# Patient Record
Sex: FEMALE | Race: WHITE | NOT HISPANIC OR LATINO | Employment: OTHER | URBAN - METROPOLITAN AREA
[De-identification: names, ages, dates, MRNs, and addresses within clinical notes are randomized per-mention and may not be internally consistent; named-entity substitution may affect disease eponyms.]

---

## 2017-10-16 ENCOUNTER — CONVERSION ENCOUNTER (OUTPATIENT)
Dept: MAMMOGRAPHY | Facility: HOSPITAL | Age: 57
End: 2017-10-16

## 2018-02-19 ENCOUNTER — OFFICE VISIT CONVERTED (OUTPATIENT)
Dept: NEUROLOGY | Facility: CLINIC | Age: 58
End: 2018-02-19
Attending: PSYCHIATRY & NEUROLOGY

## 2018-02-19 ENCOUNTER — CONVERSION ENCOUNTER (OUTPATIENT)
Dept: NEUROLOGY | Facility: CLINIC | Age: 58
End: 2018-02-19

## 2018-02-27 ENCOUNTER — OFFICE VISIT CONVERTED (OUTPATIENT)
Dept: CARDIOLOGY | Facility: CLINIC | Age: 58
End: 2018-02-27
Attending: SPECIALIST

## 2018-03-06 ENCOUNTER — OFFICE VISIT CONVERTED (OUTPATIENT)
Dept: NEUROLOGY | Facility: CLINIC | Age: 58
End: 2018-03-06
Attending: PSYCHIATRY & NEUROLOGY

## 2018-04-24 ENCOUNTER — OFFICE VISIT CONVERTED (OUTPATIENT)
Dept: NEUROLOGY | Facility: CLINIC | Age: 58
End: 2018-04-24
Attending: PSYCHIATRY & NEUROLOGY

## 2018-05-02 ENCOUNTER — OFFICE VISIT CONVERTED (OUTPATIENT)
Dept: FAMILY MEDICINE CLINIC | Facility: CLINIC | Age: 58
End: 2018-05-02
Attending: NURSE PRACTITIONER

## 2018-05-02 ENCOUNTER — CONVERSION ENCOUNTER (OUTPATIENT)
Dept: FAMILY MEDICINE CLINIC | Facility: CLINIC | Age: 58
End: 2018-05-02

## 2018-08-28 ENCOUNTER — OFFICE VISIT CONVERTED (OUTPATIENT)
Dept: CARDIOLOGY | Facility: CLINIC | Age: 58
End: 2018-08-28
Attending: SPECIALIST

## 2018-09-10 ENCOUNTER — OFFICE VISIT CONVERTED (OUTPATIENT)
Dept: FAMILY MEDICINE CLINIC | Facility: CLINIC | Age: 58
End: 2018-09-10
Attending: NURSE PRACTITIONER

## 2018-10-04 ENCOUNTER — PROCEDURE VISIT CONVERTED (OUTPATIENT)
Dept: PLASTIC SURGERY | Facility: CLINIC | Age: 58
End: 2018-10-04
Attending: PLASTIC SURGERY

## 2018-12-06 ENCOUNTER — PROCEDURE VISIT CONVERTED (OUTPATIENT)
Dept: PLASTIC SURGERY | Facility: CLINIC | Age: 58
End: 2018-12-06
Attending: PLASTIC SURGERY

## 2019-06-04 ENCOUNTER — OFFICE VISIT CONVERTED (OUTPATIENT)
Dept: FAMILY MEDICINE CLINIC | Facility: CLINIC | Age: 59
End: 2019-06-04
Attending: NURSE PRACTITIONER

## 2019-06-04 ENCOUNTER — CONVERSION ENCOUNTER (OUTPATIENT)
Dept: FAMILY MEDICINE CLINIC | Facility: CLINIC | Age: 59
End: 2019-06-04

## 2019-12-09 ENCOUNTER — CONVERSION ENCOUNTER (OUTPATIENT)
Dept: FAMILY MEDICINE CLINIC | Facility: CLINIC | Age: 59
End: 2019-12-09

## 2019-12-09 ENCOUNTER — OFFICE VISIT CONVERTED (OUTPATIENT)
Dept: FAMILY MEDICINE CLINIC | Facility: CLINIC | Age: 59
End: 2019-12-09
Attending: NURSE PRACTITIONER

## 2019-12-09 ENCOUNTER — HOSPITAL ENCOUNTER (OUTPATIENT)
Dept: LAB | Facility: HOSPITAL | Age: 59
Discharge: HOME | End: 2019-12-09
Attending: NURSE PRACTITIONER

## 2019-12-09 ENCOUNTER — HOSPITAL ENCOUNTER (OUTPATIENT)
Dept: GENERAL RADIOLOGY | Facility: HOSPITAL | Age: 59
Discharge: HOME | End: 2019-12-09
Attending: NURSE PRACTITIONER

## 2019-12-09 LAB
ANION GAP SERPL CALC-SCNC: 18 MMOL/L (ref 8–19)
BUN SERPL-MCNC: 16 MG/DL (ref 5–25)
BUN/CREAT SERPL: 29 {RATIO} (ref 6–20)
CALCIUM SERPL-MCNC: 9.9 MG/DL (ref 8.7–10.4)
CHLORIDE SERPL-SCNC: 105 MMOL/L (ref 99–111)
CONV CO2: 23 MMOL/L (ref 22–32)
CREAT UR-MCNC: 0.55 MG/DL (ref 0.5–0.9)
GFR SERPLBLD BASED ON 1.73 SQ M-ARVRAT: >60 ML/MIN/{1.73_M2}
GLUCOSE SERPL-MCNC: 93 MG/DL (ref 65–99)
OSMOLALITY SERPL CALC.SUM OF ELEC: 295 MOSM/KG (ref 273–304)
POTASSIUM SERPL-SCNC: 4.4 MMOL/L (ref 3.5–5.3)
SODIUM SERPL-SCNC: 142 MMOL/L (ref 135–147)

## 2019-12-18 ENCOUNTER — CONVERSION ENCOUNTER (OUTPATIENT)
Dept: CARDIOLOGY | Facility: CLINIC | Age: 59
End: 2019-12-18
Attending: INTERNAL MEDICINE

## 2020-01-04 ENCOUNTER — HOSPITAL ENCOUNTER (OUTPATIENT)
Dept: OTHER | Facility: HOSPITAL | Age: 60
Discharge: HOME | End: 2020-01-04
Attending: NURSE PRACTITIONER

## 2020-01-04 LAB
T4 FREE SERPL-MCNC: 1.4 NG/DL (ref 0.9–1.8)
TSH SERPL-ACNC: 0.03 M[IU]/L (ref 0.27–4.2)

## 2020-01-08 ENCOUNTER — CONVERSION ENCOUNTER (OUTPATIENT)
Dept: CARDIOLOGY | Facility: CLINIC | Age: 60
End: 2020-01-08
Attending: SPECIALIST

## 2020-01-17 ENCOUNTER — OFFICE VISIT CONVERTED (OUTPATIENT)
Dept: FAMILY MEDICINE CLINIC | Facility: CLINIC | Age: 60
End: 2020-01-17
Attending: NURSE PRACTITIONER

## 2020-01-17 ENCOUNTER — CONVERSION ENCOUNTER (OUTPATIENT)
Dept: FAMILY MEDICINE CLINIC | Facility: CLINIC | Age: 60
End: 2020-01-17

## 2020-01-17 ENCOUNTER — HOSPITAL ENCOUNTER (OUTPATIENT)
Dept: LAB | Facility: HOSPITAL | Age: 60
Discharge: HOME | End: 2020-01-17
Attending: NURSE PRACTITIONER

## 2020-01-18 LAB
CONV ANTI MICROSOMAL AB: 40 IU/ML (ref 0–34)
T3FREE SERPL-MCNC: 4.7 PG/ML (ref 2–4.4)

## 2020-02-04 ENCOUNTER — HOSPITAL ENCOUNTER (OUTPATIENT)
Dept: ULTRASOUND IMAGING | Facility: HOSPITAL | Age: 60
Discharge: HOME | End: 2020-02-04
Attending: INTERNAL MEDICINE

## 2020-03-02 ENCOUNTER — HOSPITAL ENCOUNTER (OUTPATIENT)
Dept: LAB | Facility: HOSPITAL | Age: 60
Discharge: HOME | End: 2020-03-02
Attending: INTERNAL MEDICINE

## 2020-03-02 LAB
ALBUMIN SERPL-MCNC: 4.2 G/DL (ref 3.5–5)
ALBUMIN/GLOB SERPL: 1.6 {RATIO} (ref 1.4–2.6)
ALP SERPL-CCNC: 101 U/L (ref 43–160)
ALT SERPL-CCNC: 32 U/L (ref 10–40)
ANION GAP SERPL CALC-SCNC: 18 MMOL/L (ref 8–19)
AST SERPL-CCNC: 24 U/L (ref 15–50)
BILIRUB SERPL-MCNC: 0.3 MG/DL (ref 0.2–1.3)
BUN SERPL-MCNC: 13 MG/DL (ref 5–25)
BUN/CREAT SERPL: 19 {RATIO} (ref 6–20)
CALCIUM SERPL-MCNC: 9.5 MG/DL (ref 8.7–10.4)
CHLORIDE SERPL-SCNC: 101 MMOL/L (ref 99–111)
CONV CO2: 25 MMOL/L (ref 22–32)
CONV TOTAL PROTEIN: 6.9 G/DL (ref 6.3–8.2)
CREAT UR-MCNC: 0.67 MG/DL (ref 0.5–0.9)
GFR SERPLBLD BASED ON 1.73 SQ M-ARVRAT: >60 ML/MIN/{1.73_M2}
GLOBULIN UR ELPH-MCNC: 2.7 G/DL (ref 2–3.5)
GLUCOSE SERPL-MCNC: 84 MG/DL (ref 65–99)
OSMOLALITY SERPL CALC.SUM OF ELEC: 289 MOSM/KG (ref 273–304)
POTASSIUM SERPL-SCNC: 4.1 MMOL/L (ref 3.5–5.3)
SODIUM SERPL-SCNC: 140 MMOL/L (ref 135–147)
T4 FREE SERPL-MCNC: 1.6 NG/DL (ref 0.9–1.8)
TSH SERPL-ACNC: 0.01 M[IU]/L (ref 0.27–4.2)

## 2020-03-03 LAB — T3FREE SERPL-MCNC: 4.3 PG/ML (ref 2–4.4)

## 2020-03-17 ENCOUNTER — HOSPITAL ENCOUNTER (OUTPATIENT)
Dept: FAMILY MEDICINE CLINIC | Facility: CLINIC | Age: 60
Discharge: HOME | End: 2020-03-17
Attending: NURSE PRACTITIONER

## 2020-03-20 LAB
AMOXICILLIN+CLAV SUSC ISLT: <=2
AMPICILLIN SUSC ISLT: >=32
AMPICILLIN+SULBAC SUSC ISLT: 8
BACTERIA UR CULT: ABNORMAL
CEFAZOLIN SUSC ISLT: <=4
CEFEPIME SUSC ISLT: <=1
CEFTAZIDIME SUSC ISLT: <=1
CEFTRIAXONE SUSC ISLT: <=1
CEFUROXIME ORAL SUSC ISLT: 2
CEFUROXIME PARENTER SUSC ISLT: 2
CIPROFLOXACIN SUSC ISLT: <=0.25
ERTAPENEM SUSC ISLT: <=0.5
GENTAMICIN SUSC ISLT: <=1
LEVOFLOXACIN SUSC ISLT: <=0.12
NITROFURANTOIN SUSC ISLT: 32
TETRACYCLINE SUSC ISLT: <=1
TMP SMX SUSC ISLT: <=20
TOBRAMYCIN SUSC ISLT: <=1

## 2020-04-24 ENCOUNTER — HOSPITAL ENCOUNTER (OUTPATIENT)
Dept: LAB | Facility: HOSPITAL | Age: 60
Discharge: HOME | End: 2020-04-24
Attending: INTERNAL MEDICINE

## 2020-04-24 LAB
ALBUMIN SERPL-MCNC: 4.3 G/DL (ref 3.5–5)
ALBUMIN/GLOB SERPL: 1.7 {RATIO} (ref 1.4–2.6)
ALP SERPL-CCNC: 117 U/L (ref 43–160)
ALT SERPL-CCNC: 42 U/L (ref 10–40)
ANION GAP SERPL CALC-SCNC: 15 MMOL/L (ref 8–19)
AST SERPL-CCNC: 24 U/L (ref 15–50)
BASOPHILS # BLD AUTO: 0.05 10*3/UL (ref 0–0.2)
BASOPHILS NFR BLD AUTO: 1.2 % (ref 0–3)
BILIRUB SERPL-MCNC: 0.53 MG/DL (ref 0.2–1.3)
BUN SERPL-MCNC: 12 MG/DL (ref 5–25)
BUN/CREAT SERPL: 17 {RATIO} (ref 6–20)
CALCIUM SERPL-MCNC: 9.5 MG/DL (ref 8.7–10.4)
CHLORIDE SERPL-SCNC: 101 MMOL/L (ref 99–111)
CONV ABS IMM GRAN: 0.01 10*3/UL (ref 0–0.2)
CONV CO2: 24 MMOL/L (ref 22–32)
CONV IMMATURE GRAN: 0.2 % (ref 0–1.8)
CONV TOTAL PROTEIN: 6.9 G/DL (ref 6.3–8.2)
CREAT UR-MCNC: 0.72 MG/DL (ref 0.5–0.9)
DEPRECATED RDW RBC AUTO: 45 FL (ref 36.4–46.3)
EOSINOPHIL # BLD AUTO: 0 % (ref 0–7)
EOSINOPHIL # BLD AUTO: 0 10*3/UL (ref 0–0.7)
ERYTHROCYTE [DISTWIDTH] IN BLOOD BY AUTOMATED COUNT: 13.8 % (ref 11.7–14.4)
GFR SERPLBLD BASED ON 1.73 SQ M-ARVRAT: >60 ML/MIN/{1.73_M2}
GLOBULIN UR ELPH-MCNC: 2.6 G/DL (ref 2–3.5)
GLUCOSE SERPL-MCNC: 94 MG/DL (ref 65–99)
HCT VFR BLD AUTO: 41.7 % (ref 37–47)
HGB BLD-MCNC: 13.5 G/DL (ref 12–16)
LYMPHOCYTES # BLD AUTO: 1.68 10*3/UL (ref 1–5)
LYMPHOCYTES NFR BLD AUTO: 41.7 % (ref 20–45)
MCH RBC QN AUTO: 28.9 PG (ref 27–31)
MCHC RBC AUTO-ENTMCNC: 32.4 G/DL (ref 33–37)
MCV RBC AUTO: 89.3 FL (ref 81–99)
MONOCYTES # BLD AUTO: 0.37 10*3/UL (ref 0.2–1.2)
MONOCYTES NFR BLD AUTO: 9.2 % (ref 3–10)
NEUTROPHILS # BLD AUTO: 1.92 10*3/UL (ref 2–8)
NEUTROPHILS NFR BLD AUTO: 47.7 % (ref 30–85)
NRBC CBCN: 0 % (ref 0–0.7)
OSMOLALITY SERPL CALC.SUM OF ELEC: 282 MOSM/KG (ref 273–304)
PLATELET # BLD AUTO: 236 10*3/UL (ref 130–400)
PMV BLD AUTO: 11.7 FL (ref 9.4–12.3)
POTASSIUM SERPL-SCNC: 4.3 MMOL/L (ref 3.5–5.3)
RBC # BLD AUTO: 4.67 10*6/UL (ref 4.2–5.4)
SODIUM SERPL-SCNC: 136 MMOL/L (ref 135–147)
T4 FREE SERPL-MCNC: 1 NG/DL (ref 0.9–1.8)
TSH SERPL-ACNC: 5.55 M[IU]/L (ref 0.27–4.2)
WBC # BLD AUTO: 4.03 10*3/UL (ref 4.8–10.8)

## 2020-04-25 LAB — T3FREE SERPL-MCNC: 2.8 PG/ML (ref 2–4.4)

## 2020-05-22 ENCOUNTER — HOSPITAL ENCOUNTER (OUTPATIENT)
Dept: LAB | Facility: HOSPITAL | Age: 60
Discharge: HOME | End: 2020-05-22
Attending: INTERNAL MEDICINE

## 2020-05-22 LAB
T4 FREE SERPL-MCNC: 1.2 NG/DL (ref 0.9–1.8)
TSH SERPL-ACNC: 5.18 M[IU]/L (ref 0.27–4.2)

## 2020-05-24 LAB — T3FREE SERPL-MCNC: 2.8 PG/ML (ref 2–4.4)

## 2020-07-20 ENCOUNTER — HOSPITAL ENCOUNTER (OUTPATIENT)
Dept: LAB | Facility: HOSPITAL | Age: 60
Discharge: HOME | End: 2020-07-20
Attending: INTERNAL MEDICINE

## 2020-07-20 LAB
ALBUMIN SERPL-MCNC: 4.6 G/DL (ref 3.5–5)
ALBUMIN/GLOB SERPL: 1.7 {RATIO} (ref 1.4–2.6)
ALP SERPL-CCNC: 93 U/L (ref 43–160)
ALT SERPL-CCNC: 25 U/L (ref 10–40)
ANION GAP SERPL CALC-SCNC: 25 MMOL/L (ref 8–19)
AST SERPL-CCNC: 31 U/L (ref 15–50)
BASOPHILS # BLD AUTO: 0.06 10*3/UL (ref 0–0.2)
BASOPHILS NFR BLD AUTO: 0.7 % (ref 0–3)
BILIRUB SERPL-MCNC: 0.6 MG/DL (ref 0.2–1.3)
BUN SERPL-MCNC: 15 MG/DL (ref 5–25)
BUN/CREAT SERPL: 19 {RATIO} (ref 6–20)
CALCIUM SERPL-MCNC: 9.8 MG/DL (ref 8.7–10.4)
CHLORIDE SERPL-SCNC: 102 MMOL/L (ref 99–111)
CONV ABS IMM GRAN: 0.02 10*3/UL (ref 0–0.2)
CONV CO2: 20 MMOL/L (ref 22–32)
CONV IMMATURE GRAN: 0.2 % (ref 0–1.8)
CONV TOTAL PROTEIN: 7.3 G/DL (ref 6.3–8.2)
CREAT UR-MCNC: 0.8 MG/DL (ref 0.5–0.9)
DEPRECATED RDW RBC AUTO: 45.1 FL (ref 36.4–46.3)
EOSINOPHIL # BLD AUTO: 0.07 10*3/UL (ref 0–0.7)
EOSINOPHIL # BLD AUTO: 0.8 % (ref 0–7)
ERYTHROCYTE [DISTWIDTH] IN BLOOD BY AUTOMATED COUNT: 13.3 % (ref 11.7–14.4)
GFR SERPLBLD BASED ON 1.73 SQ M-ARVRAT: >60 ML/MIN/{1.73_M2}
GLOBULIN UR ELPH-MCNC: 2.7 G/DL (ref 2–3.5)
GLUCOSE SERPL-MCNC: 100 MG/DL (ref 65–99)
HCT VFR BLD AUTO: 41.7 % (ref 37–47)
HGB BLD-MCNC: 13.6 G/DL (ref 12–16)
LYMPHOCYTES # BLD AUTO: 1.88 10*3/UL (ref 1–5)
LYMPHOCYTES NFR BLD AUTO: 22.6 % (ref 20–45)
MCH RBC QN AUTO: 30.2 PG (ref 27–31)
MCHC RBC AUTO-ENTMCNC: 32.6 G/DL (ref 33–37)
MCV RBC AUTO: 92.7 FL (ref 81–99)
MONOCYTES # BLD AUTO: 0.65 10*3/UL (ref 0.2–1.2)
MONOCYTES NFR BLD AUTO: 7.8 % (ref 3–10)
NEUTROPHILS # BLD AUTO: 5.63 10*3/UL (ref 2–8)
NEUTROPHILS NFR BLD AUTO: 67.9 % (ref 30–85)
NRBC CBCN: 0 % (ref 0–0.7)
OSMOLALITY SERPL CALC.SUM OF ELEC: 297 MOSM/KG (ref 273–304)
PLATELET # BLD AUTO: 242 10*3/UL (ref 130–400)
PMV BLD AUTO: 12.3 FL (ref 9.4–12.3)
POTASSIUM SERPL-SCNC: 4 MMOL/L (ref 3.5–5.3)
RBC # BLD AUTO: 4.5 10*6/UL (ref 4.2–5.4)
SODIUM SERPL-SCNC: 143 MMOL/L (ref 135–147)
WBC # BLD AUTO: 8.31 10*3/UL (ref 4.8–10.8)

## 2020-07-21 LAB
T3FREE SERPL-MCNC: 2.8 PG/ML (ref 2–4.4)
T4 FREE SERPL-MCNC: 1.2 NG/DL (ref 0.9–1.8)
TSH SERPL-ACNC: 4.98 M[IU]/L (ref 0.27–4.2)

## 2020-08-27 ENCOUNTER — HOSPITAL ENCOUNTER (OUTPATIENT)
Dept: LAB | Facility: HOSPITAL | Age: 60
Discharge: HOME | End: 2020-08-27
Attending: INTERNAL MEDICINE

## 2020-08-27 LAB
T4 FREE SERPL-MCNC: 1.4 NG/DL (ref 0.9–1.8)
TSH SERPL-ACNC: 1.29 M[IU]/L (ref 0.27–4.2)

## 2020-08-28 LAB — T3FREE SERPL-MCNC: 3.4 PG/ML (ref 2–4.4)

## 2020-11-05 ENCOUNTER — HOSPITAL ENCOUNTER (OUTPATIENT)
Dept: LAB | Facility: HOSPITAL | Age: 60
Discharge: HOME | End: 2020-11-05
Attending: INTERNAL MEDICINE

## 2020-11-05 LAB
T4 FREE SERPL-MCNC: 1.3 NG/DL (ref 0.9–1.8)
TSH SERPL-ACNC: 0.67 M[IU]/L (ref 0.27–4.2)

## 2020-11-06 LAB — T3FREE SERPL-MCNC: 3 PG/ML (ref 2–4.4)

## 2020-11-23 ENCOUNTER — HOSPITAL ENCOUNTER (OUTPATIENT)
Dept: GENERAL RADIOLOGY | Facility: HOSPITAL | Age: 60
Discharge: HOME | End: 2020-11-23
Attending: NURSE PRACTITIONER

## 2020-11-30 ENCOUNTER — CONVERSION ENCOUNTER (OUTPATIENT)
Dept: FAMILY MEDICINE CLINIC | Facility: CLINIC | Age: 60
End: 2020-11-30

## 2020-11-30 ENCOUNTER — OFFICE VISIT CONVERTED (OUTPATIENT)
Dept: FAMILY MEDICINE CLINIC | Facility: CLINIC | Age: 60
End: 2020-11-30
Attending: NURSE PRACTITIONER

## 2021-01-29 ENCOUNTER — HOSPITAL ENCOUNTER (OUTPATIENT)
Dept: LAB | Facility: HOSPITAL | Age: 61
Discharge: HOME | End: 2021-01-29
Attending: INTERNAL MEDICINE

## 2021-01-29 LAB
T4 FREE SERPL-MCNC: 1.3 NG/DL (ref 0.9–1.8)
TSH SERPL-ACNC: 0.64 M[IU]/L (ref 0.27–4.2)

## 2021-01-30 LAB — T3FREE SERPL-MCNC: 3.4 PG/ML (ref 2–4.4)

## 2021-02-09 ENCOUNTER — HOSPITAL ENCOUNTER (OUTPATIENT)
Dept: FAMILY MEDICINE CLINIC | Facility: CLINIC | Age: 61
Discharge: HOME | End: 2021-02-09
Attending: NURSE PRACTITIONER

## 2021-02-09 ENCOUNTER — OFFICE VISIT CONVERTED (OUTPATIENT)
Dept: FAMILY MEDICINE CLINIC | Facility: CLINIC | Age: 61
End: 2021-02-09
Attending: NURSE PRACTITIONER

## 2021-02-09 ENCOUNTER — CONVERSION ENCOUNTER (OUTPATIENT)
Dept: FAMILY MEDICINE CLINIC | Facility: CLINIC | Age: 61
End: 2021-02-09

## 2021-02-09 LAB
BILIRUB UR QL STRIP: NEGATIVE
COLOR UR: YELLOW
CONV CLARITY OF URINE: CLEAR
CONV PROTEIN IN URINE BY AUTOMATED TEST STRIP: NEGATIVE
CONV UROBILINOGEN IN URINE BY AUTOMATED TEST STRIP: NORMAL
GLUCOSE UR QL: NEGATIVE
HGB UR QL STRIP: NORMAL
KETONES UR QL STRIP: NEGATIVE
LEUKOCYTE ESTERASE UR QL STRIP: NORMAL
NITRITE UR QL STRIP: NEGATIVE
PH UR STRIP.AUTO: 7.5 [PH]
SP GR UR: 1.02

## 2021-02-11 LAB
AMPICILLIN SUSC ISLT: >=32
AMPICILLIN+SULBAC SUSC ISLT: >=32
BACTERIA UR CULT: ABNORMAL
CEFAZOLIN SUSC ISLT: 16
CEFEPIME SUSC ISLT: <=0.12
CEFTAZIDIME SUSC ISLT: <=1
CEFTRIAXONE SUSC ISLT: <=0.25
CIPROFLOXACIN SUSC ISLT: <=0.25
ERTAPENEM SUSC ISLT: <=0.12
GENTAMICIN SUSC ISLT: <=1
LEVOFLOXACIN SUSC ISLT: <=0.12
NITROFURANTOIN SUSC ISLT: <=16
PIP+TAZO SUSC ISLT: <=4
TMP SMX SUSC ISLT: <=20
TOBRAMYCIN SUSC ISLT: <=1

## 2021-03-04 ENCOUNTER — HOSPITAL ENCOUNTER (OUTPATIENT)
Dept: LAB | Facility: HOSPITAL | Age: 61
Discharge: HOME | End: 2021-03-04
Attending: INTERNAL MEDICINE

## 2021-03-04 LAB
T4 FREE SERPL-MCNC: 1.3 NG/DL (ref 0.9–1.8)
TSH SERPL-ACNC: 0.76 M[IU]/L (ref 0.27–4.2)

## 2021-03-05 LAB — T3FREE SERPL-MCNC: 3.4 PG/ML (ref 2–4.4)

## 2021-03-08 ENCOUNTER — HOSPITAL ENCOUNTER (OUTPATIENT)
Dept: LAB | Facility: HOSPITAL | Age: 61
Discharge: HOME | End: 2021-03-08
Attending: INTERNAL MEDICINE

## 2021-03-08 LAB
ALBUMIN SERPL-MCNC: 4.7 G/DL (ref 3.5–5)
ALBUMIN/GLOB SERPL: 2 {RATIO} (ref 1.4–2.6)
ALP SERPL-CCNC: 86 U/L (ref 43–160)
ALT SERPL-CCNC: 24 U/L (ref 10–40)
ANION GAP SERPL CALC-SCNC: 16 MMOL/L (ref 8–19)
AST SERPL-CCNC: 23 U/L (ref 15–50)
BILIRUB SERPL-MCNC: 0.32 MG/DL (ref 0.2–1.3)
BUN SERPL-MCNC: 13 MG/DL (ref 5–25)
BUN/CREAT SERPL: 16 {RATIO} (ref 6–20)
CALCIUM SERPL-MCNC: 9.7 MG/DL (ref 8.7–10.4)
CHLORIDE SERPL-SCNC: 103 MMOL/L (ref 99–111)
CONV CO2: 26 MMOL/L (ref 22–32)
CONV TOTAL PROTEIN: 7.1 G/DL (ref 6.3–8.2)
CREAT UR-MCNC: 0.81 MG/DL (ref 0.5–0.9)
FSH SERPL-ACNC: 109.6 M[IU]/ML
GFR SERPLBLD BASED ON 1.73 SQ M-ARVRAT: >60 ML/MIN/{1.73_M2}
GLOBULIN UR ELPH-MCNC: 2.4 G/DL (ref 2–3.5)
GLUCOSE SERPL-MCNC: 113 MG/DL (ref 65–99)
LH SERPL-ACNC: 49.4 M[IU]/ML
OSMOLALITY SERPL CALC.SUM OF ELEC: 293 MOSM/KG (ref 273–304)
POTASSIUM SERPL-SCNC: 3.5 MMOL/L (ref 3.5–5.3)
SODIUM SERPL-SCNC: 141 MMOL/L (ref 135–147)

## 2021-03-09 LAB — PROGEST SERPL-MCNC: <0.1 NG/ML

## 2021-03-14 LAB — CONV ESTROGENS, TOTAL, SERUM: 68 PG/ML

## 2021-03-15 LAB
CONV TESTOSTERONE, FREE: 5.1 PG/ML
TESTOST FREE MFR SERPL: 0.8 %
TESTOST SERPL-MCNC: 64 NG/DL

## 2021-04-13 ENCOUNTER — OFFICE VISIT CONVERTED (OUTPATIENT)
Dept: FAMILY MEDICINE CLINIC | Facility: CLINIC | Age: 61
End: 2021-04-13
Attending: NURSE PRACTITIONER

## 2021-05-05 ENCOUNTER — HOSPITAL ENCOUNTER (OUTPATIENT)
Dept: LAB | Facility: HOSPITAL | Age: 61
Discharge: HOME | End: 2021-05-05
Attending: INTERNAL MEDICINE

## 2021-05-05 LAB
T4 FREE SERPL-MCNC: 1.4 NG/DL (ref 0.9–1.8)
TSH SERPL-ACNC: 0.52 M[IU]/L (ref 0.27–4.2)

## 2021-05-06 LAB — T3FREE SERPL-MCNC: 3.5 PG/ML (ref 2–4.4)

## 2021-05-12 ENCOUNTER — OFFICE VISIT CONVERTED (OUTPATIENT)
Dept: FAMILY MEDICINE CLINIC | Facility: CLINIC | Age: 61
End: 2021-05-12
Attending: NURSE PRACTITIONER

## 2021-05-12 ENCOUNTER — CONVERSION ENCOUNTER (OUTPATIENT)
Dept: FAMILY MEDICINE CLINIC | Facility: CLINIC | Age: 61
End: 2021-05-12

## 2021-05-13 NOTE — PROGRESS NOTES
Progress Note      Patient Name: Janna Rich   Patient ID: [id number]   Sex: Female   YOB: 1960    Primary Care Provider: Shan ODONNELL   Referring Provider: Jered Siddiqui MD    Visit Date: November 30, 2020    Provider: BELLE Worthy   Location: [facility]   Location Address: [address] [place] [address], [address] [place] [address]  [id number]   Location Phone: [phone]          Chief Complaint  · Follow up left foot pain  · Neck pain      History Of Present Illness  Janna Rich is a 60 year old /White female who presents for evaluation and treatment of:      Patient presents to the office today with complaints of left foot pain.  She did have an x-ray completed last week which revealed a calcaneal spur.  Patient does state that she knew about the calcaneal spur but states is not where her pain is at.  Patient states the pain is at the base of her second third and fourth toe.  She does state that it feels as if something is in her shoe while she is working.  I did discuss the possibility of a Stafford's neuroma.  She already has a referral for podiatry.  I did discuss walking boot to help relieve the pressure off of her foot.    Patient also complains of neck pain.  She does have a history of cervalgia.  She states that this feels muscular in nature.  She denies any strenuous activity or trauma.  He denies any numbness or tingling of her hands       Past Medical History  Disease Name Date Onset Notes   AC joint arthropathy 08/16/2016 --    Anxiety --  --    Arthritis --  --    Carpal tunnel syndrome of left wrist 05/14/2016 --    Cervical disc herniation 12/29/2016 --    Cervical radiculopathy 12/04/2017 --    Cervicalgia 12/04/2017 --    Cold sore --  --    Decreased sensation of lower extremity 12/04/2017 bilateral   Depression --  --    Facial aging --  --    Finger numbness 05/14/2016 --    Gastric reflux --  --    Hand numbness 02/19/2018 The patient  notes a two year history of hand numbness that began in the left hand. She was diagnosed with CTS and underwent carpal tunnel release without benefit. Approximately 18 months ago, she developed numbness in the right hand. On exam, she has numbness in the 1st and 2nd digits of both hands along with numbness over the lateral aspect of the bilateral forearms. I would be concerned about a C6 radiculopathy. She questions atrophy of the bilateral APB but her strength seems good. I will pursue a NCS. I did personally review her MRI of the C spine which revealed canal and foraminal stenosis at C5/C6. She is noted to have mild to moderate canal stenosis at this level.    Hand weakness 12/21/2017 --    Heart Murmur --  --    High blood pressure --  --    HTN (hypertension) --  --    Lumbago 12/04/2017 --    Migraine --  --    Muscle atrophy of upper extremity 12/21/2017 --    MVP (mitral valve prolapse) --  --    Night sweats --  --    Paresthesia 12/21/2017 --    Seasonal allergies --  --    Subacromial impingement, left 08/16/2016 --    Tremor 02/19/2018 The patient is noted to have a mild tremor that could be medication related.    Trigger finger of left thumb 12/29/2016 --          Past Surgical History  Procedure Name Date Notes   Abdominalplasty 1994 tummy tuck   Breast augmentation --  --    Carpal tunnel release --  --    Cesarian Section 1983 1986 --    Cholecystectomies, laparoscopic --  --    Colonoscopy 2011, 2013 --    EGD 2013 --    Hysterectomy 1990 --    Trigger Finger 12/29/16 Right Trigger Thumb Release - Dr Kapoor   Tubal ligation 1987 --          Medication List  Name Date Started Instructions   hydrochlorothiazide 25 mg oral tablet 08/21/2020 take 1 tablet (25 mg) by oral route once daily   losartan 50 mg oral tablet 06/12/2020 take 1 tablet (50 mg) by oral route 2 times per day for 90 days   Lunesta 3 mg oral tablet 07/20/2020 take 1 tablet (3 mg) by oral route once daily at bedtime for 30 days   Paxil  20 mg oral tablet  take 1 tablet (20 mg) by oral route once daily   Tapazole 5 mg oral tablet  take 1 tablet (5 mg) by oral route on Monday, Wednesday and Friday   Toprol XL 25 mg oral tablet extended release 24 hr 01/03/2020 take 0.5 tablet by oral route daily for 30 days   trazodone 50 mg oral tablet 01/17/2020 take 1 tablet (50 mg) by oral route once daily at bedtime         Allergy List  Allergen Name Date Reaction Notes   Cipro --  --  --    Codeine Sulfate --  --  --    morphine --  --  --          Family Medical History  Disease Name Relative/Age Notes   - No Family History of Colorectal Cancer  --          Social History  Finding Status Start/Stop Quantity Notes   Alcohol Light --/-- --  04/24/2018 - 1-2 beers month   Denies substance abuse --  --/-- --  --    Tobacco Never --/-- --  --          Immunizations  NameDate Admin Mfg Trade Name Lot Number Route Inj VIS Given VIS Publication   [id number]/01/2019 SKB Fluarix, quadrivalent, preservative free [id number] NE NE 01/17/2020    Comments:          Review of Systems  · Constitutional  o Denies  o : fatigue, night sweats  · Eyes  o Denies  o : double vision, blurred vision  · HENT  o Denies  o : vertigo, recent head injury  · Breasts  o Denies  o : abnormal changes in breast size, additional breast symptoms except as noted in the HPI  · Cardiovascular  o Denies  o : chest pain, irregular heart beats  · Respiratory  o Denies  o : shortness of breath, productive cough  · Gastrointestinal  o Denies  o : nausea, vomiting  · Genitourinary  o Denies  o : dysuria, urinary retention  · Integument  o Denies  o : hair growth change, new skin lesions  · Neurologic  o Denies  o : altered mental status, seizures  · Musculoskeletal  o Admits  o : neck pain, foot pain  o Denies  o : joint swelling, limitation of motion  · Endocrine  o Denies  o : cold intolerance, heat intolerance  · Heme-Lymph  o Denies  o : petechiae, lymph node enlargement or  tenderness  · Allergic-Immunologic  o Denies  o : frequent illnesses      Vitals  Date Time BP Position Site L\R Cuff Size HR RR TEMP (F) WT  HT  BMI kg/m2 BSA m2 O2 Sat FR L/min FiO2 HC       11/30/2020 08:09 /80 Sitting    70 - R  97 133lbs 0oz 5'   25.97 1.6 98 %            Physical Examination  · Constitutional  o Appearance  o : well-nourished, in no acute distress  · Neck  o Inspection/Palpation  o : normal appearance, no masses or tenderness, trachea midline  o Range of Motion  o : cervical range of motion within normal limits  o Thyroid  o : gland size normal, nontender, no nodules or masses present on palpation  · Respiratory  o Respiratory Effort  o : breathing unlabored  o Inspection of Chest  o : normal appearance  o Auscultation of Lungs  o : normal breath sounds throughout inspiration and expiration  · Cardiovascular  o Heart  o :   § Auscultation of Heart  § : regular rate and rhythm, no murmurs, gallops or rubs  o Peripheral Vascular System  o :   § Carotid Arteries  § : normal pulses bilaterally, no bruits present  § Pedal Pulses  § : pulses 2 bilaterally  § Extremities  § : no clubbing or edema  · Skin and Subcutaneous Tissue  o General Inspection  o : no rashes or lesions present, no areas of discoloration  o Body Hair  o : hair normal for age, general body hair distribution normal for age  o Digits and Nails  o : no clubbing, cyanosis, deformities or edema present, normal appearing nails  · Neurologic  o Mental Status Examination  o :   § Orientation  § : grossly oriented to person, place and time  o Gait and Station  o : normal gait, able to stand without difficulty  · Psychiatric  o Judgement and Insight  o : judgment and insight intact  o Mood and Affect  o : mood normal, affect appropriate  o Presence of Abnormal Thoughts  o : no hallucinations, no delusions present, no psychotic thoughts  · Left Ankle/Foot  o Inspection  o : no redness, swelling, or atrophy; normal alignment and  arch  o Palpation  o : no effusion, crepitus or clicking tenderness noted at the base of the second third and fourth metatarsal  o Neurovascular  o : neurovascularly itact  o Range of Motion  o : normal range of motion          Assessment  · Cervical pain (neck)     723.1/M54.2  · Foot pain, left     729.5/M79.672      Plan  · Orders  o ACO-39: Current medications updated and reviewed (, 1159F) - - 11/30/2020  · Medications  o diclofenac sodium 75 mg oral tablet,delayed release (DR/EC)   SIG: take 1 tablet (75 mg) by oral route 2 times per day   DISP: (60) Tablet with 2 refills  Prescribed on 11/30/2020     o cyclobenzaprine 10 mg oral tablet   SIG: take 1 tablet (10 mg) by oral route 2 times per day   DISP: (60) Tablet with 0 refills  Prescribed on 11/30/2020     o Medications have been Reconciled  o Transition of Care or Provider Policy  · Instructions  o Patient was educated/instructed on their diagnosis, treatment and medications prior to discharge from the clinic today.  o Minutes spent with patient including greater than 50% in Education/Counseling/Care Coordination.  o Time spent with the patient was minutes, more than 50% face to face.  o Electronically Identified Patient Education Materials Provided Electronically  · Disposition  o Call or Return if symptoms worsen or persist.            Electronically Signed by: BELLE Worthy -Author on November 30, 2020 08:35:28 AM

## 2021-05-14 VITALS
WEIGHT: 138 LBS | BODY MASS INDEX: 27.09 KG/M2 | TEMPERATURE: 98.3 F | SYSTOLIC BLOOD PRESSURE: 112 MMHG | HEIGHT: 60 IN | OXYGEN SATURATION: 97 % | HEART RATE: 59 BPM | DIASTOLIC BLOOD PRESSURE: 62 MMHG

## 2021-05-14 VITALS
HEART RATE: 70 BPM | BODY MASS INDEX: 26.11 KG/M2 | DIASTOLIC BLOOD PRESSURE: 80 MMHG | HEIGHT: 60 IN | TEMPERATURE: 97 F | WEIGHT: 133 LBS | SYSTOLIC BLOOD PRESSURE: 122 MMHG | OXYGEN SATURATION: 98 %

## 2021-05-14 VITALS
DIASTOLIC BLOOD PRESSURE: 68 MMHG | BODY MASS INDEX: 28.47 KG/M2 | TEMPERATURE: 98.5 F | WEIGHT: 145 LBS | HEART RATE: 60 BPM | HEIGHT: 60 IN | OXYGEN SATURATION: 98 % | SYSTOLIC BLOOD PRESSURE: 138 MMHG

## 2021-05-14 NOTE — PROGRESS NOTES
Progress Note      Patient Name: Janna Rich   Patient ID: [id number]   Sex: Female   YOB: 1960    Primary Care Provider: Shan ODONNELL   Referring Provider: Jered Siddiqui MD    Visit Date: April 13, 2021    Provider: BELLE Worthy   Location: [facility]   Location Address: [address] [place] [address], [address] [place] [address]  [id number]   Location Phone: [phone]          Chief Complaint  · Depression and anger  · Wants to talk about weight       History Of Present Illness  Janna Rich is a 61 year old /White female who presents for evaluation and treatment of:      Patient presents to the office today to discuss her depression.  She states that she discontinued her Paxil as she did not feel it was working as well plus she was gaining weight.  Patient states that she was on Wellbutrin in the past that worked very well for her.  She does state that she discontinued this because the neurologist thought it was contributing to her tremors.  Patient states that she would like to try to go back on it to see if her tremors return due to it being effective for her.  Patient also like to discuss weight management.  She states that she has been eating healthier and trying to be more active to lose weight but is struggling with this.  I did discuss pharmacological agents that we could utilize to help boost the weight loss and to help encourage her to continue healthier lifestyle modifications       Past Medical History  Disease Name Date Onset Notes   AC joint arthropathy 08/16/2016 --    Anxiety --  --    Arthritis --  --    Carpal tunnel syndrome of left wrist 05/14/2016 --    Cervical disc herniation 12/29/2016 --    Cervical radiculopathy 12/04/2017 --    Cervicalgia 12/04/2017 --    Cold sore --  --    Decreased sensation of lower extremity 12/04/2017 bilateral   Depression --  --    Facial aging --  --    Finger numbness 05/14/2016 --    Gastric reflux --   --    Hand numbness 02/19/2018 The patient notes a two year history of hand numbness that began in the left hand. She was diagnosed with CTS and underwent carpal tunnel release without benefit. Approximately 18 months ago, she developed numbness in the right hand. On exam, she has numbness in the 1st and 2nd digits of both hands along with numbness over the lateral aspect of the bilateral forearms. I would be concerned about a C6 radiculopathy. She questions atrophy of the bilateral APB but her strength seems good. I will pursue a NCS. I did personally review her MRI of the C spine which revealed canal and foraminal stenosis at C5/C6. She is noted to have mild to moderate canal stenosis at this level.    Hand weakness 12/21/2017 --    Heart Murmur --  --    High blood pressure --  --    HTN (hypertension) --  --    Lumbago 12/04/2017 --    Migraine --  --    Muscle atrophy of upper extremity 12/21/2017 --    MVP (mitral valve prolapse) --  --    Night sweats --  --    Paresthesia 12/21/2017 --    Seasonal allergies --  --    Subacromial impingement, left 08/16/2016 --    Tremor 02/19/2018 The patient is noted to have a mild tremor that could be medication related.    Trigger finger of left thumb 12/29/2016 --          Past Surgical History  Procedure Name Date Notes   Abdominalplasty 1994 tummy tuck   Breast augmentation --  --    Carpal tunnel release --  --    Cesarian Section 1983 1986 --    Cholecystectomies, laparoscopic --  --    Colonoscopy 2011, 2013 --    EGD 2013 --    Hysterectomy 1990 --    Trigger Finger 12/29/16 Right Trigger Thumb Release - Dr Kapoor   Tubal ligation 1987 --          Medication List  Name Date Started Instructions   LOSARTAN POTASSIUM 50 MG TAB 02/10/2021 2 tablets once a day   Toprol XL 25 mg oral tablet extended release 24 hr 01/03/2020 take 0.5 tablet by oral route daily for 30 days         Allergy List  Allergen Name Date Reaction Notes   Cipro --  --  --    Codeine Sulfate --   --  --    morphine --  --  --        Allergies Reconciled  Family Medical History  Disease Name Relative/Age Notes   - No Family History of Colorectal Cancer  --          Social History  Finding Status Start/Stop Quantity Notes   Alcohol Light --/-- --  04/24/2018 - 1-2 beers month   Denies substance abuse --  --/-- --  --    Tobacco Never --/-- --  --          Immunizations  NameDate Admin Mfg Trade Name Lot Number Route Inj VIS Given VIS Publication   [id number]/10/2020 SKB Fluarix, quadrivalent, preservative free [id number] NE NE 02/09/2021    Comments:          Review of Systems  · Constitutional  o Denies  o : fever, fatigue, weight loss, weight gain  · Cardiovascular  o Denies  o : lower extremity edema, claudication, chest pressure, palpitations  · Respiratory  o Denies  o : shortness of breath, wheezing, cough, hemoptysis, dyspnea on exertion  · Gastrointestinal  o Denies  o : nausea, vomiting, diarrhea, constipation, abdominal pain  · Psychiatric  o Admits  o : depression, excessive anger      Vitals  Date Time BP Position Site L\R Cuff Size HR RR TEMP (F) WT  HT  BMI kg/m2 BSA m2 O2 Sat FR L/min FiO2 HC       04/13/2021 01:41 /68 Sitting    60 - R  98.5 145lbs 0oz 5'   28.32 1.67 98 %            Physical Examination  · Constitutional  o Appearance  o : well-nourished, in no acute distress  · Neck  o Inspection/Palpation  o : normal appearance, no masses or tenderness, trachea midline  o Range of Motion  o : cervical range of motion within normal limits  o Thyroid  o : gland size normal, nontender, no nodules or masses present on palpation  · Respiratory  o Respiratory Effort  o : breathing unlabored  o Inspection of Chest  o : normal appearance  o Auscultation of Lungs  o : normal breath sounds throughout inspiration and expiration  · Cardiovascular  o Heart  o :   § Auscultation of Heart  § : regular rate and rhythm, no murmurs, gallops or rubs  o Peripheral Vascular System  o :   § Extremities  § : no  clubbing or edema  · Skin and Subcutaneous Tissue  o General Inspection  o : no rashes or lesions present, no areas of discoloration  o Body Hair  o : hair normal for age, general body hair distribution normal for age  o Digits and Nails  o : no clubbing, cyanosis, deformities or edema present, normal appearing nails  · Neurologic  o Mental Status Examination  o :   § Orientation  § : grossly oriented to person, place and time  o Gait and Station  o : normal gait, able to stand without difficulty  · Psychiatric  o Judgement and Insight  o : judgment and insight intact  o Mood and Affect  o : mood normal, affect appropriate  o Presence of Abnormal Thoughts  o : no hallucinations, no delusions present, no psychotic thoughts          Assessment  · Anxiety disorder     300.00/F41.9  · Essential hypertension     401.9/I10  · Major depressive disorder     296.20/F32.2  · Overweight (BMI 25.0-29.9)     278.02/E66.3      Plan  · Orders  o ACO-39: Current medications updated and reviewed (1159F, ) - - 04/13/2021  · Medications  o Wellbutrin  mg oral tablet extended release 24 hr   SIG: take 1 tablet (150 mg) by oral route once daily   DISP: (30) Tablet with 2 refills  Prescribed on 04/13/2021     o phentermine 37.5 mg oral tablet   SIG: take 1 tablet (37.5 mg) by oral route once daily before breakfast   DISP: (30) Tablet with 0 refills  Prescribed on 04/13/2021     o Medications have been Reconciled  o Transition of Care or Provider Policy  · Instructions  o Discussed the need for therapy, either with a certified counselor, psychologist, and/or family . If no improvement is noted or worsening of their condition, return to office or ER. But also discussed with patient that if they are non-responsive to the type of medication they may need to see a psychiatrist for further evaluation and management.  o Patient was given an SSRI/SSNRI medication and warned of possible side effects of the medication including  "potential for increased risk of suicidal thoughts and feelings. Patient was instructed that if they begin to exhibit any of these effects they will discontinue the medication immediately and contact our office or the ER ASAP.  o Patient agrees to a \"No Self Harm\" contract. Patient will either call us, 1, ER, [facility], [facility].  o Patient advised to monitor blood pressure (B/P) at home and journal readings. Patient informed that a B/P reading at home of more than 130/80 is considered hypertension. For readings greater [id number]/90 or higher patient is advised to follow up in the office with readings for management. Patient advised to limit sodium intake.  o Patient was educated/instructed on their diagnosis, treatment and medications prior to discharge from the clinic today.  o Minutes spent with patient including greater than 50% in Education/Counseling/Care Coordination.  o Time spent with the patient was minutes, more than 50% face to face.  o Electronically Identified Patient Education Materials Provided Electronically  · Disposition  o Call or Return if symptoms worsen or persist.  o Follow up in 1 month            Electronically Signed by: BELLE Worthy -Author on April 13, 2021 04:20:23 PM  "

## 2021-05-14 NOTE — PROGRESS NOTES
Progress Note      Patient Name: Janna Rich   Patient ID: [id number]   Sex: Female   YOB: 1960    Primary Care Provider: Shan ODONNELL   Referring Provider: Jered Siddiqui MD    Visit Date: February 9, 2021    Provider: BELLE Worthy   Location: [facility]   Location Address: [address] [place] [address], [address] [place] [address]  [id number]   Location Phone: [phone]          Chief Complaint  · Elevated blood pressure  · Hematuria and urine frequency       History Of Present Illness  Janna Rich is a 60 year old /White female who presents for evaluation and treatment of:      Patient presents to the clinic today with acute complaints of hematuria, frequency, urgency, nausea, and flank pain. She states that she did recently have dental work done and she thought she had symptoms then, but was on two rounds of ampicillin. She reports that the hematuria started yesterday. She has taken some Tylenol and ibuprofen with some relief. She denies any vomiting or fevers.          Past Medical History  Disease Name Date Onset Notes   AC joint arthropathy 08/16/2016 --    Anxiety --  --    Arthritis --  --    Carpal tunnel syndrome of left wrist 05/14/2016 --    Cervical disc herniation 12/29/2016 --    Cervical radiculopathy 12/04/2017 --    Cervicalgia 12/04/2017 --    Cold sore --  --    Decreased sensation of lower extremity 12/04/2017 bilateral   Depression --  --    Facial aging --  --    Finger numbness 05/14/2016 --    Gastric reflux --  --    Hand numbness 02/19/2018 The patient notes a two year history of hand numbness that began in the left hand. She was diagnosed with CTS and underwent carpal tunnel release without benefit. Approximately 18 months ago, she developed numbness in the right hand. On exam, she has numbness in the 1st and 2nd digits of both hands along with numbness over the lateral aspect of the bilateral forearms. I would be concerned about  a C6 radiculopathy. She questions atrophy of the bilateral APB but her strength seems good. I will pursue a NCS. I did personally review her MRI of the C spine which revealed canal and foraminal stenosis at C5/C6. She is noted to have mild to moderate canal stenosis at this level.    Hand weakness 12/21/2017 --    Heart Murmur --  --    High blood pressure --  --    HTN (hypertension) --  --    Lumbago 12/04/2017 --    Migraine --  --    Muscle atrophy of upper extremity 12/21/2017 --    MVP (mitral valve prolapse) --  --    Night sweats --  --    Paresthesia 12/21/2017 --    Seasonal allergies --  --    Subacromial impingement, left 08/16/2016 --    Tremor 02/19/2018 The patient is noted to have a mild tremor that could be medication related.    Trigger finger of left thumb 12/29/2016 --          Past Surgical History  Procedure Name Date Notes   Abdominalplasty 1994 tummy tuck   Breast augmentation --  --    Carpal tunnel release --  --    Cesarian Section 1983 1986 --    Cholecystectomies, laparoscopic --  --    Colonoscopy 2011, 2013 --    EGD 2013 --    Hysterectomy 1990 --    Trigger Finger 12/29/16 Right Trigger Thumb Release - Dr Kapoor   Tubal ligation 1987 --          Medication List  Name Date Started Instructions   Dayvigo 10 mg oral tablet 02/09/2021 take 1 tablet (10 mg) by oral route once per night immediately before going to bed (Only if at least 7 hrs remain before time of waking) for 30 days   diclofenac sodium 75 mg oral tablet,delayed release (/EC) 11/30/2020 take 1 tablet (75 mg) by oral route 2 times per day   hydrochlorothiazide 25 mg oral tablet 08/21/2020 take 1 tablet (25 mg) by oral route once daily   LOSARTAN POTASSIUM 50 MG TAB 02/09/2021 2 tablets once a day   Paxil 20 mg oral tablet  take 1 tablet (20 mg) by oral route once daily   Toprol XL 25 mg oral tablet extended release 24 hr 01/03/2020 take 0.5 tablet by oral route daily for 30 days         Allergy List  Allergen Name Date  Reaction Notes   Cipro --  --  --    Codeine Sulfate --  --  --    morphine --  --  --          Family Medical History  Disease Name Relative/Age Notes   - No Family History of Colorectal Cancer  --          Social History  Finding Status Start/Stop Quantity Notes   Alcohol Light --/-- --  04/24/2018 - 1-2 beers month   Denies substance abuse --  --/-- --  --    Tobacco Never --/-- --  --          Immunizations  NameDate Admin Mfg Trade Name Lot Number Route Inj VIS Given VIS Publication   Tmgcelenz14/10/2020 SKB Fluarix, quadrivalent, preservative free [id number] NE NE 02/09/2021    Comments:          Review of Systems  · Constitutional  o Denies  o : fever, fatigue, weight loss, weight gain  · Cardiovascular  o Denies  o : lower extremity edema, claudication, chest pressure, palpitations  · Respiratory  o Denies  o : shortness of breath, wheezing, cough, hemoptysis, dyspnea on exertion  · Gastrointestinal  o Denies  o : vomiting, diarrhea, constipation, abdominal pain  · Genitourinary  o Admits  o : frequency, hematuria      Vitals  Date Time BP Position Site L\R Cuff Size HR RR TEMP (F) WT  HT  BMI kg/m2 BSA m2 O2 Sat FR L/min FiO2 HC       02/09/2021 07:44 /62 Sitting    59 - R  98.3 138lbs 0oz 5'   26.95 1.63 97 %            Physical Examination  · Constitutional  o Appearance  o : well-nourished, in no acute distress  · Eyes  o Conjunctivae  o : conjunctivae normal  o Sclerae  o : sclerae white  o Pupils and Irises  o : pupils equal and round  o Eyelids/Ocular Adnexae  o : eyelid appearance normal, no exudates present  · Neck  o Inspection/Palpation  o : normal appearance, no masses or tenderness, trachea midline  o Range of Motion  o : cervical range of motion within normal limits  o Thyroid  o : gland size normal, nontender, no nodules or masses present on palpation  · Respiratory  o Respiratory Effort  o : breathing unlabored  o Inspection of Chest  o : normal appearance  o Auscultation of Lungs  o :  normal breath sounds throughout inspiration and expiration  · Cardiovascular  o Heart  o :   § Auscultation of Heart  § : regular rate and rhythm, no murmurs, gallops or rubs  o Peripheral Vascular System  o :   § Pedal Pulses  § : pulses 2 bilaterally  § Extremities  § : no clubbing or edema  · Musculoskeletal  o Spine  o :   § Inspection/Palpation  § : CVA tenderness noted on right    § Range of Motion  § : spine range of motion normal  o Right Upper Extremity  o :   § Inspection/Palpation  § : no tenderness to palpation, ROM normal  o Left Upper Extremity  o :   § Inspection/Palpation  § : no tenderness to palpation, ROM normal  o Right Lower Extremity  o :   § Inspection/Palpation  § : no joint or limb tenderness to palpation, ROM normal  o Left Lower Extremity  o :   § Inspection/Palpation  § : no joint or limb tenderness to palpation, ROM normal  · Skin and Subcutaneous Tissue  o General Inspection  o : no rashes or lesions present, no areas of discoloration  o Body Hair  o : hair normal for age, general body hair distribution normal for age  o Digits and Nails  o : no clubbing, cyanosis, deformities or edema present, normal appearing nails  · Neurologic  o Mental Status Examination  o :   § Orientation  § : grossly oriented to person, place and time  o Gait and Station  o : normal gait, able to stand without difficulty  · Psychiatric  o Judgement and Insight  o : judgment and insight intact  o Mood and Affect  o : mood normal, affect appropriate  o Presence of Abnormal Thoughts  o : no hallucinations, no delusions present, no psychotic thoughts          Results  · In-Office Procedures  o Lab procedure  § IOP - Urinalysis without Microscopy (Clinitek) [facility] ([id number])   § Color Ur: Yellow   § Clarity Ur: Clear   § Glucose Ur Ql Strip: Negative   § Bilirub Ur Ql Strip: Negative   § Ketones Ur Ql Strip: Negative   § Sp Gr Ur Qn: 1.020   § Hgb Ur Ql Strip: Moderate   § pH Ur-LsCnc: 7.5   § Prot Ur Ql Strip: Negative    § Urobilinogen Ur Strip-mCnc: 2.0 E.U./dL   § Nitrite Ur Ql Strip: Negative   § WBC Est Ur Ql Strip: Large       Assessment  · Screening for depression     V79.0/Z13.89  · Screening for colon cancer     V76.51/Z12.11  · Visit for screening mammogram     V76.12/Z12.31  · Insomnia, unspecified     780.52/G47.00  · UTI (urinary tract infection)     599.0/N39.0      Plan  · Orders  o Annual depression screening, 15 minutes (, 00912) - V79.0/Z13.89 - 02/09/2021  o ACO-18: Positive screen for clinical depression using a standardized tool and a follow-up plan documented () - V79.0/Z13.89 - 02/09/2021  o Cologuard (07630) - V76.51/Z12.11 - 02/09/2021  o Screening Mammography; Bilateral 3D (17393, 30565, ) - V76.12/Z12.31 - 02/09/2021  o ACO-18: Positive screen for clinical depression using a standardized tool and a follow-up plan documented () - - 02/09/2021  o ACO-14: Influenza immunization administered or previously received [facility] () - - 02/09/2021  o ACO-39: Current medications updated and reviewed (, 1159F) - - 02/09/2021  o Urine Culture (Clean Catch) [facility] ([id number]) - 599.0/N39.0 - 02/09/2021  · Medications  o Bactrim -160 mg oral tablet   SIG: take 1 tablet by oral route every 12 hours for 10 days   DISP: (20) Tablet with 0 refills  Prescribed on 02/09/2021     o Dayvigo 10 mg oral tablet   SIG: take 1 tablet (10 mg) by oral route QHS (Only if at least 7 hrs remain before time of waking)   DISP: (30) Tablet with 1 refills  Adjusted on 02/09/2021     o Lunesta 3 mg oral tablet   SIG: take 1 tablet (3 mg) by oral route once daily at bedtime for 30 days   DISP: (30) tablets with 2 refills  Discontinued on 02/09/2021     o Medications have been Reconciled  o Transition of Care or Provider Policy  · Instructions  o Depression Screen completed and scanned into the EMR under the designated folder within the patient's documents.  o Today's PHQ-9 result is _7_  o Avoid any electronic use for at  least 30 minutes prior to bed time. Cell phone screens, tablets and TVs imitate daylight, so your brain can become confused on the time of day. No caffeine use in the late afternoon and evenings.  o Patient was educated/instructed on their diagnosis, treatment and medications prior to discharge from the clinic today.  o Minutes spent with patient including greater than 50% in Education/Counseling/Care Coordination.  o Time spent with the patient was minutes, more than 50% face to face.  o Electronically Identified Patient Education Materials Provided Electronically  · Disposition  o Call or Return if symptoms worsen or persist.  o Follow up as scheduled            Electronically Signed by: BELLE Worthy -Author on February 9, 2021 10:14:06 AM

## 2021-05-15 VITALS
WEIGHT: 136 LBS | HEART RATE: 66 BPM | TEMPERATURE: 97.8 F | DIASTOLIC BLOOD PRESSURE: 88 MMHG | OXYGEN SATURATION: 97 % | SYSTOLIC BLOOD PRESSURE: 152 MMHG | HEIGHT: 60 IN | RESPIRATION RATE: 16 BRPM | BODY MASS INDEX: 26.7 KG/M2

## 2021-05-15 VITALS
DIASTOLIC BLOOD PRESSURE: 86 MMHG | OXYGEN SATURATION: 100 % | SYSTOLIC BLOOD PRESSURE: 145 MMHG | RESPIRATION RATE: 12 BRPM | TEMPERATURE: 97.7 F | BODY MASS INDEX: 25.91 KG/M2 | HEIGHT: 60 IN | HEART RATE: 76 BPM | WEIGHT: 132 LBS

## 2021-05-15 VITALS
HEART RATE: 73 BPM | DIASTOLIC BLOOD PRESSURE: 94 MMHG | TEMPERATURE: 98.1 F | WEIGHT: 131 LBS | SYSTOLIC BLOOD PRESSURE: 158 MMHG | BODY MASS INDEX: 25.72 KG/M2 | OXYGEN SATURATION: 96 % | HEIGHT: 60 IN | RESPIRATION RATE: 16 BRPM

## 2021-05-16 VITALS
HEART RATE: 60 BPM | WEIGHT: 129 LBS | HEIGHT: 61 IN | SYSTOLIC BLOOD PRESSURE: 152 MMHG | DIASTOLIC BLOOD PRESSURE: 98 MMHG | BODY MASS INDEX: 24.35 KG/M2

## 2021-05-16 VITALS
OXYGEN SATURATION: 98 % | RESPIRATION RATE: 16 BRPM | DIASTOLIC BLOOD PRESSURE: 69 MMHG | SYSTOLIC BLOOD PRESSURE: 136 MMHG | TEMPERATURE: 98 F | BODY MASS INDEX: 25.58 KG/M2 | HEART RATE: 75 BPM | HEIGHT: 60 IN | WEIGHT: 130.31 LBS

## 2021-05-16 VITALS
WEIGHT: 129.25 LBS | DIASTOLIC BLOOD PRESSURE: 64 MMHG | HEART RATE: 74 BPM | HEIGHT: 60 IN | BODY MASS INDEX: 25.38 KG/M2 | SYSTOLIC BLOOD PRESSURE: 125 MMHG

## 2021-05-16 VITALS
WEIGHT: 131 LBS | SYSTOLIC BLOOD PRESSURE: 143 MMHG | HEIGHT: 60 IN | DIASTOLIC BLOOD PRESSURE: 85 MMHG | HEART RATE: 74 BPM | BODY MASS INDEX: 25.72 KG/M2

## 2021-05-16 VITALS
HEART RATE: 72 BPM | WEIGHT: 133 LBS | DIASTOLIC BLOOD PRESSURE: 76 MMHG | SYSTOLIC BLOOD PRESSURE: 122 MMHG | BODY MASS INDEX: 25.11 KG/M2 | HEIGHT: 61 IN

## 2021-05-22 ENCOUNTER — TRANSCRIBE ORDERS (OUTPATIENT)
Dept: ADMINISTRATIVE | Facility: HOSPITAL | Age: 61
End: 2021-05-22

## 2021-05-22 DIAGNOSIS — Z12.31 SCREENING MAMMOGRAM, ENCOUNTER FOR: Primary | ICD-10-CM

## 2021-06-05 NOTE — PROGRESS NOTES
Progress Note      Patient Name: Janna Rich   Patient ID: [id number]   Sex: Female   YOB: 1960    Primary Care Provider: Shan ODONNELL   Referring Provider: Jered Siddiqui MD    Visit Date: May 12, 2021    Provider: BELLE Worthy   Location: [facility]   Location Address: [address] [place] [address], [address]  [id number]   Location Phone: [phone]          Chief Complaint  · Follow up on weight loss medication       History Of Present Illness  Janna Rich is a 61 year old /White female who presents for evaluation and treatment of:      Patient presents to the office today for 1 month follow-up regarding her weight management.  Patient's current weight is 136 pounds which shows a 9 pound weight loss since her last visit.  Patient denies any chest pain shortness breath or palpitations on the medication.  She denies any constipation or dry mouth.  Blood pressure is 124/61.  She does state that she continues to have difficulty with her sleeping.  Patient has tried trazodone, Ambien, Lunesta, Dayvigo without any resolution in her sleep pattern.  Patient states that most of these medications caused adverse side effect such as night terrors.  I did discuss trying a trial of Belsomra to see if this improves her sleep.  She states that she is getting approximately 4 to 5 hours throughout the night as she gets up multiple times due to the hot flashes and night sweats.  Patient also states that she is developing a upper respiratory infection and she is scheduled to leave for [facility] in 3 days.       Past Medical History  Disease Name Date Onset Notes   AC joint arthropathy 08/16/2016 --    Anxiety --  --    Arthritis --  --    Carpal tunnel syndrome of left wrist 05/14/2016 --    Cervical disc herniation 12/29/2016 --    Cervical radiculopathy 12/04/2017 --    Cervicalgia 12/04/2017 --    Cold sore --  --    Decreased sensation of lower  extremity 12/04/2017 bilateral   Depression --  --    Facial aging --  --    Finger numbness 05/14/2016 --    Gastric reflux --  --    Hand numbness 02/19/2018 The patient notes a two year history of hand numbness that began in the left hand. She was diagnosed with CTS and underwent carpal tunnel release without benefit. Approximately 18 months ago, she developed numbness in the right hand. On exam, she has numbness in the 1st and 2nd digits of both hands along with numbness over the lateral aspect of the bilateral forearms. I would be concerned about a C6 radiculopathy. She questions atrophy of the bilateral APB but her strength seems good. I will pursue a NCS. I did personally review her MRI of the C spine which revealed canal and foraminal stenosis at C5/C6. She is noted to have mild to moderate canal stenosis at this level.    Hand weakness 12/21/2017 --    Heart Murmur --  --    High blood pressure --  --    HTN (hypertension) --  --    Lumbago 12/04/2017 --    Migraine --  --    Muscle atrophy of upper extremity 12/21/2017 --    MVP (mitral valve prolapse) --  --    Night sweats --  --    Paresthesia 12/21/2017 --    Seasonal allergies --  --    Subacromial impingement, left 08/16/2016 --    Tremor 02/19/2018 The patient is noted to have a mild tremor that could be medication related.    Trigger finger of left thumb 12/29/2016 --          Past Surgical History  Procedure Name Date Notes   Abdominalplasty 1994 tummy tu   Breast augmentation --  --    Carpal tunnel release --  --    Cesarian Section 1983 1986 --    Cholecystectomies, laparoscopic --  --    Colonoscopy 2011, 2013 --    EGD 2013 --    Hysterectomy 1990 --    Trigger Finger 12/29/16 Right Trigger Thumb Release - Dr Kapoor   Tubal ligation 1987 --          Medication List  Name Date Started Instructions   LOSARTAN POTASSIUM 50 MG TAB 02/10/2021 2 tablets once a day   phentermine 37.5 mg oral tablet 04/13/2021 take 1 tablet (37.5 mg) by oral route  once daily before breakfast   spironolactone 25 mg oral tablet  take 1 tablet (25 mg) by oral route every day   Toprol XL 25 mg oral tablet extended release 24 hr 01/03/2020 take 0.5 tablet by oral route daily for 30 days   Wellbutrin  mg oral tablet extended release 24 hr 04/13/2021 take 1 tablet (150 mg) by oral route once daily         Allergy List  Allergen Name Date Reaction Notes   Cipro --  --  --    Codeine Sulfate --  --  --    morphine --  --  --        Allergies Reconciled  Family Medical History  Disease Name Relative/Age Notes   - No Family History of Colorectal Cancer  --          Social History  Finding Status Start/Stop Quantity Notes   Alcohol Light --/-- --  04/24/2018 - 1-2 beers month   Denies substance abuse --  --/-- --  --    Tobacco Never --/-- --  --          Immunizations  NameDate Admin Mfg Trade Name Lot Number Route Inj VIS Given VIS Publication   Kckaebihv15/10/2020 SKB Fluarix, quadrivalent, preservative free [id number] NE NE 02/09/2021    Comments:          Review of Systems  · Constitutional  o Denies  o : fever, fatigue, weight loss, weight gain  · Cardiovascular  o Denies  o : lower extremity edema, claudication, chest pressure, palpitations  · Respiratory  o Denies  o : shortness of breath, wheezing, cough, hemoptysis, dyspnea on exertion  · Gastrointestinal  o Denies  o : nausea, vomiting, diarrhea, constipation, abdominal pain      Vitals  Date Time BP Position Site L\R Cuff Size HR RR TEMP (F) WT  HT  BMI kg/m2 BSA m2 O2 Sat FR L/min FiO2        05/12/2021 08:00 /64 Sitting    68 - R  98.5 136lbs 0oz 5'   26.56 1.62 98 %            Physical Examination  · Constitutional  o Appearance  o : well-nourished, in no acute distress  · Neck  o Inspection/Palpation  o : normal appearance, no masses or tenderness, trachea midline  o Range of Motion  o : cervical range of motion within normal limits  o Thyroid  o : gland size normal, nontender, no nodules or masses present on  palpation  · Respiratory  o Respiratory Effort  o : breathing unlabored  o Inspection of Chest  o : normal appearance  o Auscultation of Lungs  o : normal breath sounds throughout inspiration and expiration  · Cardiovascular  o Heart  o :   § Auscultation of Heart  § : regular rate and rhythm, no murmurs, gallops or rubs  o Peripheral Vascular System  o :   § Extremities  § : no clubbing or edema  · Skin and Subcutaneous Tissue  o General Inspection  o : no rashes or lesions present, no areas of discoloration  o Body Hair  o : hair normal for age, general body hair distribution normal for age  o Digits and Nails  o : no clubbing, cyanosis, deformities or edema present, normal appearing nails  · Neurologic  o Mental Status Examination  o :   § Orientation  § : grossly oriented to person, place and time  o Gait and Station  o : normal gait, able to stand without difficulty  · Psychiatric  o Judgement and Insight  o : judgment and insight intact  o Mood and Affect  o : mood normal, affect appropriate  o Presence of Abnormal Thoughts  o : no hallucinations, no delusions present, no psychotic thoughts          Assessment  · Insomnia, unspecified     780.52/G47.00  · Upper respiratory infection     465.9/J06.9  · Overweight     278.02/E66.3      Plan  · Orders  o ACO-14: Influenza immunization administered or previously received [facility] () - - 05/12/2021  o ACO-39: Current medications updated and reviewed (1159F, ) - - 05/12/2021  · Medications  o Zithromax Z-Jordan 250 mg oral tablet   SIG: take 2 tablets (500 mg) by oral route once daily for 1 day then 1 tablet (250 mg) by oral route once daily for 4 days   DISP: (6) Tablet with 0 refills  Prescribed on 05/12/2021     o Belsomra 20 mg oral tablet   SIG: take 1 tablet (20 mg) by oral route once per night within 30 minutes of bedtime.   DISP: (10) Tablet with 0 refills  Prescribed on 05/12/2021     o phentermine 37.5 mg oral tablet   SIG: take 1 tablet (37.5 mg) by  oral route once daily before breakfast   DISP: (30) Tablet with 0 refills  Refilled on 05/12/2021     o Medications have been Reconciled  o Transition of Care or Provider Policy  · Instructions  o Avoid any electronic use for at least 30 minutes prior to bed time. Cell phone screens, tablets and TVs imitate daylight, so your brain can become confused on the time of day. No caffeine use in the late afternoon and evenings.  o Patient was educated/instructed on their diagnosis, treatment and medications prior to discharge from the clinic today.  o Minutes spent with patient including greater than 50% in Education/Counseling/Care Coordination.  o Time spent with the patient was minutes, more than 50% face to face.  o Electronically Identified Patient Education Materials Provided Electronically  · Disposition  o Call or Return if symptoms worsen or persist.  o Follow up in 1 month            Electronically Signed by: BELLE Worthy -Author on May 12, 2021 09:47:13 AM

## 2021-06-15 ENCOUNTER — OFFICE VISIT (OUTPATIENT)
Dept: FAMILY MEDICINE CLINIC | Facility: CLINIC | Age: 61
End: 2021-06-15

## 2021-06-15 VITALS
HEART RATE: 82 BPM | SYSTOLIC BLOOD PRESSURE: 100 MMHG | HEIGHT: 60 IN | BODY MASS INDEX: 26.5 KG/M2 | RESPIRATION RATE: 16 BRPM | DIASTOLIC BLOOD PRESSURE: 72 MMHG | WEIGHT: 135 LBS | TEMPERATURE: 96.5 F | OXYGEN SATURATION: 99 %

## 2021-06-15 DIAGNOSIS — E66.3 OVERWEIGHT: Primary | ICD-10-CM

## 2021-06-15 DIAGNOSIS — G47.00 INSOMNIA, UNSPECIFIED TYPE: ICD-10-CM

## 2021-06-15 PROCEDURE — 99213 OFFICE O/P EST LOW 20 MIN: CPT | Performed by: NURSE PRACTITIONER

## 2021-06-15 RX ORDER — HYDROCHLOROTHIAZIDE 25 MG/1
25 TABLET ORAL DAILY PRN
COMMUNITY
Start: 2021-05-13 | End: 2022-03-15 | Stop reason: ALTCHOICE

## 2021-06-15 RX ORDER — SUVOREXANT 20 MG/1
TABLET, FILM COATED ORAL
COMMUNITY
Start: 2021-05-25 | End: 2021-06-15 | Stop reason: ALTCHOICE

## 2021-06-15 RX ORDER — AMITRIPTYLINE HYDROCHLORIDE 50 MG/1
50 TABLET, FILM COATED ORAL NIGHTLY
Qty: 30 TABLET | Refills: 2 | Status: SHIPPED | OUTPATIENT
Start: 2021-06-15 | End: 2021-09-13 | Stop reason: SDUPTHER

## 2021-06-15 RX ORDER — BUPROPION HYDROCHLORIDE 150 MG/1
TABLET ORAL
COMMUNITY
Start: 2021-04-13 | End: 2021-08-02 | Stop reason: SDUPTHER

## 2021-06-15 RX ORDER — PHENTERMINE HYDROCHLORIDE 37.5 MG/1
TABLET ORAL
COMMUNITY
Start: 2021-05-12 | End: 2021-06-15 | Stop reason: SDUPTHER

## 2021-06-15 RX ORDER — VALSARTAN 40 MG/1
TABLET ORAL
COMMUNITY
End: 2021-08-02 | Stop reason: SDUPTHER

## 2021-06-15 RX ORDER — PHENTERMINE HYDROCHLORIDE 37.5 MG/1
37.5 TABLET ORAL
Qty: 30 TABLET | Refills: 0 | Status: SHIPPED | OUTPATIENT
Start: 2021-06-15 | End: 2021-09-13 | Stop reason: ALTCHOICE

## 2021-06-15 NOTE — PATIENT INSTRUCTIONS
Exercising to Lose Weight  Exercise is structured, repetitive physical activity to improve fitness and health. Getting regular exercise is important for everyone. It is especially important if you are overweight. Being overweight increases your risk of heart disease, stroke, diabetes, high blood pressure, and several types of cancer. Reducing your calorie intake and exercising can help you lose weight.  Exercise is usually categorized as moderate or vigorous intensity. To lose weight, most people need to do a certain amount of moderate-intensity or vigorous-intensity exercise each week.  Moderate-intensity exercise    Moderate-intensity exercise is any activity that gets you moving enough to burn at least three times more energy (calories) than if you were sitting.  Examples of moderate exercise include:  · Walking a mile in 15 minutes.  · Doing light yard work.  · Biking at an easy pace.  Most people should get at least 150 minutes (2 hours and 30 minutes) a week of moderate-intensity exercise to maintain their body weight.  Vigorous-intensity exercise  Vigorous-intensity exercise is any activity that gets you moving enough to burn at least six times more calories than if you were sitting. When you exercise at this intensity, you should be working hard enough that you are not able to carry on a conversation.  Examples of vigorous exercise include:  · Running.  · Playing a team sport, such as football, basketball, and soccer.  · Jumping rope.  Most people should get at least 75 minutes (1 hour and 15 minutes) a week of vigorous-intensity exercise to maintain their body weight.  How can exercise affect me?  When you exercise enough to burn more calories than you eat, you lose weight. Exercise also reduces body fat and builds muscle. The more muscle you have, the more calories you burn. Exercise also:  · Improves mood.  · Reduces stress and tension.  · Improves your overall fitness, flexibility, and  endurance.  · Increases bone strength.  The amount of exercise you need to lose weight depends on:  · Your age.  · The type of exercise.  · Any health conditions you have.  · Your overall physical ability.  Talk to your health care provider about how much exercise you need and what types of activities are safe for you.  What actions can I take to lose weight?  Nutrition    · Make changes to your diet as told by your health care provider or diet and nutrition specialist (dietitian). This may include:  ? Eating fewer calories.  ? Eating more protein.  ? Eating less unhealthy fats.  ? Eating a diet that includes fresh fruits and vegetables, whole grains, low-fat dairy products, and lean protein.  ? Avoiding foods with added fat, salt, and sugar.  · Drink plenty of water while you exercise to prevent dehydration or heat stroke.  Activity  · Choose an activity that you enjoy and set realistic goals. Your health care provider can help you make an exercise plan that works for you.  · Exercise at a moderate or vigorous intensity most days of the week.  ? The intensity of exercise may vary from person to person. You can tell how intense a workout is for you by paying attention to your breathing and heartbeat. Most people will notice their breathing and heartbeat get faster with more intense exercise.  · Do resistance training twice each week, such as:  ? Push-ups.  ? Sit-ups.  ? Lifting weights.  ? Using resistance bands.  · Getting short amounts of exercise can be just as helpful as long structured periods of exercise. If you have trouble finding time to exercise, try to include exercise in your daily routine.  ? Get up, stretch, and walk around every 30 minutes throughout the day.  ? Go for a walk during your lunch break.  ? Park your car farther away from your destination.  ? If you take public transportation, get off one stop early and walk the rest of the way.  ? Make phone calls while standing up and walking  around.  ? Take the stairs instead of elevators or escalators.  · Wear comfortable clothes and shoes with good support.  · Do not exercise so much that you hurt yourself, feel dizzy, or get very short of breath.  Where to find more information  · [place] Department of Health and Human Services: www.hhs.gov  · Centers for Disease Control and Prevention (CDC): www.cdc.gov  Contact a health care provider:  · Before starting a new exercise program.  · If you have questions or concerns about your weight.  · If you have a medical problem that keeps you from exercising.  Get help right away if you have any of the following while exercising:  · Injury.  · Dizziness.  · Difficulty breathing or shortness of breath that does not go away when you stop exercising.  · Chest pain.  · Rapid heartbeat.  Summary  · Being overweight increases your risk of heart disease, stroke, diabetes, high blood pressure, and several types of cancer.  · Losing weight happens when you burn more calories than you eat.  · Reducing the amount of calories you eat in addition to getting regular moderate or vigorous exercise each week helps you lose weight.  This information is not intended to replace advice given to you by your health care provider. Make sure you discuss any questions you have with your health care provider.  Document Revised: 12/31/2018 Document Reviewed: 12/31/2018  [employer] Patient Education © 2021 [employer]    Insomnia  Insomnia is a sleep disorder that makes it difficult to fall asleep or stay asleep. Insomnia can cause fatigue, low energy, difficulty concentrating, mood swings, and poor performance at work or school.  There are three different ways to classify insomnia:  · Difficulty falling asleep.  · Difficulty staying asleep.  · Waking up too early in the morning.  Any type of insomnia can be long-term (chronic) or short-term (acute). Both are common. Short-term insomnia usually lasts for three months or less. Chronic insomnia  occurs at least three times a week for longer than three months.  What are the causes?  Insomnia may be caused by another condition, situation, or substance, such as:  · Anxiety.  · Certain medicines.  · Gastroesophageal reflux disease (GERD) or other gastrointestinal conditions.  · Asthma or other breathing conditions.  · Restless legs syndrome, sleep apnea, or other sleep disorders.  · Chronic pain.  · Menopause.  · Stroke.  · Abuse of alcohol, tobacco, or illegal drugs.  · Mental health conditions, such as depression.  · Caffeine.  · Neurological disorders, such as Alzheimer's disease.  · An overactive thyroid (hyperthyroidism).  Sometimes, the cause of insomnia may not be known.  What increases the risk?  Risk factors for insomnia include:  · Gender. Women are affected more often than men.  · Age. Insomnia is more common as you get older.  · Stress.  · Lack of exercise.  · Irregular work schedule or working night shifts.  · Traveling between different time zones.  · Certain medical and mental health conditions.  What are the signs or symptoms?  If you have insomnia, the main symptom is having trouble falling asleep or having trouble staying asleep. This may lead to other symptoms, such as:  · Feeling fatigued or having low energy.  · Feeling nervous about going to sleep.  · Not feeling rested in the morning.  · Having trouble concentrating.  · Feeling irritable, anxious, or depressed.  How is this diagnosed?  This condition may be diagnosed based on:  · Your symptoms and medical history. Your health care provider may ask about:  ? Your sleep habits.  ? Any medical conditions you have.  ? Your mental health.  · A physical exam.  How is this treated?  Treatment for insomnia depends on the cause. Treatment may focus on treating an underlying condition that is causing insomnia. Treatment may also include:  · Medicines to help you sleep.  · Counseling or therapy.  · Lifestyle adjustments to help you sleep  better.  Follow these instructions at home:  Eating and drinking    · Limit or avoid alcohol, caffeinated beverages, and cigarettes, especially close to bedtime. These can disrupt your sleep.  · Do not eat a large meal or eat spicy foods right before bedtime. This can lead to digestive discomfort that can make it hard for you to sleep.  Sleep habits    · Keep a sleep diary to help you and your health care provider figure out what could be causing your insomnia. Write down:  ? When you sleep.  ? When you wake up during the night.  ? How well you sleep.  ? How rested you feel the next day.  ? Any side effects of medicines you are taking.  ? What you eat and drink.  · Make your bedroom a dark, comfortable place where it is easy to fall asleep.  ? Put up shades or blackout curtains to block light from outside.  ? Use a white noise machine to block noise.  ? Keep the temperature cool.  · Limit screen use before bedtime. This includes:  ? Watching TV.  ? Using your smartphone, tablet, or computer.  · Stick to a routine that includes going to bed and waking up at the same times every day and night. This can help you fall asleep faster. Consider making a quiet activity, such as reading, part of your nighttime routine.  · Try to avoid taking naps during the day so that you sleep better at night.  · Get out of bed if you are still awake after 15 minutes of trying to sleep. Keep the lights down, but try reading or doing a quiet activity. When you feel sleepy, go back to bed.  General instructions  · Take over-the-counter and prescription medicines only as told by your health care provider.  · Exercise regularly, as told by your health care provider. Avoid exercise starting several hours before bedtime.  · Use relaxation techniques to manage stress. Ask your health care provider to suggest some techniques that may work well for you. These may include:  ? Breathing exercises.  ? Routines to release muscle tension.  ? Visualizing  peaceful scenes.  · Make sure that you drive carefully. Avoid driving if you feel very sleepy.  · Keep all follow-up visits as told by your health care provider. This is important.  Contact a health care provider if:  · You are tired throughout the day.  · You have trouble in your daily routine due to sleepiness.  · You continue to have sleep problems, or your sleep problems get worse.  Get help right away if:  · You have serious thoughts about hurting yourself or someone else.  If you ever feel like you may hurt yourself or others, or have thoughts about taking your own life, get help right away. You can go to your nearest emergency department or call:  · Your local emergency services (911 in the [place]  · A suicide crisis helpline, such as the National Suicide Prevention Lifeline at 1-657.412.4480. This is open 24 hours a day.  Summary  · Insomnia is a sleep disorder that makes it difficult to fall asleep or stay asleep.  · Insomnia can be long-term (chronic) or short-term (acute).  · Treatment for insomnia depends on the cause. Treatment may focus on treating an underlying condition that is causing insomnia.  · Keep a sleep diary to help you and your health care provider figure out what could be causing your insomnia.  This information is not intended to replace advice given to you by your health care provider. Make sure you discuss any questions you have with your health care provider.  Document Revised: 11/30/2018 Document Reviewed: 09/27/2018  [employer] Patient Education © 2021 [employer]

## 2021-06-15 NOTE — PROGRESS NOTES
"Chief Complaint  Weight Loss (f/u) and Insomnia (f/u)    Subjective          Janna Rich presents to [facility]  Presents to the office today for 1 month follow-up during her weight management.  Patient's current weight is 35 pounds.  She did lose 1 pound since her last visit.  Patient did go on vacation for over a week to [facility].  Patient is within a healthy weight but I explained that due to her starting out overweight that we can continue her phentermine for 1 more month to achieve the benefits.  Patient's blood pressure arrival is 100/72.  She denies any chest pain shortness breath palpitations this time.  She denies any adverse side effects of the medications.  Also states that she continues to have insomnia.  Patient has tried the dayvigo, Lunesta, Ambien without any improvement.  I did explain that we would try amitriptyline.  Patient has tried hesitant in the past.  States that she is getting approximately 4 hours of sleep each night.      Objective   Vital Signs:   /72 (BP Location: Right arm, Patient Position: Sitting, Cuff Size: Adult)   Pulse 82   Temp 96.5 °F (35.8 °C) (Infrared)   Resp 16   Ht 152.4 cm (60\")   Wt 61.2 kg (135 lb)   SpO2 99%   BMI 26.37 kg/m²     Physical Exam  Vitals reviewed.   Constitutional:       Appearance: Normal appearance.   Cardiovascular:      Rate and Rhythm: Normal rate and regular rhythm.      Heart sounds: Normal heart sounds, S1 normal and S2 normal. No murmur heard.     Pulmonary:      Effort: Pulmonary effort is normal. No respiratory distress.      Breath sounds: Normal breath sounds.   Skin:     General: Skin is warm and dry.   Neurological:      Mental Status: She is alert and oriented to person, place, and time.   Psychiatric:         Attention and Perception: Attention normal.         Mood and Affect: Mood normal.         Behavior: Behavior normal.        Result Review :                Assessment and Plan  "   Diagnoses and all orders for this visit:    1. Overweight (Primary)  -     phentermine (ADIPEX-P) 37.5 MG tablet; Take 1 tablet by mouth Every Morning Before Breakfast.  Dispense: 30 tablet; Refill: 0    2. Insomnia, unspecified type  -     amitriptyline (ELAVIL) 50 MG tablet; Take 1 tablet by mouth Every Night.  Dispense: 30 tablet; Refill: 2        Follow Up   Return in about 3 months (around 9/15/2021) for Recheck.  Patient was given instructions and counseling regarding her condition or for health maintenance advice. Please see specific information pulled into the AVS if appropriate.

## 2021-07-15 VITALS
WEIGHT: 136 LBS | DIASTOLIC BLOOD PRESSURE: 64 MMHG | HEART RATE: 68 BPM | BODY MASS INDEX: 26.7 KG/M2 | OXYGEN SATURATION: 98 % | TEMPERATURE: 98.5 F | HEIGHT: 60 IN | SYSTOLIC BLOOD PRESSURE: 124 MMHG

## 2021-07-16 DIAGNOSIS — E66.3 OVERWEIGHT: ICD-10-CM

## 2021-07-19 ENCOUNTER — HOSPITAL ENCOUNTER (OUTPATIENT)
Dept: MAMMOGRAPHY | Facility: HOSPITAL | Age: 61
Discharge: HOME | End: 2021-07-19
Admitting: NURSE PRACTITIONER

## 2021-07-19 DIAGNOSIS — Z12.31 SCREENING MAMMOGRAM, ENCOUNTER FOR: ICD-10-CM

## 2021-07-19 PROCEDURE — 77063 BREAST TOMOSYNTHESIS BI: CPT

## 2021-07-19 PROCEDURE — 77067 SCR MAMMO BI INCL CAD: CPT

## 2021-07-19 RX ORDER — PHENTERMINE HYDROCHLORIDE 37.5 MG/1
37.5 TABLET ORAL
Qty: 30 TABLET | Refills: 0 | OUTPATIENT
Start: 2021-07-19

## 2021-08-02 RX ORDER — BUPROPION HYDROCHLORIDE 150 MG/1
150 TABLET ORAL EVERY MORNING
Qty: 90 TABLET | Refills: 0 | Status: SHIPPED | OUTPATIENT
Start: 2021-08-02 | End: 2022-03-15 | Stop reason: SDUPTHER

## 2021-08-02 RX ORDER — VALSARTAN 40 MG/1
40 TABLET ORAL 2 TIMES DAILY
Qty: 180 TABLET | Refills: 0 | Status: SHIPPED | OUTPATIENT
Start: 2021-08-02 | End: 2021-12-16 | Stop reason: SDUPTHER

## 2021-08-02 NOTE — TELEPHONE ENCOUNTER
Caller: Janna Rich    Relationship: Self    Best call back number: [phone]    Medication needed:   Requested Prescriptions     Pending Prescriptions Disp Refills   • buPROPion XL (WELLBUTRIN XL) 150 MG 24 hr tablet     • valsartan (DIOVAN) 40 MG tablet         When do you need the refill by: ASAP    What additional details did the patient provide when requesting the medication: PATIENT STATED: OUT OF WELLBUTRIN AND ONLY 1 VALSARTAN REMAINING  Does the patient have less than a 3 day supply:  [x] Yes  [] No    What is the patient's preferred pharmacy: [facility]

## 2021-08-16 ENCOUNTER — TRANSCRIBE ORDERS (OUTPATIENT)
Dept: LAB | Facility: HOSPITAL | Age: 61
End: 2021-08-16

## 2021-08-16 ENCOUNTER — LAB (OUTPATIENT)
Dept: LAB | Facility: HOSPITAL | Age: 61
End: 2021-08-16

## 2021-08-16 DIAGNOSIS — I51.9 MYXEDEMA HEART DISEASE: Primary | ICD-10-CM

## 2021-08-16 DIAGNOSIS — I51.9 MYXEDEMA HEART DISEASE: ICD-10-CM

## 2021-08-16 DIAGNOSIS — E03.9 MYXEDEMA HEART DISEASE: ICD-10-CM

## 2021-08-16 DIAGNOSIS — E03.9 MYXEDEMA HEART DISEASE: Primary | ICD-10-CM

## 2021-08-16 PROCEDURE — 84481 FREE ASSAY (FT-3): CPT

## 2021-08-16 PROCEDURE — 36415 COLL VENOUS BLD VENIPUNCTURE: CPT

## 2021-08-16 PROCEDURE — 84443 ASSAY THYROID STIM HORMONE: CPT

## 2021-08-16 PROCEDURE — 80048 BASIC METABOLIC PNL TOTAL CA: CPT

## 2021-08-16 PROCEDURE — 84439 ASSAY OF FREE THYROXINE: CPT

## 2021-08-17 LAB
ANION GAP SERPL CALCULATED.3IONS-SCNC: 6.5 MMOL/L (ref 5–15)
BUN SERPL-MCNC: 11 MG/DL (ref 8–23)
BUN/CREAT SERPL: 11.7 (ref 7–25)
CALCIUM SPEC-SCNC: 8.9 MG/DL (ref 8.6–10.5)
CHLORIDE SERPL-SCNC: 104 MMOL/L (ref 98–107)
CO2 SERPL-SCNC: 26.5 MMOL/L (ref 22–29)
CREAT SERPL-MCNC: 0.94 MG/DL (ref 0.57–1)
GFR SERPL CREATININE-BSD FRML MDRD: 60 ML/MIN/1.73
GLUCOSE SERPL-MCNC: 71 MG/DL (ref 65–99)
POTASSIUM SERPL-SCNC: 3.9 MMOL/L (ref 3.5–5.2)
SODIUM SERPL-SCNC: 137 MMOL/L (ref 136–145)
T3FREE SERPL-MCNC: 3.04 PG/ML (ref 2–4.4)
T4 FREE SERPL-MCNC: 1.16 NG/DL (ref 0.93–1.7)
TSH SERPL DL<=0.05 MIU/L-ACNC: 0.58 UIU/ML (ref 0.27–4.2)

## 2021-09-13 ENCOUNTER — OFFICE VISIT (OUTPATIENT)
Dept: FAMILY MEDICINE CLINIC | Facility: CLINIC | Age: 61
End: 2021-09-13

## 2021-09-13 VITALS
BODY MASS INDEX: 27.48 KG/M2 | OXYGEN SATURATION: 100 % | TEMPERATURE: 98.5 F | SYSTOLIC BLOOD PRESSURE: 142 MMHG | HEART RATE: 84 BPM | HEIGHT: 60 IN | DIASTOLIC BLOOD PRESSURE: 80 MMHG | WEIGHT: 140 LBS

## 2021-09-13 DIAGNOSIS — G47.00 INSOMNIA, UNSPECIFIED TYPE: ICD-10-CM

## 2021-09-13 DIAGNOSIS — M20.42 HAMMER TOES OF BOTH FEET: ICD-10-CM

## 2021-09-13 DIAGNOSIS — M20.41 HAMMER TOES OF BOTH FEET: ICD-10-CM

## 2021-09-13 DIAGNOSIS — M79.675 TOE PAIN, LEFT: Primary | ICD-10-CM

## 2021-09-13 PROBLEM — M20.40 HAMMER TOE: Status: ACTIVE | Noted: 2021-09-13

## 2021-09-13 PROBLEM — M20.40 HAMMER TOE: Status: RESOLVED | Noted: 2021-09-13 | Resolved: 2021-09-13

## 2021-09-13 PROCEDURE — 99213 OFFICE O/P EST LOW 20 MIN: CPT | Performed by: NURSE PRACTITIONER

## 2021-09-13 RX ORDER — AMITRIPTYLINE HYDROCHLORIDE 50 MG/1
50 TABLET, FILM COATED ORAL NIGHTLY
Qty: 90 TABLET | Refills: 1 | Status: SHIPPED | OUTPATIENT
Start: 2021-09-13 | End: 2022-03-15

## 2021-09-13 NOTE — PROGRESS NOTES
"Chief Complaint  Follow-up (3 months) and Toe Pain    Subjective          Janna Rich presents to [facility]  Patient presents to the office today for 3-month follow-up.  Patient does state that the amitriptyline is helping with her sleep at night.  She does request a refill the medication.  Patient states that she has been having left toe pain.  She states that she is also noticed calluses forming on her toes.  She denies any injury or trauma to the toes.  She does noticed that they changed in their positioning.    Toe Pain         Objective   Vital Signs:   /80   Pulse 84   Temp 98.5 °F (36.9 °C)   Ht 152.4 cm (60\")   Wt 63.5 kg (140 lb)   SpO2 100%   BMI 27.34 kg/m²     Physical Exam  Vitals reviewed.   Constitutional:       Appearance: Normal appearance.   Cardiovascular:      Rate and Rhythm: Normal rate and regular rhythm.      Heart sounds: Normal heart sounds, S1 normal and S2 normal. No murmur heard.     Pulmonary:      Effort: Pulmonary effort is normal. No respiratory distress.      Breath sounds: Normal breath sounds.   Feet:      Comments: Hammertoes noted to feet bilaterally on second third digits  Skin:     General: Skin is warm and dry.   Neurological:      Mental Status: She is alert and oriented to person, place, and time.   Psychiatric:         Attention and Perception: Attention normal.         Mood and Affect: Mood normal.         Behavior: Behavior normal.        Result Review :                Assessment and Plan    Diagnoses and all orders for this visit:    1. Toe pain, left (Primary)  -     Ambulatory Referral to Podiatry    2. Insomnia, unspecified type  -     amitriptyline (ELAVIL) 50 MG tablet; Take 1 tablet by mouth Every Night.  Dispense: 90 tablet; Refill: 1    3. Hammer toes of both feet        Follow Up   Return in about 6 months (around 3/13/2022) for Recheck.  Patient was given instructions and counseling regarding her condition or " for health maintenance advice. Please see specific information pulled into the AVS if appropriate.

## 2021-09-30 DIAGNOSIS — E66.3 OVERWEIGHT: ICD-10-CM

## 2021-09-30 RX ORDER — PHENTERMINE HYDROCHLORIDE 37.5 MG/1
37.5 TABLET ORAL
Qty: 30 TABLET | Refills: 0 | Status: CANCELLED | OUTPATIENT
Start: 2021-09-30

## 2021-10-01 DIAGNOSIS — E66.3 OVERWEIGHT: ICD-10-CM

## 2021-10-01 RX ORDER — PHENTERMINE HYDROCHLORIDE 37.5 MG/1
37.5 TABLET ORAL
Qty: 30 TABLET | Refills: 0 | OUTPATIENT
Start: 2021-10-01

## 2021-10-12 RX ORDER — PHENTERMINE HYDROCHLORIDE 37.5 MG/1
37.5 CAPSULE ORAL EVERY MORNING
Qty: 30 CAPSULE | Refills: 0 | Status: SHIPPED | OUTPATIENT
Start: 2021-10-12 | End: 2021-10-13

## 2021-10-13 ENCOUNTER — TELEPHONE (OUTPATIENT)
Dept: FAMILY MEDICINE CLINIC | Facility: CLINIC | Age: 61
End: 2021-10-13

## 2021-10-13 RX ORDER — PHENTERMINE HYDROCHLORIDE 37.5 MG/1
37.5 TABLET ORAL
Qty: 30 TABLET | Refills: 0 | Status: SHIPPED | OUTPATIENT
Start: 2021-10-13 | End: 2022-03-15

## 2021-10-13 NOTE — TELEPHONE ENCOUNTER
Caller: Janna Rich    Relationship: Self      Medication requested (name and dosage):   phentermine 37.5 MG capsule    Pharmacy where request should be sent:   [facility]  [phone]    Additional details provided by patient: PATIENT STATES THE ORIGINAL MEDICATION WAS CALLED INTO THE WRONG PHARMACY. SHE STATES THAT JIAN TOLD HER THAT THE CAPSULES WERE $200. SHE WOULD LIKE THE TABLETS (IF ABLE) CALLED INTO THE [facility]     Best call back number: [phone]     Does the patient have less than a 3 day supply:  [x] Yes  [] No

## 2021-11-19 ENCOUNTER — LAB (OUTPATIENT)
Dept: LAB | Facility: HOSPITAL | Age: 61
End: 2021-11-19

## 2021-11-19 ENCOUNTER — TRANSCRIBE ORDERS (OUTPATIENT)
Dept: LAB | Facility: HOSPITAL | Age: 61
End: 2021-11-19

## 2021-11-19 DIAGNOSIS — I51.9 MYXEDEMA HEART DISEASE: ICD-10-CM

## 2021-11-19 DIAGNOSIS — E03.9 MYXEDEMA HEART DISEASE: Primary | ICD-10-CM

## 2021-11-19 DIAGNOSIS — E03.9 MYXEDEMA HEART DISEASE: ICD-10-CM

## 2021-11-19 DIAGNOSIS — I51.9 MYXEDEMA HEART DISEASE: Primary | ICD-10-CM

## 2021-11-19 LAB
ANION GAP SERPL CALCULATED.3IONS-SCNC: 5.7 MMOL/L (ref 5–15)
BUN SERPL-MCNC: 25 MG/DL (ref 8–23)
BUN/CREAT SERPL: 23.8 (ref 7–25)
CALCIUM SPEC-SCNC: 9.6 MG/DL (ref 8.6–10.5)
CHLORIDE SERPL-SCNC: 101 MMOL/L (ref 98–107)
CO2 SERPL-SCNC: 27.3 MMOL/L (ref 22–29)
CREAT SERPL-MCNC: 1.05 MG/DL (ref 0.57–1)
GFR SERPL CREATININE-BSD FRML MDRD: 53 ML/MIN/1.73
GLUCOSE SERPL-MCNC: 81 MG/DL (ref 65–99)
POTASSIUM SERPL-SCNC: 4.8 MMOL/L (ref 3.5–5.2)
SODIUM SERPL-SCNC: 134 MMOL/L (ref 136–145)
T3FREE SERPL-MCNC: 3.66 PG/ML (ref 2–4.4)
T4 FREE SERPL-MCNC: 1.2 NG/DL (ref 0.93–1.7)
TSH SERPL DL<=0.05 MIU/L-ACNC: 0.48 UIU/ML (ref 0.27–4.2)

## 2021-11-19 PROCEDURE — 84439 ASSAY OF FREE THYROXINE: CPT

## 2021-11-19 PROCEDURE — 84481 FREE ASSAY (FT-3): CPT

## 2021-11-19 PROCEDURE — 80048 BASIC METABOLIC PNL TOTAL CA: CPT

## 2021-11-19 PROCEDURE — 84443 ASSAY THYROID STIM HORMONE: CPT

## 2021-11-19 PROCEDURE — 36415 COLL VENOUS BLD VENIPUNCTURE: CPT

## 2021-12-16 RX ORDER — VALSARTAN 40 MG/1
40 TABLET ORAL 2 TIMES DAILY
Qty: 180 TABLET | Refills: 0 | Status: SHIPPED | OUTPATIENT
Start: 2021-12-16 | End: 2022-03-15 | Stop reason: SDUPTHER

## 2022-03-14 ENCOUNTER — TRANSCRIBE ORDERS (OUTPATIENT)
Dept: LAB | Facility: HOSPITAL | Age: 62
End: 2022-03-14

## 2022-03-14 ENCOUNTER — LAB (OUTPATIENT)
Dept: LAB | Facility: HOSPITAL | Age: 62
End: 2022-03-14

## 2022-03-14 DIAGNOSIS — E03.9 HYPOTHYROIDISM, ADULT: Primary | ICD-10-CM

## 2022-03-14 DIAGNOSIS — E03.9 HYPOTHYROIDISM, ADULT: ICD-10-CM

## 2022-03-14 PROBLEM — R25.1 TREMOR: Status: ACTIVE | Noted: 2018-02-19

## 2022-03-14 PROBLEM — R01.1 HEART MURMUR: Status: ACTIVE | Noted: 2022-03-14

## 2022-03-14 PROBLEM — R29.898 HAND WEAKNESS: Status: ACTIVE | Noted: 2017-12-21

## 2022-03-14 PROBLEM — I10 HTN (HYPERTENSION): Status: ACTIVE | Noted: 2022-03-14

## 2022-03-14 PROBLEM — K21.9 GASTRIC REFLUX: Status: ACTIVE | Noted: 2022-03-14

## 2022-03-14 PROBLEM — R20.8 DECREASED SENSATION OF LOWER EXTREMITY: Status: ACTIVE | Noted: 2017-12-04

## 2022-03-14 PROBLEM — I34.1 MVP (MITRAL VALVE PROLAPSE): Status: ACTIVE | Noted: 2022-03-14

## 2022-03-14 PROBLEM — F32.A DEPRESSION: Status: ACTIVE | Noted: 2022-03-14

## 2022-03-14 PROBLEM — B00.1 COLD SORE: Status: ACTIVE | Noted: 2022-03-14

## 2022-03-14 PROBLEM — J30.2 SEASONAL ALLERGIC RHINITIS: Status: ACTIVE | Noted: 2022-03-14

## 2022-03-14 PROBLEM — G43.909 MIGRAINE: Status: ACTIVE | Noted: 2022-03-14

## 2022-03-14 PROBLEM — R20.0 HAND NUMBNESS: Status: ACTIVE | Noted: 2018-02-19

## 2022-03-14 PROBLEM — R20.2 PARESTHESIA: Status: ACTIVE | Noted: 2017-12-21

## 2022-03-14 PROBLEM — M54.12 CERVICAL RADICULOPATHY: Status: ACTIVE | Noted: 2017-12-04

## 2022-03-14 PROBLEM — M54.2 NECK PAIN: Status: ACTIVE | Noted: 2017-12-04

## 2022-03-14 PROBLEM — F41.9 ANXIETY: Status: ACTIVE | Noted: 2022-03-14

## 2022-03-14 PROBLEM — M62.58 MUSCLE ATROPHY OF UPPER EXTREMITY: Status: ACTIVE | Noted: 2017-12-21

## 2022-03-14 PROBLEM — R23.8 FACIAL AGING: Status: ACTIVE | Noted: 2022-03-14

## 2022-03-14 PROBLEM — M54.50 LOW BACK PAIN: Status: ACTIVE | Noted: 2017-12-04

## 2022-03-14 PROBLEM — M19.90 ARTHRITIS: Status: ACTIVE | Noted: 2022-03-14

## 2022-03-14 PROCEDURE — 84481 FREE ASSAY (FT-3): CPT

## 2022-03-14 PROCEDURE — 80053 COMPREHEN METABOLIC PANEL: CPT

## 2022-03-14 PROCEDURE — 36415 COLL VENOUS BLD VENIPUNCTURE: CPT

## 2022-03-14 PROCEDURE — 84439 ASSAY OF FREE THYROXINE: CPT

## 2022-03-14 PROCEDURE — 84443 ASSAY THYROID STIM HORMONE: CPT

## 2022-03-15 ENCOUNTER — OFFICE VISIT (OUTPATIENT)
Dept: FAMILY MEDICINE CLINIC | Facility: CLINIC | Age: 62
End: 2022-03-15

## 2022-03-15 VITALS
SYSTOLIC BLOOD PRESSURE: 118 MMHG | WEIGHT: 142.6 LBS | DIASTOLIC BLOOD PRESSURE: 70 MMHG | BODY MASS INDEX: 28 KG/M2 | HEIGHT: 60 IN | HEART RATE: 65 BPM | TEMPERATURE: 97.7 F | OXYGEN SATURATION: 98 %

## 2022-03-15 DIAGNOSIS — G47.00 INSOMNIA, UNSPECIFIED TYPE: ICD-10-CM

## 2022-03-15 DIAGNOSIS — Z13.220 SCREENING FOR LIPID DISORDERS: ICD-10-CM

## 2022-03-15 DIAGNOSIS — E05.90 HYPERTHYROIDISM: ICD-10-CM

## 2022-03-15 DIAGNOSIS — I10 PRIMARY HYPERTENSION: ICD-10-CM

## 2022-03-15 DIAGNOSIS — F41.9 ANXIETY: Primary | ICD-10-CM

## 2022-03-15 LAB
ALBUMIN SERPL-MCNC: 4.3 G/DL (ref 3.5–5.2)
ALBUMIN/GLOB SERPL: 1.7 G/DL
ALP SERPL-CCNC: 73 U/L (ref 39–117)
ALT SERPL W P-5'-P-CCNC: 20 U/L (ref 1–33)
AMPHET+METHAMPHET UR QL: NEGATIVE
AMPHETAMINE INTERNAL CONTROL: NORMAL
AMPHETAMINES UR QL: NEGATIVE
ANION GAP SERPL CALCULATED.3IONS-SCNC: 10 MMOL/L (ref 5–15)
AST SERPL-CCNC: 23 U/L (ref 1–32)
BARBITURATE INTERNAL CONTROL: NORMAL
BARBITURATES UR QL SCN: NEGATIVE
BENZODIAZ UR QL SCN: NEGATIVE
BENZODIAZEPINE INTERNAL CONTROL: NORMAL
BILIRUB SERPL-MCNC: 0.5 MG/DL (ref 0–1.2)
BUN SERPL-MCNC: 14 MG/DL (ref 8–23)
BUN/CREAT SERPL: 15.9 (ref 7–25)
BUPRENORPHINE INTERNAL CONTROL: NORMAL
BUPRENORPHINE SERPL-MCNC: NEGATIVE NG/ML
CALCIUM SPEC-SCNC: 9.4 MG/DL (ref 8.6–10.5)
CANNABINOIDS SERPL QL: NEGATIVE
CHLORIDE SERPL-SCNC: 101 MMOL/L (ref 98–107)
CO2 SERPL-SCNC: 28 MMOL/L (ref 22–29)
COCAINE INTERNAL CONTROL: NORMAL
COCAINE UR QL: NEGATIVE
CREAT SERPL-MCNC: 0.88 MG/DL (ref 0.57–1)
EGFRCR SERPLBLD CKD-EPI 2021: 73.9 ML/MIN/1.73
EXPIRATION DATE: NORMAL
GLOBULIN UR ELPH-MCNC: 2.6 GM/DL
GLUCOSE SERPL-MCNC: 95 MG/DL (ref 65–99)
Lab: NORMAL
MDMA (ECSTASY) INTERNAL CONTROL: NORMAL
MDMA UR QL SCN: NEGATIVE
METHADONE INTERNAL CONTROL: NORMAL
METHADONE UR QL SCN: NEGATIVE
METHAMPHETAMINE INTERNAL CONTROL: NORMAL
OPIATES INTERNAL CONTROL: NORMAL
OPIATES UR QL: NEGATIVE
OXYCODONE INTERNAL CONTROL: NORMAL
OXYCODONE UR QL SCN: NEGATIVE
PCP UR QL SCN: NEGATIVE
PHENCYCLIDINE INTERNAL CONTROL: NORMAL
POTASSIUM SERPL-SCNC: 4 MMOL/L (ref 3.5–5.2)
PROT SERPL-MCNC: 6.9 G/DL (ref 6–8.5)
SODIUM SERPL-SCNC: 139 MMOL/L (ref 136–145)
T3FREE SERPL-MCNC: 3.99 PG/ML (ref 2–4.4)
T4 FREE SERPL-MCNC: 1.49 NG/DL (ref 0.93–1.7)
THC INTERNAL CONTROL: NORMAL
TSH SERPL DL<=0.05 MIU/L-ACNC: 0.05 UIU/ML (ref 0.27–4.2)

## 2022-03-15 PROCEDURE — 80305 DRUG TEST PRSMV DIR OPT OBS: CPT | Performed by: NURSE PRACTITIONER

## 2022-03-15 PROCEDURE — 99214 OFFICE O/P EST MOD 30 MIN: CPT | Performed by: NURSE PRACTITIONER

## 2022-03-15 RX ORDER — BUPROPION HYDROCHLORIDE 150 MG/1
150 TABLET ORAL EVERY MORNING
Qty: 90 TABLET | Refills: 1 | Status: SHIPPED | OUTPATIENT
Start: 2022-03-15 | End: 2023-02-27 | Stop reason: SDUPTHER

## 2022-03-15 RX ORDER — ZOLPIDEM TARTRATE 10 MG/1
10 TABLET ORAL NIGHTLY PRN
Qty: 90 TABLET | Refills: 0 | Status: SHIPPED | OUTPATIENT
Start: 2022-03-15 | End: 2022-09-16 | Stop reason: SDUPTHER

## 2022-03-15 RX ORDER — VALSARTAN 40 MG/1
40 TABLET ORAL 2 TIMES DAILY
Qty: 180 TABLET | Refills: 1 | Status: SHIPPED | OUTPATIENT
Start: 2022-03-15 | End: 2022-09-16

## 2022-03-15 NOTE — PROGRESS NOTES
"Chief Complaint  Hypertension (6 month follow up)    Subjective          Janna Rich presents to [facility]  Patient presents to the office today for 6-month follow-up.  I did review labs with the patient including her elevated TSH.  She does have a appointment with endocrinology coming up.  I did review her kidney functions which returned to normal after the seeing her spironolactone.  Patient's blood pressure today is 118/70.  She states that her blood pressure does go up in the evening.  Did discuss increasing her valsartan to 80 mg in the morning and 40 in the evening.  Patient also states that she continues to struggle with her sleep.  She does state that she discontinued the amitriptyline as she was concerned about taking it in conjunction with her Wellbutrin.  She does state that Ambien works well for her and would like a prescription for this.      Objective   Vital Signs:   /70 (BP Location: Right arm, Patient Position: Sitting, Cuff Size: Adult)   Pulse 65   Temp 97.7 °F (36.5 °C) (Temporal)   Ht 152.4 cm (60\")   Wt 64.7 kg (142 lb 9.6 oz)   SpO2 98%   BMI 27.85 kg/m²     Physical Exam  Vitals reviewed.   Constitutional:       Appearance: Normal appearance.   Cardiovascular:      Rate and Rhythm: Normal rate and regular rhythm.      Heart sounds: Normal heart sounds, S1 normal and S2 normal. No murmur heard.  Pulmonary:      Effort: Pulmonary effort is normal. No respiratory distress.      Breath sounds: Normal breath sounds.   Skin:     General: Skin is warm and dry.   Neurological:      Mental Status: She is alert and oriented to person, place, and time.   Psychiatric:         Attention and Perception: Attention normal.         Mood and Affect: Mood normal.         Behavior: Behavior normal.        Result Review :                 Assessment and Plan    Diagnoses and all orders for this visit:    1. Anxiety (Primary)  -     buPROPion XL (WELLBUTRIN XL) 150 " MG 24 hr tablet; Take 1 tablet by mouth Every Morning.  Dispense: 90 tablet; Refill: 1    2. Insomnia, unspecified type  -     zolpidem (AMBIEN) 10 MG tablet; Take 1 tablet by mouth At Night As Needed for Sleep.  Dispense: 90 tablet; Refill: 0  -     POC Urine Drug Screen [facility]    3. Screening for lipid disorders  -     Lipid Panel; Future    4. Primary hypertension  -     valsartan (DIOVAN) 40 MG tablet; Take 1 tablet by mouth 2 (Two) Times a Day for 90 days.  Dispense: 180 tablet; Refill: 1  -     CBC No Differential; Future    5. Hyperthyroidism  Comments:  Follow-up with endocrinology        Follow Up   Return in about 6 months (around 9/15/2022) for Recheck.  Patient was given instructions and counseling regarding her condition or for health maintenance advice. Please see specific information pulled into the AVS if appropriate.

## 2022-03-21 ENCOUNTER — TRANSCRIBE ORDERS (OUTPATIENT)
Dept: ADMINISTRATIVE | Facility: HOSPITAL | Age: 62
End: 2022-03-21

## 2022-03-21 DIAGNOSIS — E05.90 HYPERTHYROIDISM: Primary | ICD-10-CM

## 2022-03-29 ENCOUNTER — LAB (OUTPATIENT)
Dept: LAB | Facility: HOSPITAL | Age: 62
End: 2022-03-29

## 2022-03-29 DIAGNOSIS — Z13.220 SCREENING FOR LIPID DISORDERS: ICD-10-CM

## 2022-03-29 DIAGNOSIS — I10 PRIMARY HYPERTENSION: ICD-10-CM

## 2022-03-29 LAB
CHOLEST SERPL-MCNC: 166 MG/DL (ref 0–200)
DEPRECATED RDW RBC AUTO: 41.1 FL (ref 37–54)
ERYTHROCYTE [DISTWIDTH] IN BLOOD BY AUTOMATED COUNT: 12.7 % (ref 12.3–15.4)
HCT VFR BLD AUTO: 41.3 % (ref 34–46.6)
HDLC SERPL-MCNC: 46 MG/DL (ref 40–60)
HGB BLD-MCNC: 13.9 G/DL (ref 12–15.9)
LDLC SERPL CALC-MCNC: 106 MG/DL (ref 0–100)
LDLC/HDLC SERPL: 2.3 {RATIO}
MCH RBC QN AUTO: 29.9 PG (ref 26.6–33)
MCHC RBC AUTO-ENTMCNC: 33.7 G/DL (ref 31.5–35.7)
MCV RBC AUTO: 88.8 FL (ref 79–97)
PLATELET # BLD AUTO: 277 10*3/MM3 (ref 140–450)
PMV BLD AUTO: 11.8 FL (ref 6–12)
RBC # BLD AUTO: 4.65 10*6/MM3 (ref 3.77–5.28)
TRIGL SERPL-MCNC: 70 MG/DL (ref 0–150)
VLDLC SERPL-MCNC: 14 MG/DL (ref 5–40)
WBC NRBC COR # BLD: 4.5 10*3/MM3 (ref 3.4–10.8)

## 2022-03-29 PROCEDURE — 85027 COMPLETE CBC AUTOMATED: CPT

## 2022-03-29 PROCEDURE — 80061 LIPID PANEL: CPT

## 2022-03-29 PROCEDURE — 36415 COLL VENOUS BLD VENIPUNCTURE: CPT

## 2022-03-30 ENCOUNTER — TELEPHONE (OUTPATIENT)
Dept: FAMILY MEDICINE CLINIC | Facility: CLINIC | Age: 62
End: 2022-03-30

## 2022-04-13 DIAGNOSIS — Z83.2 FAMILY HISTORY OF PROTEIN C DEFICIENCY: Primary | ICD-10-CM

## 2022-04-14 ENCOUNTER — HOSPITAL ENCOUNTER (OUTPATIENT)
Dept: NUCLEAR MEDICINE | Facility: HOSPITAL | Age: 62
Discharge: HOME | End: 2022-04-14
Admitting: INTERNAL MEDICINE

## 2022-04-14 DIAGNOSIS — E05.90 HYPERTHYROIDISM: ICD-10-CM

## 2022-04-14 PROCEDURE — 0 SODIUM IODIDE CAPSULE: Performed by: INTERNAL MEDICINE

## 2022-04-14 PROCEDURE — A9517 I131 IODIDE CAP, RX: HCPCS | Performed by: INTERNAL MEDICINE

## 2022-04-14 PROCEDURE — 79005 NUCLEAR RX ORAL ADMIN: CPT

## 2022-04-14 RX ADMIN — SODIUM IODIDE I 131 1 MILLICURIE: 100 CAPSULE ORAL at 15:26

## 2022-06-17 ENCOUNTER — TRANSCRIBE ORDERS (OUTPATIENT)
Dept: LAB | Facility: HOSPITAL | Age: 62
End: 2022-06-17

## 2022-06-17 ENCOUNTER — LAB (OUTPATIENT)
Dept: LAB | Facility: HOSPITAL | Age: 62
End: 2022-06-17

## 2022-06-17 DIAGNOSIS — Z83.2 FAMILY HISTORY OF PROTEIN C DEFICIENCY: ICD-10-CM

## 2022-06-17 DIAGNOSIS — E05.90 HYPERTHYROIDISM: Primary | ICD-10-CM

## 2022-06-17 DIAGNOSIS — E05.90 HYPERTHYROIDISM: ICD-10-CM

## 2022-06-17 LAB
T3FREE SERPL-MCNC: 1.57 PG/ML (ref 2–4.4)
T4 FREE SERPL-MCNC: 0.47 NG/DL (ref 0.93–1.7)
TSH SERPL DL<=0.05 MIU/L-ACNC: 11.4 UIU/ML (ref 0.27–4.2)

## 2022-06-17 PROCEDURE — 84481 FREE ASSAY (FT-3): CPT

## 2022-06-17 PROCEDURE — 85303 CLOT INHIBIT PROT C ACTIVITY: CPT

## 2022-06-17 PROCEDURE — 84439 ASSAY OF FREE THYROXINE: CPT

## 2022-06-17 PROCEDURE — 36415 COLL VENOUS BLD VENIPUNCTURE: CPT

## 2022-06-17 PROCEDURE — 85302 CLOT INHIBIT PROT C ANTIGEN: CPT

## 2022-06-17 PROCEDURE — 84443 ASSAY THYROID STIM HORMONE: CPT

## 2022-06-22 LAB
PROT C ACT/NOR PPP: 115 % (ref 73–180)
PROT C AG ACT/NOR PPP IA: 104 % (ref 60–150)

## 2022-07-12 ENCOUNTER — OFFICE VISIT (OUTPATIENT)
Dept: ORTHOPEDIC SURGERY | Facility: CLINIC | Age: 62
End: 2022-07-12

## 2022-07-12 VITALS — HEART RATE: 73 BPM | BODY MASS INDEX: 26.86 KG/M2 | HEIGHT: 60 IN | OXYGEN SATURATION: 98 % | WEIGHT: 136.8 LBS

## 2022-07-12 DIAGNOSIS — M79.641 RIGHT HAND PAIN: Primary | ICD-10-CM

## 2022-07-12 DIAGNOSIS — S63.650D SPRAIN OF METACARPOPHALANGEAL (MCP) JOINT OF RIGHT INDEX FINGER, SUBSEQUENT ENCOUNTER: ICD-10-CM

## 2022-07-12 PROBLEM — S63.650A SPRAIN OF METACARPOPHALANGEAL JOINT OF RIGHT INDEX FINGER: Status: ACTIVE | Noted: 2022-07-12

## 2022-07-12 PROCEDURE — 99203 OFFICE O/P NEW LOW 30 MIN: CPT | Performed by: ORTHOPAEDIC SURGERY

## 2022-07-12 PROCEDURE — 20600 DRAIN/INJ JOINT/BURSA W/O US: CPT | Performed by: ORTHOPAEDIC SURGERY

## 2022-07-12 RX ORDER — TRIAMCINOLONE ACETONIDE 40 MG/ML
20 INJECTION, SUSPENSION INTRA-ARTICULAR; INTRAMUSCULAR
Status: COMPLETED | OUTPATIENT
Start: 2022-07-12 | End: 2022-07-12

## 2022-07-12 RX ORDER — LIDOCAINE HYDROCHLORIDE 10 MG/ML
0.5 INJECTION, SOLUTION INFILTRATION; PERINEURAL
Status: COMPLETED | OUTPATIENT
Start: 2022-07-12 | End: 2022-07-12

## 2022-07-12 RX ADMIN — TRIAMCINOLONE ACETONIDE 20 MG: 40 INJECTION, SUSPENSION INTRA-ARTICULAR; INTRAMUSCULAR at 11:04

## 2022-07-12 RX ADMIN — LIDOCAINE HYDROCHLORIDE 0.5 ML: 10 INJECTION, SOLUTION INFILTRATION; PERINEURAL at 11:04

## 2022-07-12 NOTE — PROGRESS NOTES
"Chief Complaint  Pain and Initial Evaluation of the Right Hand     Subjective      Janna Rich presents to [facility] for evaluation of the right hand. The patient injured her right index finger on 6/13/22. She reports she had swelling and bruising. She reports she flared it up when she hit it on the shampoo bottle recently. She has no other complaints.     Allergies   Allergen Reactions   • Codeine Itching   • Morphine Swelling   • Ciprofloxacin Rash        Social History     Socioeconomic History   • Marital status:    Tobacco Use   • Smoking status: Never Smoker   • Smokeless tobacco: Never Used   Vaping Use   • Vaping Use: Never used   Substance and Sexual Activity   • Alcohol use: Yes     Comment: light 04/24/2018 - 1-2 beers month   • Drug use: Never     Comment: denies substance abuse   • Sexual activity: Yes     Partners: Male        Review of Systems     Objective   Vital Signs:   Pulse 73   Ht 152.4 cm (60\")   Wt 62.1 kg (136 lb 12.8 oz)   SpO2 98%   BMI 26.72 kg/m²       Physical Exam  Constitutional:       Appearance: Normal appearance. The patient is well-developed and normal weight.   HENT:      Head: Normocephalic.      Right Ear: Hearing and external ear normal.      Left Ear: Hearing and external ear normal.      Nose: Nose normal.   Eyes:      Conjunctiva/sclera: Conjunctivae normal.   Cardiovascular:      Rate and Rhythm: Normal rate.   Pulmonary:      Effort: Pulmonary effort is normal.      Breath sounds: No wheezing or rales.   Abdominal:      Palpations: Abdomen is soft.      Tenderness: There is no abdominal tenderness.   Musculoskeletal:      Cervical back: Normal range of motion.   Skin:     Findings: No rash.   Neurological:      Mental Status: The patient is alert and oriented to person, place, and time.   Psychiatric:         Mood and Affect: Mood and affect normal.         Judgment: Judgment normal.       Ortho Exam      Right hand- tender " over the right index MCP joint. MCP joint is Stable to varus/valgus stress. Full PIP and DIP joint ROM. MCP flexion 85. Extension 15. Sensation to light touch median, radial, ulnar nerve. Positive AIN, PIN, ulnar nerve. Positive pulses.     Right index finger   Consent given by: patient  Site marked: site marked  Timeout: Immediately prior to procedure a time out was called to verify the correct patient, procedure, equipment, support staff and site/side marked as required   Supporting Documentation  Indications: pain   Procedure Details  Location: index finger -   Preparation: Patient was prepped and draped in the usual sterile fashion  Needle gauge: 23G.  Medications administered: 0.5 mL lidocaine 1 %; 20 mg triamcinolone acetonide 40 MG/ML  Patient tolerance: patient tolerated the procedure well with no immediate complications          X-Ray Report:  Right index finger X-Ray  Indication: Evaluation of Right index finger pain  AP and Lateral view(s)  Findings: no acute fracture  Prior studies available for comparison: no         Imaging Results (Most Recent)     Procedure Component Value Units Date/Time    XR Finger 2+ View Right [[id number]] Resulted: 07/12/22 1040     Updated: 07/12/22 1040           Result Review :       No results found.           Assessment and Plan     Diagnoses and all orders for this visit:    1. Right hand pain (Primary)  -     XR Finger 2+ View Right    2. Sprain of metacarpophalangeal (MCP) joint of right index finger, subsequent encounter        Discussed the treatment plan with the patient.  I reviewed the x-rays that were obtained today with the patient. No fracture is seen on x-rays. I advised her to use voltaren gel for the finger. Discussed the risks and benefits of a right finger injection. The patient expressed understanding and wished to proceed. She tolerated the injection well.     Call or return if worsening symptoms.    Follow Up     4 weeks      Patient was given  instructions and counseling regarding her condition or for health maintenance advice. Please see specific information pulled into the AVS if appropriate.     Scribed for Selwyn Prather MD by Vida Adhikari.  07/12/22   10:56 EDT    I have personally performed the services described in this document as scribed by the above individual and it is both accurate and complete. Selwyn Prather MD 07/12/22

## 2022-08-15 ENCOUNTER — TRANSCRIBE ORDERS (OUTPATIENT)
Dept: LAB | Facility: HOSPITAL | Age: 62
End: 2022-08-15

## 2022-08-15 ENCOUNTER — LAB (OUTPATIENT)
Dept: LAB | Facility: HOSPITAL | Age: 62
End: 2022-08-15

## 2022-08-15 DIAGNOSIS — E03.9 ADULT HYPOTHYROIDISM: ICD-10-CM

## 2022-08-15 DIAGNOSIS — E03.9 ADULT HYPOTHYROIDISM: Primary | ICD-10-CM

## 2022-08-15 LAB
T3FREE SERPL-MCNC: 1.87 PG/ML (ref 2–4.4)
T4 FREE SERPL-MCNC: 0.96 NG/DL (ref 0.93–1.7)
TSH SERPL DL<=0.05 MIU/L-ACNC: 41.9 UIU/ML (ref 0.27–4.2)

## 2022-08-15 PROCEDURE — 36415 COLL VENOUS BLD VENIPUNCTURE: CPT

## 2022-08-15 PROCEDURE — 84481 FREE ASSAY (FT-3): CPT

## 2022-08-15 PROCEDURE — 84443 ASSAY THYROID STIM HORMONE: CPT

## 2022-08-15 PROCEDURE — 84439 ASSAY OF FREE THYROXINE: CPT

## 2022-08-19 ENCOUNTER — OFFICE VISIT (OUTPATIENT)
Dept: FAMILY MEDICINE CLINIC | Facility: CLINIC | Age: 62
End: 2022-08-19

## 2022-08-19 VITALS
SYSTOLIC BLOOD PRESSURE: 125 MMHG | BODY MASS INDEX: 27.27 KG/M2 | HEIGHT: 60 IN | OXYGEN SATURATION: 100 % | RESPIRATION RATE: 17 BRPM | DIASTOLIC BLOOD PRESSURE: 80 MMHG | WEIGHT: 138.9 LBS | TEMPERATURE: 98.5 F | HEART RATE: 56 BPM

## 2022-08-19 DIAGNOSIS — Z92.3 S/P RADIOACTIVE IODINE THYROID ABLATION: ICD-10-CM

## 2022-08-19 DIAGNOSIS — E03.9 ACQUIRED HYPOTHYROIDISM: Primary | ICD-10-CM

## 2022-08-19 PROCEDURE — 99213 OFFICE O/P EST LOW 20 MIN: CPT | Performed by: STUDENT IN AN ORGANIZED HEALTH CARE EDUCATION/TRAINING PROGRAM

## 2022-08-19 RX ORDER — LEVOTHYROXINE SODIUM 112 UG/1
112 TABLET ORAL DAILY
Qty: 90 TABLET | Refills: 0 | Status: SHIPPED | OUTPATIENT
Start: 2022-08-19 | End: 2022-09-14

## 2022-08-19 RX ORDER — ZOLPIDEM TARTRATE 10 MG/1
10 TABLET ORAL NIGHTLY PRN
Qty: 90 TABLET | Refills: 0 | Status: CANCELLED | OUTPATIENT
Start: 2022-08-19

## 2022-08-19 NOTE — PROGRESS NOTES
"Chief Complaint  Following up on abnormal thyroid    Subjective         Janna Rich is a 62 y.o. female who presents to [facility]    62 years old female comes to the clinic today for an acute visit.    Patient has history of hypothyroidism, treated with radioactive ablation.  Since then patient is requiring levothyroxine.    Patient is compliant with levothyroxine, last blood work at endocrinology and she was told to follow-up with  Regarding that abnormal blood work.    Patient does not report any hypothyroid symptoms at this time.  Patient is currently taking levothyroxine 88 MCG.  Objective   Vital Signs:   Vitals:    08/19/22 0733   BP: 125/80   Pulse: 56   Resp: 17   Temp: 98.5 °F (36.9 °C)   SpO2: 100%   Weight: 63 kg (138 lb 14.4 oz)   Height: 152.4 cm (60\")      Body mass index is 27.13 kg/m².   Wt Readings from Last 3 Encounters:   08/19/22 63 kg (138 lb 14.4 oz)   07/12/22 62.1 kg (136 lb 12.8 oz)   03/15/22 64.7 kg (142 lb 9.6 oz)      BP Readings from Last 3 Encounters:   08/19/22 125/80   03/15/22 118/70   09/13/21 142/80        Patient Care Team:  Shan De La Garza APRN as PCP - General (Nurse Practitioner)     Physical Exam  Constitutional:       General: She is awake.   Neurological:      Mental Status: She is alert.   Psychiatric:         Behavior: Behavior is cooperative.                       Assessment and Plan   Diagnoses and all orders for this visit:    1. Acquired hypothyroidism (Primary)  -     levothyroxine (Synthroid) 112 MCG tablet; Take 1 tablet by mouth Daily.  Dispense: 90 tablet; Refill: 0  -     TSH Rfx On Abnormal To Free T4; Future    2. S/P radioactive iodine thyroid ablation      After reviewing patient's history and current blood work; I will go ahead and increase levothyroxine to 112 MCG.  Patient to repeat blood work after 6 weeks.    Patient already has appointment with PCP next month, I would recommend also thyroid ultrasound at some " point specially due to history of radioactive ablation.    Titrate medication as needed after next blood work.    Tobacco Use: Low Risk    • Smoking Tobacco Use: Never Smoker   • Smokeless Tobacco Use: Never Used            Follow Up   Return if symptoms worsen or fail to improve.  Patient was given instructions and counseling regarding her condition or for health maintenance advice. Please see specific information pulled into the AVS if appropriate.

## 2022-09-06 ENCOUNTER — CLINICAL SUPPORT (OUTPATIENT)
Dept: FAMILY MEDICINE CLINIC | Facility: CLINIC | Age: 62
End: 2022-09-06

## 2022-09-06 DIAGNOSIS — M54.40 BILATERAL LOW BACK PAIN WITH SCIATICA, SCIATICA LATERALITY UNSPECIFIED, UNSPECIFIED CHRONICITY: Primary | ICD-10-CM

## 2022-09-06 DIAGNOSIS — N30.90 CYSTITIS: ICD-10-CM

## 2022-09-06 LAB
BILIRUB BLD-MCNC: NEGATIVE MG/DL
CLARITY, POC: CLEAR
COLOR UR: YELLOW
EXPIRATION DATE: ABNORMAL
GLUCOSE UR STRIP-MCNC: NEGATIVE MG/DL
KETONES UR QL: NEGATIVE
LEUKOCYTE EST, POC: ABNORMAL
Lab: ABNORMAL
NITRITE UR-MCNC: NEGATIVE MG/ML
PH UR: 6.5 [PH] (ref 5–8)
PROT UR STRIP-MCNC: NEGATIVE MG/DL
RBC # UR STRIP: ABNORMAL /UL
SP GR UR: 1.01 (ref 1–1.03)
UROBILINOGEN UR QL: ABNORMAL

## 2022-09-06 PROCEDURE — 87088 URINE BACTERIA CULTURE: CPT | Performed by: NURSE PRACTITIONER

## 2022-09-06 PROCEDURE — 87086 URINE CULTURE/COLONY COUNT: CPT | Performed by: NURSE PRACTITIONER

## 2022-09-06 PROCEDURE — 99211 OFF/OP EST MAY X REQ PHY/QHP: CPT | Performed by: NURSE PRACTITIONER

## 2022-09-06 PROCEDURE — 81003 URINALYSIS AUTO W/O SCOPE: CPT | Performed by: NURSE PRACTITIONER

## 2022-09-06 PROCEDURE — 87186 SC STD MICRODIL/AGAR DIL: CPT | Performed by: NURSE PRACTITIONER

## 2022-09-06 RX ORDER — SULFAMETHOXAZOLE AND TRIMETHOPRIM 800; 160 MG/1; MG/1
1 TABLET ORAL 2 TIMES DAILY
Qty: 14 TABLET | Refills: 0 | Status: SHIPPED | OUTPATIENT
Start: 2022-09-06 | End: 2022-09-13

## 2022-09-08 LAB — BACTERIA SPEC AEROBE CULT: ABNORMAL

## 2022-09-13 ENCOUNTER — LAB (OUTPATIENT)
Dept: LAB | Facility: HOSPITAL | Age: 62
End: 2022-09-13

## 2022-09-13 DIAGNOSIS — E03.9 ACQUIRED HYPOTHYROIDISM: ICD-10-CM

## 2022-09-13 LAB — TSH SERPL DL<=0.05 MIU/L-ACNC: 16.4 UIU/ML (ref 0.27–4.2)

## 2022-09-13 PROCEDURE — 84443 ASSAY THYROID STIM HORMONE: CPT

## 2022-09-13 PROCEDURE — 84439 ASSAY OF FREE THYROXINE: CPT

## 2022-09-14 DIAGNOSIS — E05.90 HYPERTHYROIDISM: Primary | ICD-10-CM

## 2022-09-14 DIAGNOSIS — E03.9 ACQUIRED HYPOTHYROIDISM: ICD-10-CM

## 2022-09-14 LAB — T4 FREE SERPL-MCNC: 1.37 NG/DL (ref 0.93–1.7)

## 2022-09-14 RX ORDER — LEVOTHYROXINE SODIUM 137 UG/1
137 TABLET ORAL DAILY
Qty: 90 TABLET | Refills: 0 | Status: SHIPPED | OUTPATIENT
Start: 2022-09-14 | End: 2022-12-15 | Stop reason: SDUPTHER

## 2022-09-16 ENCOUNTER — OFFICE VISIT (OUTPATIENT)
Dept: FAMILY MEDICINE CLINIC | Facility: CLINIC | Age: 62
End: 2022-09-16

## 2022-09-16 VITALS
HEIGHT: 60 IN | DIASTOLIC BLOOD PRESSURE: 92 MMHG | HEART RATE: 63 BPM | BODY MASS INDEX: 27.37 KG/M2 | OXYGEN SATURATION: 98 % | WEIGHT: 139.4 LBS | TEMPERATURE: 97.6 F | SYSTOLIC BLOOD PRESSURE: 144 MMHG

## 2022-09-16 DIAGNOSIS — I10 PRIMARY HYPERTENSION: ICD-10-CM

## 2022-09-16 DIAGNOSIS — R23.2 HOT FLASHES: ICD-10-CM

## 2022-09-16 DIAGNOSIS — R32 URINARY INCONTINENCE, UNSPECIFIED TYPE: ICD-10-CM

## 2022-09-16 DIAGNOSIS — K58.0 IRRITABLE BOWEL SYNDROME WITH DIARRHEA: ICD-10-CM

## 2022-09-16 DIAGNOSIS — Z12.31 ENCOUNTER FOR SCREENING MAMMOGRAM FOR MALIGNANT NEOPLASM OF BREAST: ICD-10-CM

## 2022-09-16 DIAGNOSIS — G47.00 INSOMNIA, UNSPECIFIED TYPE: ICD-10-CM

## 2022-09-16 DIAGNOSIS — N39.0 FREQUENT UTI: ICD-10-CM

## 2022-09-16 DIAGNOSIS — E03.9 HYPOTHYROIDISM, UNSPECIFIED TYPE: Primary | ICD-10-CM

## 2022-09-16 PROCEDURE — 99214 OFFICE O/P EST MOD 30 MIN: CPT | Performed by: NURSE PRACTITIONER

## 2022-09-16 RX ORDER — VALSARTAN 80 MG/1
80 TABLET ORAL 2 TIMES DAILY
Qty: 180 TABLET | Refills: 1 | Status: SHIPPED | OUTPATIENT
Start: 2022-09-16 | End: 2023-03-10 | Stop reason: SDUPTHER

## 2022-09-16 RX ORDER — CLONIDINE HYDROCHLORIDE 0.1 MG/1
0.1 TABLET ORAL DAILY
Qty: 90 TABLET | Refills: 1 | Status: SHIPPED | OUTPATIENT
Start: 2022-09-16 | End: 2023-03-10 | Stop reason: ALTCHOICE

## 2022-09-16 RX ORDER — DICYCLOMINE HCL 20 MG
20 TABLET ORAL 2 TIMES DAILY PRN
Qty: 180 TABLET | Refills: 0 | Status: SHIPPED | OUTPATIENT
Start: 2022-09-16

## 2022-09-16 RX ORDER — ZOLPIDEM TARTRATE 10 MG/1
10 TABLET ORAL NIGHTLY PRN
Qty: 90 TABLET | Refills: 1 | Status: SHIPPED | OUTPATIENT
Start: 2022-09-16 | End: 2023-03-10 | Stop reason: ALTCHOICE

## 2022-09-16 NOTE — PROGRESS NOTES
"Chief Complaint  Hypertension (6 mo f/u )    Subjective         Janna Rich presents to [facility]  Presents to the office today for 6-month follow-up guarding her hypertension.  I did go over recent labs with the patient including her elevated TSH.  Patient did see Dr. Calvin earlier and he had increased her dosage to 137 mcg.  I explained that we would recheck that in the next 3 months.  She does state that she is getting UTIs much more frequently.  She states that she would like a referral to urogynecologist.  Also states that she needs a refill on her Ambien.  Patient states that her blood pressure has been running high at home.  She states that 3 weeks ago it is 180/110.  She denies any chest pain shortness breath palpitations this time.  I did discuss increasing her blood pressure medication.  I also discussed incorporating clonidine in her regimen to help with also with the hot flashes.  She had been on Premarin in the past without any improvement of the symptoms.  She is also been on SSRIs in the past as well.  Pressure arrival today is 144/92.       Objective     Vitals:    09/16/22 0734   BP: 144/92   BP Location: Right arm   Patient Position: Sitting   Cuff Size: Adult   Pulse: 63   Temp: 97.6 °F (36.4 °C)   TempSrc: Temporal   SpO2: 98%   Weight: 63.2 kg (139 lb 6.4 oz)   Height: 152.4 cm (60\")      Body mass index is 27.22 kg/m².    BMI is >= 25 and <30. (Overweight) The following options were offered after discussion;: nutrition counseling/recommendations        Physical Exam  Vitals reviewed.   Constitutional:       Appearance: Normal appearance.   Cardiovascular:      Rate and Rhythm: Normal rate and regular rhythm.      Pulses: Normal pulses.      Heart sounds: Normal heart sounds, S1 normal and S2 normal. No murmur heard.  Pulmonary:      Effort: Pulmonary effort is normal. No respiratory distress.      Breath sounds: Normal breath sounds.   Skin:     General: " Skin is warm and dry.   Neurological:      Mental Status: She is alert and oriented to person, place, and time.   Psychiatric:         Attention and Perception: Attention normal.         Mood and Affect: Mood normal.         Behavior: Behavior normal.          Result Review :   The following data was reviewed by: BELLE Worthy on 09/16/2022:      Procedures    Assessment and Plan   Diagnoses and all orders for this visit:    1. Hypothyroidism, unspecified type (Primary)  -     TSH; Future    2. Primary hypertension  -     valsartan (DIOVAN) 80 MG tablet; Take 1 tablet by mouth 2 (Two) Times a Day for 90 days.  Dispense: 180 tablet; Refill: 1    3. Encounter for screening mammogram for malignant neoplasm of breast  -     Mammo Screening Digital Tomosynthesis Bilateral With CAD; Future    4. Irritable bowel syndrome with diarrhea  -     dicyclomine (BENTYL) 20 MG tablet; Take 1 tablet by mouth 2 (Two) Times a Day As Needed (cramping/diarrhea).  Dispense: 180 tablet; Refill: 0    5. Insomnia, unspecified type  -     zolpidem (AMBIEN) 10 MG tablet; Take 1 tablet by mouth At Night As Needed for Sleep.  Dispense: 90 tablet; Refill: 1    6. Hot flashes  -     cloNIDine (Catapres) 0.1 MG tablet; Take 1 tablet by mouth Daily.  Dispense: 90 tablet; Refill: 1    7. Frequent UTI  -     Ambulatory Referral to Gynecologic Urology    8. Urinary incontinence, unspecified type  -     Ambulatory Referral to Gynecologic Urology          Follow Up   Return in about 6 months (around 3/16/2023) for Recheck.  Patient was given instructions and counseling regarding her condition or for health maintenance advice. Please see specific information pulled into the AVS if appropriate.

## 2022-10-17 ENCOUNTER — TELEPHONE (OUTPATIENT)
Dept: FAMILY MEDICINE CLINIC | Facility: CLINIC | Age: 62
End: 2022-10-17

## 2022-10-17 NOTE — TELEPHONE ENCOUNTER
Patient was calling about her referral to a foot doctor. She is requesting to see Dr Yeboah at [facility]. Reason: she is not to fond of the Dr she is seeing right now.

## 2022-10-18 ENCOUNTER — OFFICE VISIT (OUTPATIENT)
Dept: ORTHOPEDIC SURGERY | Facility: CLINIC | Age: 62
End: 2022-10-18

## 2022-10-18 VITALS — HEIGHT: 60 IN | WEIGHT: 143 LBS | HEART RATE: 76 BPM | OXYGEN SATURATION: 98 % | BODY MASS INDEX: 28.07 KG/M2

## 2022-10-18 DIAGNOSIS — S69.91XA FINGER INJURY, RIGHT, INITIAL ENCOUNTER: ICD-10-CM

## 2022-10-18 DIAGNOSIS — M25.551 RIGHT HIP PAIN: ICD-10-CM

## 2022-10-18 DIAGNOSIS — M79.641 RIGHT HAND PAIN: Primary | ICD-10-CM

## 2022-10-18 PROCEDURE — 99213 OFFICE O/P EST LOW 20 MIN: CPT | Performed by: ORTHOPAEDIC SURGERY

## 2022-10-18 PROCEDURE — 20600 DRAIN/INJ JOINT/BURSA W/O US: CPT | Performed by: ORTHOPAEDIC SURGERY

## 2022-10-18 PROCEDURE — 20610 DRAIN/INJ JOINT/BURSA W/O US: CPT | Performed by: ORTHOPAEDIC SURGERY

## 2022-10-18 RX ORDER — TRIAMCINOLONE ACETONIDE 40 MG/ML
40 INJECTION, SUSPENSION INTRA-ARTICULAR; INTRAMUSCULAR
Status: COMPLETED | OUTPATIENT
Start: 2022-10-18 | End: 2022-10-18

## 2022-10-18 RX ORDER — LIDOCAINE HYDROCHLORIDE 10 MG/ML
5 INJECTION, SOLUTION INFILTRATION; PERINEURAL
Status: COMPLETED | OUTPATIENT
Start: 2022-10-18 | End: 2022-10-18

## 2022-10-18 RX ORDER — LIDOCAINE HYDROCHLORIDE 10 MG/ML
1 INJECTION, SOLUTION INFILTRATION; PERINEURAL
Status: COMPLETED | OUTPATIENT
Start: 2022-10-18 | End: 2022-10-18

## 2022-10-18 RX ADMIN — TRIAMCINOLONE ACETONIDE 40 MG: 40 INJECTION, SUSPENSION INTRA-ARTICULAR; INTRAMUSCULAR at 08:44

## 2022-10-18 RX ADMIN — LIDOCAINE HYDROCHLORIDE 1 ML: 10 INJECTION, SOLUTION INFILTRATION; PERINEURAL at 08:44

## 2022-10-18 RX ADMIN — LIDOCAINE HYDROCHLORIDE 5 ML: 10 INJECTION, SOLUTION INFILTRATION; PERINEURAL at 08:44

## 2022-10-18 NOTE — PROGRESS NOTES
"Chief Complaint  Pain and Follow-up of the Right Hand and Pain and Initial Evaluation of the Right Hip     Subjective      Janna Rich presents to [facility] for follow up evaluation of the right hand and evaluation of the right hip. The patient reports she hit her index finger while cleaning. She was previously treated for a sprain in her index finger. She has previously had a hip injection for bursitis that gave her relief.     Allergies   Allergen Reactions   • Codeine Itching   • Morphine Swelling   • Ciprofloxacin Rash        Social History     Socioeconomic History   • Marital status:    Tobacco Use   • Smoking status: Never   • Smokeless tobacco: Never   Vaping Use   • Vaping Use: Never used   Substance and Sexual Activity   • Alcohol use: Yes     Comment: light 04/24/2018 - 1-2 beers month   • Drug use: Never     Comment: denies substance abuse   • Sexual activity: Yes     Partners: Male        Review of Systems     Objective   Vital Signs:   Pulse 76   Ht 152.4 cm (60\")   Wt 64.9 kg (143 lb)   SpO2 98%   BMI 27.93 kg/m²       Physical Exam  Constitutional:       Appearance: Normal appearance. The patient is well-developed and normal weight.   HENT:      Head: Normocephalic.      Right Ear: Hearing and external ear normal.      Left Ear: Hearing and external ear normal.      Nose: Nose normal.   Eyes:      Conjunctiva/sclera: Conjunctivae normal.   Cardiovascular:      Rate and Rhythm: Normal rate.   Pulmonary:      Effort: Pulmonary effort is normal.      Breath sounds: No wheezing or rales.   Abdominal:      Palpations: Abdomen is soft.      Tenderness: There is no abdominal tenderness.   Musculoskeletal:      Cervical back: Normal range of motion.   Skin:     Findings: No rash.   Neurological:      Mental Status: The patient is alert and oriented to person, place, and time.   Psychiatric:         Mood and Affect: Mood and affect normal.         Judgment: " Judgment normal.       Ortho Exam      Right hand- tender to MCP of index. Full extension, reduced MCP flexion to 80 degrees. No skin discoloration, atrophy or swelling. Sensation to light touch median, radial, ulnar nerve. Positive AIN, PIN, ulnar nerve. Intact motor function. Positive pulses. Good capillary refill.     Right hip- tender to the lateral hip over the greater trochanter. Positive EHL, FHL, GS and TA. Sensation intact to all 5 nerves of the foot. Positive pulses. Neurovascularly intact. No pain with hip ROM. Flexion 100. External Rotation 45. Internal rotation 35. Negative straight leg raise     Large Joint Arthrocentesis  Date/Time: 10/18/2022 8:44 AM  Consent given by: patient  Site marked: site marked  Timeout: Immediately prior to procedure a time out was called to verify the correct patient, procedure, equipment, support staff and site/side marked as required   Supporting Documentation  Indications: pain   Procedure Details  Location: hip - Hip joint: right.  Needle size: 22 G  Medications administered: 5 mL lidocaine 1 %; 40 mg triamcinolone acetonide 40 MG/ML  Patient tolerance: patient tolerated the procedure well with no immediate complications    Small Joint Arthrocentesis  Consent given by: patient  Site marked: site marked  Timeout: Immediately prior to procedure a time out was called to verify the correct patient, procedure, equipment, support staff and site/side marked as required   Procedure Details  Location: index finger - Index finger joint: right.  Preparation: Patient was prepped and draped in the usual sterile fashion  Needle gauge: 23g.  Medications administered: 40 mg triamcinolone acetonide 40 MG/ML; 1 mL lidocaine 1 %  Patient tolerance: patient tolerated the procedure well with no immediate complications          X-Ray Report:  Right hand(s) X-Ray  Indication: Evaluation of right hand pain  AP and Lateral view(s)  Findings: 1.5x.5mm calcification to the dorsum of the proximal  phalanx at the MCP joint.   Prior studies available for comparison: no     X-Ray Report:  Right hip(s) X-Ray  Indication: Evaluation of right hip pain  AP and Lateral view(s)  Findings: no acute fracture. Enthesophyte to the greater trochanter. Lumbar degenerative changes.   Prior studies available for comparison: no         Imaging Results (Most Recent)     Procedure Component Value Units Date/Time    XR Finger 2+ View Right [[id number]] Resulted: 10/18/22 0825     Updated: 10/18/22 0826    Narrative:      X-Ray Report:  Right hand(s) X-Ray  Indication: Evaluation of right hand pain  AP and Lateral view(s)  Findings: 1.5x.5mm calcification to the dorsum of the proximal phalanx at   the MCP joint.   Prior studies available for comparison: no     XR Hip With or Without Pelvis 2 - 3 View Right [[id number]] Resulted: 10/18/22 0826     Updated: 10/18/22 0818           Result Review :       XR Finger 2+ View Right    Result Date: 10/18/2022  Narrative: X-Ray Report: Right hand(s) X-Ray Indication: Evaluation of right hand pain AP and Lateral view(s) Findings: 1.5x.5mm calcification to the dorsum of the proximal phalanx at the MCP joint. Prior studies available for comparison: no              Assessment and Plan     Diagnoses and all orders for this visit:    1. Right hand pain (Primary)  -     XR Finger 2+ View Right    2. Right hip pain  -     XR Hip With or Without Pelvis 2 - 3 View Right    3. Right Index Finger injury, right, initial encounter        Discussed the treatment plan with the patient.  I reviewed the x-rays that were obtained today with the patient. Discussed the risks and benefits of a right index finger and right hip bursa injection. The patient expressed understanding and wished to proceed. She tolerated the injections well.     Call or return if worsening symptoms.    Follow Up     PRN      Patient was given instructions and counseling regarding her condition or for health maintenance advice. Please see  specific information pulled into the AVS if appropriate.     Scribed for Selwyn Prather MD by Vida Adhikari.  10/18/22   08:09 EDT    I have personally performed the services described in this document as scribed by the above individual and it is both accurate and complete. Selwyn Prather MD 10/18/22

## 2022-11-08 DIAGNOSIS — E03.9 HYPOTHYROIDISM, UNSPECIFIED TYPE: Primary | ICD-10-CM

## 2022-11-08 RX ORDER — NITROFURANTOIN MACROCRYSTALS 50 MG/1
50 CAPSULE ORAL 4 TIMES DAILY
Qty: 30 CAPSULE | Refills: 1 | Status: SHIPPED | OUTPATIENT
Start: 2022-11-08

## 2022-11-08 RX ORDER — HYDROCHLOROTHIAZIDE 25 MG/1
25 TABLET ORAL DAILY
Qty: 90 TABLET | Refills: 1 | Status: SHIPPED | OUTPATIENT
Start: 2022-11-08

## 2022-11-15 ENCOUNTER — TELEPHONE (OUTPATIENT)
Dept: FAMILY MEDICINE CLINIC | Facility: CLINIC | Age: 62
End: 2022-11-15

## 2022-11-15 NOTE — TELEPHONE ENCOUNTER
Caller: Janna Rich    Relationship to patient: Self    Best call back number: [phone]  Patient is needing: PATIENT IS WANTING TO LET [facility] KNOW SHE WAS SEEN IN [facility] THE OTHER DAY REGARDING PELVIC FLOOR PROBLEMS, THEY DID A CULTURE AND NOTHING SHOWED UP BUT PATIENT SAID SHE IS STILL HAVING DISCOMFORT AND WANTED TO UPDATE.

## 2022-11-15 NOTE — TELEPHONE ENCOUNTER
Caller: Janna Rich    Relationship: Self    Best call back number: [phone]  Requested Prescriptions:   Requested Prescriptions     Pending Prescriptions Disp Refills   • metoprolol tartrate (LOPRESSOR) 25 MG tablet 180 tablet 1     Sig: Take 1 tablet by mouth 2 (Two) Times a Day.        Pharmacy where request should be sent: [facility] [place]     Does the patient have less than a 3 day supply:  [x] Yes  [] No    Jasson George Rep   11/15/22 10:19 EST

## 2022-12-13 ENCOUNTER — LAB (OUTPATIENT)
Dept: LAB | Facility: HOSPITAL | Age: 62
End: 2022-12-13

## 2022-12-13 DIAGNOSIS — E03.9 HYPOTHYROIDISM, UNSPECIFIED TYPE: ICD-10-CM

## 2022-12-13 LAB — TSH SERPL DL<=0.05 MIU/L-ACNC: 0.46 UIU/ML (ref 0.27–4.2)

## 2022-12-13 PROCEDURE — 84443 ASSAY THYROID STIM HORMONE: CPT

## 2022-12-13 PROCEDURE — 36415 COLL VENOUS BLD VENIPUNCTURE: CPT

## 2022-12-15 DIAGNOSIS — E03.9 ACQUIRED HYPOTHYROIDISM: ICD-10-CM

## 2022-12-15 RX ORDER — LEVOTHYROXINE SODIUM 137 UG/1
137 TABLET ORAL DAILY
Qty: 90 TABLET | Refills: 0 | Status: SHIPPED | OUTPATIENT
Start: 2022-12-15 | End: 2022-12-16 | Stop reason: SDUPTHER

## 2022-12-16 DIAGNOSIS — E03.9 ACQUIRED HYPOTHYROIDISM: ICD-10-CM

## 2022-12-16 RX ORDER — LEVOTHYROXINE SODIUM 137 UG/1
137 TABLET ORAL DAILY
Qty: 90 TABLET | Refills: 0 | Status: SHIPPED | OUTPATIENT
Start: 2022-12-16 | End: 2023-03-14 | Stop reason: SDUPTHER

## 2022-12-16 NOTE — TELEPHONE ENCOUNTER
Caller: Janna Rich    Relationship: Self    Best call back number: [phone]    Requested Prescriptions:   Requested Prescriptions     Pending Prescriptions Disp Refills   • levothyroxine (SYNTHROID, LEVOTHROID) 137 MCG tablet 90 tablet 0     Sig: Take 1 tablet by mouth Daily.        Pharmacy where request should be sent: [facility]     Additional details provided by patient: PATIENT WOULD LIKE TO SPEAK TO A NURSE ABOUT HER RECENT THYROID TEST TO SEE IF HER DOSAGE NEEDED TO BE INCREASED OR DECREASED.    Does the patient have less than a 3 day supply:  [x] Yes  [] No    Would you like a call back once the refill request has been completed: [x] Yes [] No    If the office needs to give you a call back, can they leave a voicemail: [x] Yes [] No    Jasson Mao Rep   12/16/22 11:30 EST

## 2023-01-04 ENCOUNTER — HOSPITAL ENCOUNTER (OUTPATIENT)
Dept: MAMMOGRAPHY | Facility: HOSPITAL | Age: 63
Discharge: HOME | End: 2023-01-04
Admitting: NURSE PRACTITIONER
Payer: COMMERCIAL

## 2023-01-04 DIAGNOSIS — Z12.31 ENCOUNTER FOR SCREENING MAMMOGRAM FOR MALIGNANT NEOPLASM OF BREAST: ICD-10-CM

## 2023-01-04 PROCEDURE — 77067 SCR MAMMO BI INCL CAD: CPT

## 2023-01-04 PROCEDURE — 77063 BREAST TOMOSYNTHESIS BI: CPT

## 2023-02-27 DIAGNOSIS — F41.9 ANXIETY: ICD-10-CM

## 2023-02-27 RX ORDER — BUPROPION HYDROCHLORIDE 150 MG/1
150 TABLET ORAL EVERY MORNING
Qty: 90 TABLET | Refills: 1 | Status: SHIPPED | OUTPATIENT
Start: 2023-02-27

## 2023-03-10 ENCOUNTER — LAB (OUTPATIENT)
Dept: LAB | Facility: HOSPITAL | Age: 63
End: 2023-03-10
Payer: COMMERCIAL

## 2023-03-10 ENCOUNTER — OFFICE VISIT (OUTPATIENT)
Dept: FAMILY MEDICINE CLINIC | Facility: CLINIC | Age: 63
End: 2023-03-10
Payer: COMMERCIAL

## 2023-03-10 VITALS
SYSTOLIC BLOOD PRESSURE: 112 MMHG | HEART RATE: 67 BPM | HEIGHT: 60 IN | BODY MASS INDEX: 26.74 KG/M2 | WEIGHT: 136.2 LBS | TEMPERATURE: 97.3 F | DIASTOLIC BLOOD PRESSURE: 80 MMHG | OXYGEN SATURATION: 99 %

## 2023-03-10 DIAGNOSIS — G47.00 INSOMNIA, UNSPECIFIED TYPE: ICD-10-CM

## 2023-03-10 DIAGNOSIS — Z13.220 SCREENING FOR LIPID DISORDERS: ICD-10-CM

## 2023-03-10 DIAGNOSIS — I10 PRIMARY HYPERTENSION: ICD-10-CM

## 2023-03-10 DIAGNOSIS — F33.0 MAJOR DEPRESSIVE DISORDER, RECURRENT, MILD: ICD-10-CM

## 2023-03-10 DIAGNOSIS — E03.9 HYPOTHYROIDISM, UNSPECIFIED TYPE: ICD-10-CM

## 2023-03-10 DIAGNOSIS — R53.83 FATIGUE, UNSPECIFIED TYPE: ICD-10-CM

## 2023-03-10 DIAGNOSIS — F41.9 ANXIETY: ICD-10-CM

## 2023-03-10 DIAGNOSIS — Z79.899 MEDICATION MANAGEMENT: Primary | ICD-10-CM

## 2023-03-10 LAB
25(OH)D3 SERPL-MCNC: 22.4 NG/ML (ref 30–100)
ALBUMIN SERPL-MCNC: 4.5 G/DL (ref 3.5–5.2)
ALBUMIN/GLOB SERPL: 1.5 G/DL
ALP SERPL-CCNC: 89 U/L (ref 39–117)
ALT SERPL W P-5'-P-CCNC: 18 U/L (ref 1–33)
AMPHET+METHAMPHET UR QL: NEGATIVE
AMPHETAMINE INTERNAL CONTROL: NORMAL
AMPHETAMINES UR QL: NEGATIVE
ANION GAP SERPL CALCULATED.3IONS-SCNC: 9 MMOL/L (ref 5–15)
AST SERPL-CCNC: 21 U/L (ref 1–32)
BARBITURATE INTERNAL CONTROL: NORMAL
BARBITURATES UR QL SCN: NEGATIVE
BENZODIAZ UR QL SCN: NEGATIVE
BENZODIAZEPINE INTERNAL CONTROL: NORMAL
BILIRUB SERPL-MCNC: 0.4 MG/DL (ref 0–1.2)
BUN SERPL-MCNC: 19 MG/DL (ref 8–23)
BUN/CREAT SERPL: 20 (ref 7–25)
BUPRENORPHINE INTERNAL CONTROL: NORMAL
BUPRENORPHINE SERPL-MCNC: NEGATIVE NG/ML
CALCIUM SPEC-SCNC: 9.7 MG/DL (ref 8.6–10.5)
CANNABINOIDS SERPL QL: NEGATIVE
CHLORIDE SERPL-SCNC: 98 MMOL/L (ref 98–107)
CHOLEST SERPL-MCNC: 193 MG/DL (ref 0–200)
CO2 SERPL-SCNC: 30 MMOL/L (ref 22–29)
COCAINE INTERNAL CONTROL: NORMAL
COCAINE UR QL: NEGATIVE
CREAT SERPL-MCNC: 0.95 MG/DL (ref 0.57–1)
DEPRECATED RDW RBC AUTO: 40.3 FL (ref 37–54)
EGFRCR SERPLBLD CKD-EPI 2021: 67 ML/MIN/1.73
ERYTHROCYTE [DISTWIDTH] IN BLOOD BY AUTOMATED COUNT: 12.2 % (ref 12.3–15.4)
EXPIRATION DATE: NORMAL
GLOBULIN UR ELPH-MCNC: 3 GM/DL
GLUCOSE SERPL-MCNC: 93 MG/DL (ref 65–99)
HCT VFR BLD AUTO: 42.2 % (ref 34–46.6)
HDLC SERPL-MCNC: 50 MG/DL (ref 40–60)
HGB BLD-MCNC: 14 G/DL (ref 12–15.9)
IRON 24H UR-MRATE: 67 MCG/DL (ref 37–145)
IRON SATN MFR SERPL: 15 % (ref 20–50)
LDLC SERPL CALC-MCNC: 120 MG/DL (ref 0–100)
LDLC/HDLC SERPL: 2.34 {RATIO}
Lab: NORMAL
MCH RBC QN AUTO: 29.6 PG (ref 26.6–33)
MCHC RBC AUTO-ENTMCNC: 33.2 G/DL (ref 31.5–35.7)
MCV RBC AUTO: 89.2 FL (ref 79–97)
MDMA (ECSTASY) INTERNAL CONTROL: NORMAL
MDMA UR QL SCN: NEGATIVE
METHADONE INTERNAL CONTROL: NORMAL
METHADONE UR QL SCN: NEGATIVE
METHAMPHETAMINE INTERNAL CONTROL: NORMAL
OPIATES INTERNAL CONTROL: NORMAL
OPIATES UR QL: NEGATIVE
OXYCODONE INTERNAL CONTROL: NORMAL
OXYCODONE UR QL SCN: NEGATIVE
PCP UR QL SCN: NEGATIVE
PHENCYCLIDINE INTERNAL CONTROL: NORMAL
PLATELET # BLD AUTO: 293 10*3/MM3 (ref 140–450)
PMV BLD AUTO: 11.6 FL (ref 6–12)
POTASSIUM SERPL-SCNC: 3.9 MMOL/L (ref 3.5–5.2)
PROT SERPL-MCNC: 7.5 G/DL (ref 6–8.5)
RBC # BLD AUTO: 4.73 10*6/MM3 (ref 3.77–5.28)
SODIUM SERPL-SCNC: 137 MMOL/L (ref 136–145)
T4 FREE SERPL-MCNC: 1.3 NG/DL (ref 0.93–1.7)
THC INTERNAL CONTROL: NORMAL
TIBC SERPL-MCNC: 457 MCG/DL (ref 298–536)
TRANSFERRIN SERPL-MCNC: 307 MG/DL (ref 200–360)
TRIGL SERPL-MCNC: 129 MG/DL (ref 0–150)
TSH SERPL DL<=0.05 MIU/L-ACNC: 3.21 UIU/ML (ref 0.27–4.2)
VIT B12 BLD-MCNC: 427 PG/ML (ref 211–946)
VLDLC SERPL-MCNC: 23 MG/DL (ref 5–40)
WBC NRBC COR # BLD: 6.56 10*3/MM3 (ref 3.4–10.8)

## 2023-03-10 PROCEDURE — 80061 LIPID PANEL: CPT

## 2023-03-10 PROCEDURE — 36415 COLL VENOUS BLD VENIPUNCTURE: CPT

## 2023-03-10 PROCEDURE — 84466 ASSAY OF TRANSFERRIN: CPT

## 2023-03-10 PROCEDURE — 82306 VITAMIN D 25 HYDROXY: CPT

## 2023-03-10 PROCEDURE — 99214 OFFICE O/P EST MOD 30 MIN: CPT | Performed by: NURSE PRACTITIONER

## 2023-03-10 PROCEDURE — 83540 ASSAY OF IRON: CPT

## 2023-03-10 PROCEDURE — 84439 ASSAY OF FREE THYROXINE: CPT

## 2023-03-10 PROCEDURE — 80305 DRUG TEST PRSMV DIR OPT OBS: CPT | Performed by: NURSE PRACTITIONER

## 2023-03-10 PROCEDURE — 82607 VITAMIN B-12: CPT

## 2023-03-10 PROCEDURE — 80050 GENERAL HEALTH PANEL: CPT

## 2023-03-10 RX ORDER — ZOLPIDEM TARTRATE 12.5 MG/1
12.5 TABLET, FILM COATED, EXTENDED RELEASE ORAL NIGHTLY PRN
Qty: 90 TABLET | Refills: 0 | Status: SHIPPED | OUTPATIENT
Start: 2023-03-10

## 2023-03-10 RX ORDER — VALSARTAN 80 MG/1
80 TABLET ORAL 2 TIMES DAILY
Qty: 180 TABLET | Refills: 1 | Status: SHIPPED | OUTPATIENT
Start: 2023-03-10 | End: 2023-06-11

## 2023-03-10 NOTE — PROGRESS NOTES
"Chief Complaint  Fatigue    Subjective         Janna Rich presents to [facility]  Patient presents to the office today to discuss her fatigue.  States that she has not felt right ever since they will be thyroid to put her in hypothyroidism.  Patient's last TSH was 0.4.  She states that she continues to be generally fatigued.  She also states that she is becoming very forgetful and very anxious.  Patient has had many stressors in her life recently.  She states that she sees changes in her blood pressure.  I did explain this many years since she has had routine lab work and we would obtain this to further evaluate her symptoms.  I explained that if all of her labs were unremarkable then I would change her levothyroxine to branded Synthroid to see if this proved her fatigue.  I also discussed obtaining GeneSight to further evaluate her mental health.  Patient recently started back on her Wellbutrin a week ago.  He also states that she is sleeping throughout the night.  She states that the Ambien puts her to sleep but she is able to stay asleep.  I did discuss switching her Ambien to CR to help keep her sleep through the night.    Fatigue  Associated symptoms include fatigue.        Objective     Vitals:    03/10/23 0835   BP: 112/80   BP Location: Right arm   Patient Position: Sitting   Cuff Size: Adult   Pulse: 67   Temp: 97.3 °F (36.3 °C)   TempSrc: Temporal   SpO2: 99%   Weight: 61.8 kg (136 lb 3.2 oz)   Height: 152.4 cm (60\")      Body mass index is 26.6 kg/m².    BMI is >= 25 and <30. (Overweight) The following options were offered after discussion;: nutrition counseling/recommendations        Physical Exam  Vitals reviewed.   Constitutional:       Appearance: Normal appearance.   Cardiovascular:      Rate and Rhythm: Normal rate and regular rhythm.      Pulses: Normal pulses.      Heart sounds: Normal heart sounds, S1 normal and S2 normal. No murmur heard.  Pulmonary:      " Effort: Pulmonary effort is normal. No respiratory distress.      Breath sounds: Normal breath sounds.   Skin:     General: Skin is warm and dry.   Neurological:      Mental Status: She is alert and oriented to person, place, and time.   Psychiatric:         Attention and Perception: Attention normal.         Mood and Affect: Mood normal.         Behavior: Behavior normal.          Result Review :   The following data was reviewed by: BELLE Worthy on 03/10/2023:      Procedures    Assessment and Plan   Diagnoses and all orders for this visit:    1. Medication management (Primary)  -     POC Urine Drug Screen [facility]    2. Fatigue, unspecified type  -     CBC (No Diff); Future  -     Comprehensive Metabolic Panel; Future  -     TSH+Free T4; Future  -     Vitamin D 25 hydroxy; Future  -     Vitamin B12; Future  -     Iron and TIBC; Future    3. Screening for lipid disorders  -     Lipid Panel; Future    4. Hypothyroidism, unspecified type  -     TSH+Free T4; Future    5. Anxiety  -     GeneSight - Swab,; Future    6. Major depressive disorder, recurrent, mild (HCC)  -     GeneSight - Swab,; Future    7. Insomnia, unspecified type  -     zolpidem CR (Ambien CR) 12.5 MG CR tablet; Take 1 tablet by mouth At Night As Needed for Sleep.  Dispense: 90 tablet; Refill: 0    8. Primary hypertension  -     valsartan (DIOVAN) 80 MG tablet; Take 1 tablet by mouth 2 (Two) Times a Day for 90 days.  Dispense: 180 tablet; Refill: 1      Follow Up   Return if symptoms worsen or fail to improve.  Patient was given instructions and counseling regarding her condition or for health maintenance advice. Please see specific information pulled into the AVS if appropriate.

## 2023-03-14 DIAGNOSIS — E03.9 ACQUIRED HYPOTHYROIDISM: ICD-10-CM

## 2023-03-14 RX ORDER — LEVOTHYROXINE SODIUM 137 UG/1
137 TABLET ORAL DAILY
Qty: 90 TABLET | Refills: 0 | Status: SHIPPED | OUTPATIENT
Start: 2023-03-14

## 2023-04-13 ENCOUNTER — TELEPHONE (OUTPATIENT)
Dept: FAMILY MEDICINE CLINIC | Facility: CLINIC | Age: 63
End: 2023-04-13

## 2023-04-13 ENCOUNTER — TELEPHONE (OUTPATIENT)
Dept: FAMILY MEDICINE CLINIC | Facility: CLINIC | Age: 63
End: 2023-04-13
Payer: COMMERCIAL

## 2023-04-13 DIAGNOSIS — G47.00 INSOMNIA, UNSPECIFIED TYPE: ICD-10-CM

## 2023-04-13 RX ORDER — ZOLPIDEM TARTRATE 12.5 MG/1
12.5 TABLET, FILM COATED, EXTENDED RELEASE ORAL NIGHTLY PRN
Qty: 90 TABLET | Refills: 0 | Status: CANCELLED | OUTPATIENT
Start: 2023-04-13

## 2023-04-13 NOTE — TELEPHONE ENCOUNTER
Patient called regarding her zolpidem CR (Ambien CR) 12.5 MG CR tablet.  [facility] said [facility] the CR [facility] only [facility] ER.  She is needing a new prescription sent to [facility].    [phone]

## 2023-04-13 NOTE — TELEPHONE ENCOUNTER
Caller: Janna Rich    Relationship: Self    Best call back number: [phone]    Requested Prescriptions:   Requested Prescriptions     Pending Prescriptions Disp Refills   • zolpidem CR (Ambien CR) 12.5 MG CR tablet 90 tablet 0     Sig: Take 1 tablet by mouth At Night As Needed for Sleep.        Pharmacy where request should be sent: [facility] [address] [phone] [facility] [phone] [facility]     Last office visit with prescribing clinician: 3/10/2023   Last telemedicine visit with prescribing clinician: Visit date not found   Next office visit with prescribing clinician: Visit date not found     Does the patient have less than a 3 day supply:  [] Yes  [x] No    Would you like a call back once the refill request has been completed: [] Yes [x] No    If the office needs to give you a call back, can they leave a voicemail: [] Yes [x] No    Natacha Osborn, PCT   04/13/23 08:55 EDT

## 2023-04-13 NOTE — TELEPHONE ENCOUNTER
Caller: Janna Rich    Relationship: Self    Best call back number: [phone]    What test was performed: PANEL FOR DEPRESSION MEDICATION    Additional notes: PLEASE CALL WITH THESE RESULTS

## 2023-04-17 RX ORDER — ZOLPIDEM TARTRATE 12.5 MG/1
12.5 TABLET, FILM COATED, EXTENDED RELEASE ORAL NIGHTLY PRN
Qty: 90 TABLET | Refills: 1 | Status: SHIPPED | OUTPATIENT
Start: 2023-04-17

## 2023-04-24 DIAGNOSIS — F33.0 MAJOR DEPRESSIVE DISORDER, RECURRENT, MILD: Primary | ICD-10-CM

## 2023-04-24 DIAGNOSIS — E03.9 ACQUIRED HYPOTHYROIDISM: ICD-10-CM

## 2023-04-24 DIAGNOSIS — I10 PRIMARY HYPERTENSION: ICD-10-CM

## 2023-04-24 RX ORDER — VALSARTAN 80 MG/1
80 TABLET ORAL 2 TIMES DAILY
Qty: 180 TABLET | Refills: 1 | Status: SHIPPED | OUTPATIENT
Start: 2023-04-24 | End: 2023-07-23

## 2023-04-24 RX ORDER — HYDROCHLOROTHIAZIDE 25 MG/1
25 TABLET ORAL DAILY
Qty: 90 TABLET | Refills: 1 | Status: SHIPPED | OUTPATIENT
Start: 2023-04-24

## 2023-04-24 RX ORDER — LEVOTHYROXINE SODIUM 137 UG/1
137 TABLET ORAL DAILY
Qty: 90 TABLET | Refills: 0 | Status: SHIPPED | OUTPATIENT
Start: 2023-04-24

## 2023-04-24 NOTE — TELEPHONE ENCOUNTER
Caller: [facility] - [place] [id number] [address] [phone] FX    Relationship: Pharmacy    Best call back number:  FAILED SCREENING 4/24/23    Requested Prescriptions:   Requested Prescriptions     Pending Prescriptions Disp Refills   • hydroCHLOROthiazide (HYDRODIURIL) 25 MG tablet 90 tablet 1     Sig: Take 1 tablet by mouth Daily.   • levothyroxine (SYNTHROID, LEVOTHROID) 137 MCG tablet 90 tablet 0     Sig: Take 1 tablet by mouth Daily.   • metoprolol tartrate (LOPRESSOR) 25 MG tablet 180 tablet 1     Sig: Take 1 tablet by mouth 2 (Two) Times a Day.   • valsartan (DIOVAN) 80 MG tablet 180 tablet 1     Sig: Take 1 tablet by mouth 2 (Two) Times a Day for 90 days.        Pharmacy where request should be sent: [facility] - [place] [phone] [place], [place] [phone] FX   ADDITIONAL INFO:  [facility] STATES THEY WOULD LIKE 3 REFILLS FOR THESE MEDICATIONS.     Last office visit with prescribing clinician: 3/10/2023   Last telemedicine visit with prescribing clinician: Visit date not found   Next office visit with prescribing clinician: Visit date not found     Does the patient have less than a 3 day supply:  [x] Yes  [] No    Jasson Hernandes   04/24/23 10:05 EDT

## 2023-05-01 RX ORDER — DESVENLAFAXINE SUCCINATE 50 MG/1
50 TABLET, EXTENDED RELEASE ORAL DAILY
Qty: 90 TABLET | Refills: 0 | Status: SHIPPED | OUTPATIENT
Start: 2023-05-01

## 2023-05-01 NOTE — TELEPHONE ENCOUNTER
----- Message from Janna Rich sent at 5/1/2023  2:33 PM EDT -----  Regarding: Question regarding GENES[facility]  Contact: [phone]  [facility] the [employer] results , Shan wanted to try pristiq, the generic is desvenlafaxine. This is a new script. His message didn’t include dosage but [facility] site says it’s usually 50 mg daily. 90 day supply of generic is around $35

## 2023-06-27 PROBLEM — M79.644 FINGER PAIN, RIGHT: Status: ACTIVE | Noted: 2023-06-27

## 2023-06-27 PROBLEM — T14.8XXA AVULSION FRACTURE: Status: ACTIVE | Noted: 2023-06-27

## 2023-07-26 ENCOUNTER — TELEPHONE (OUTPATIENT)
Dept: ORTHOPEDIC SURGERY | Facility: CLINIC | Age: 63
End: 2023-07-26
Payer: MEDICARE

## 2023-07-26 DIAGNOSIS — M79.671 RIGHT FOOT PAIN: Primary | ICD-10-CM

## 2023-07-26 NOTE — TELEPHONE ENCOUNTER
Patient is requesting an MRI on her foot. Order placed just need signed in order to schedule. Thanks.

## 2023-07-26 NOTE — TELEPHONE ENCOUNTER
"  Hub staff attempted to follow warm transfer process and was unsuccessful     Caller: Janna Rich    Relationship to patient: Self    Best call back number: [phone] (home)       Patient is needing: PATIENT STATES THE AIR CAST IS TOO SMALL AND IS CAUSING PAIN. SHE SAID DR NESS SAID TO COME BY AND TRY THE NEXT SIZE OF THE BOOT. PLEASE CALL TO LET HER KNOW IF SHE CAN JUST \"SWING BY\" OR IF SHE NEEDS AN APPOINTMENT          "

## 2023-07-26 NOTE — TELEPHONE ENCOUNTER
Called and left a message for the patient regarding her boot. Per Dr. Prather he would like to see her tomorrow at 8 for repeat x-rays if patient calls back

## 2023-07-27 ENCOUNTER — OFFICE VISIT (OUTPATIENT)
Dept: ORTHOPEDIC SURGERY | Facility: CLINIC | Age: 63
End: 2023-07-27
Payer: MEDICARE

## 2023-07-27 VITALS
SYSTOLIC BLOOD PRESSURE: 124 MMHG | OXYGEN SATURATION: 96 % | WEIGHT: 140 LBS | HEART RATE: 57 BPM | DIASTOLIC BLOOD PRESSURE: 76 MMHG | BODY MASS INDEX: 23.9 KG/M2 | HEIGHT: 64 IN

## 2023-07-27 DIAGNOSIS — M79.671 RIGHT FOOT PAIN: Primary | ICD-10-CM

## 2023-07-27 DIAGNOSIS — T14.8XXA AVULSION FRACTURE: ICD-10-CM

## 2023-07-27 RX ORDER — TRAMADOL HYDROCHLORIDE 50 MG/1
50 TABLET ORAL EVERY 6 HOURS PRN
Qty: 30 TABLET | Refills: 0 | Status: SHIPPED | OUTPATIENT
Start: 2023-07-27

## 2023-07-27 NOTE — PROGRESS NOTES
"Chief Complaint  Follow-up of the Right Foot     Subjective      Janna Rich presents to [facility] for follow up evaluation of the right foot. The patient has been treating her right 5th metatarsal fracture conservatively. She has been wearing a CAM walker boot. She locates pain to her lateral foot. She reports pain when wearing a boot.     Allergies   Allergen Reactions    Codeine Itching    Morphine Swelling    Ciprofloxacin Rash        Social History     Socioeconomic History    Marital status:    Tobacco Use    Smoking status: Never    Smokeless tobacco: Never   Vaping Use    Vaping Use: Never used   Substance and Sexual Activity    Alcohol use: Yes     Comment: light 04/24/2018 - 1-2 beers month    Drug use: Never     Comment: denies substance abuse    Sexual activity: Yes     Partners: Male        Review of Systems     Objective   Vital Signs:   /76   Pulse 57   Ht 162.6 cm (64\")   Wt 63.5 kg (140 lb)   SpO2 96%   BMI 24.03 kg/m²       Physical Exam  Constitutional:       Appearance: Normal appearance. The patient is well-developed and normal weight.   HENT:      Head: Normocephalic.      Right Ear: Hearing and external ear normal.      Left Ear: Hearing and external ear normal.      Nose: Nose normal.   Eyes:      Conjunctiva/sclera: Conjunctivae normal.   Cardiovascular:      Rate and Rhythm: Normal rate.   Pulmonary:      Effort: Pulmonary effort is normal.      Breath sounds: No wheezing or rales.   Abdominal:      Palpations: Abdomen is soft.      Tenderness: There is no abdominal tenderness.   Musculoskeletal:      Cervical back: Normal range of motion.   Skin:     Findings: No rash.   Neurological:      Mental Status: The patient is alert and oriented to person, place, and time.   Psychiatric:         Mood and Affect: Mood and affect normal.         Judgment: Judgment normal.       Ortho Exam      Right foot- tender to the right distal 5th metatarsal " and minimal tenderness to the metatarsal base. Positive EHL, FHL, GS and TA. Sensation intact to all 5 nerves of the foot. Positive pulses. Neurovascularly intact. Mild swelling.     Procedures    X-Ray Report:  Right foot X-Ray  Indication: Evaluation of right foot pain  AP/Lateral view(s)  Findings: non-displaced avulsion fracture at the base of the 5th metatarsal.   Prior studies available for comparison: yes    Imaging Results (Most Recent)       Procedure Component Value Units Date/Time    XR Foot 2 View Right [[id number]] Resulted: 07/27/23 0828     Updated: 07/27/23 0832             Result Review :       No results found.           Assessment and Plan     Diagnoses and all orders for this visit:    1. Right foot pain (Primary)  -     XR Foot 2 View Right    2. Avulsion fracture 5th metatarsal, right      Discussed the treatment plan with the patient.  Hard sole shoe given today.  Prescription for Tramadol given today. Plan for MRI of the right foot. The patient expressed understanding and wished to proceed.     Call or return if worsening symptoms.    Follow Up     MRI results      Patient was given instructions and counseling regarding her condition or for health maintenance advice. Please see specific information pulled into the AVS if appropriate.     Scribed for Selwyn Prather MD by Vida Adhikari.  07/27/23   08:36 EDT    I have personally performed the services described in this document as scribed by the above individual and it is both accurate and complete. Selwyn Prather MD 07/28/23

## 2023-08-22 ENCOUNTER — HOSPITAL ENCOUNTER (OUTPATIENT)
Dept: MRI IMAGING | Facility: HOSPITAL | Age: 63
Discharge: HOME | End: 2023-08-22
Admitting: ORTHOPAEDIC SURGERY
Payer: MEDICARE

## 2023-08-22 DIAGNOSIS — M79.671 RIGHT FOOT PAIN: ICD-10-CM

## 2023-08-22 PROCEDURE — 73718 MRI LOWER EXTREMITY W/O DYE: CPT

## 2023-08-24 ENCOUNTER — OFFICE VISIT (OUTPATIENT)
Dept: ORTHOPEDIC SURGERY | Facility: CLINIC | Age: 63
End: 2023-08-24
Payer: MEDICARE

## 2023-08-24 VITALS — HEIGHT: 64 IN | WEIGHT: 140 LBS | BODY MASS INDEX: 23.9 KG/M2

## 2023-08-24 DIAGNOSIS — S92.354S CLOSED NONDISPLACED FRACTURE OF FIFTH METATARSAL BONE OF RIGHT FOOT, SEQUELA: Primary | ICD-10-CM

## 2023-08-24 PROBLEM — S92.354A CLOSED NONDISPLACED FRACTURE OF FIFTH RIGHT METATARSAL BONE: Status: ACTIVE | Noted: 2023-08-24

## 2023-08-24 NOTE — PROGRESS NOTES
"Chief Complaint  Pain and Follow-up of the Right Foot     Subjective      Janna Rich presents to [facility] for follow up evaluation of the right foot. The patient has been treating her right 5th metatarsal fracture conservatively. She recently had an MRI and is here today for those results. She has been wearing a hardsole shoe. She has no new complaints.     Allergies   Allergen Reactions    Codeine Itching    Morphine Swelling    Ciprofloxacin Rash        Social History     Socioeconomic History    Marital status:    Tobacco Use    Smoking status: Never    Smokeless tobacco: Never   Vaping Use    Vaping Use: Never used   Substance and Sexual Activity    Alcohol use: Yes     Comment: light 04/24/2018 - 1-2 beers month    Drug use: Never     Comment: denies substance abuse    Sexual activity: Yes     Partners: Male        I reviewed the patient's chief complaint, history of present illness, review of systems, past medical history, surgical history, family history, social history, medications, and allergy list.     Review of Systems     Constitutional: Denies fevers, chills, weight loss  Cardiovascular: Denies chest pain, shortness of breath  Skin: Denies rashes, acute skin changes  Neurologic: Denies headache, loss of consciousness  MSK: Right foot pain      Vital Signs:   Ht 162.6 cm (64\")   Wt 63.5 kg (140 lb)   BMI 24.03 kg/mý          Physical Exam  General: Alert. No acute distress    Ortho Exam      Right foot-  tender to the right distal 5th metatarsal and minimal tenderness to the metatarsal base. Positive EHL, FHL, GS and TA. Sensation intact to all 5 nerves of the foot. Positive pulses. Neurovascularly intact. Mild swelling.      Procedures      Imaging Results (Most Recent)       None             Result Review :       XR Foot 2 View Right    Result Date: 7/27/2023  Narrative: X-Ray Report: Right foot X-Ray Indication: Evaluation of right foot pain AP/Lateral " view(s) Findings: non-displaced avulsion fracture at the base of the 5th metatarsal. Prior studies available for comparison: yes     MRI Foot Right Without Contrast    Result Date: 8/22/2023  Narrative: MRI RIGHT MIDFOOT AND FOREFOOT WITHOUT CONTRAST  HISTORY: Right foot pain.  TECHNIQUE: MRI right midfoot and forefoot includes short axis coronal T1, T2 fat-sat as well as axial T1, STIR and sagittal PD weighted sequences.  FINDINGS: There is a nondisplaced coronally oriented fracture of the base of the 5th metatarsal that does not appear united. This is without displacement. There is surrounding bone marrow edema. Midfoot alignment appears normal. There are chronic arthritic changes at the TMT joints particularly at the dorsal aspect of the 2nd TMT joint where there is mild surrounding bone marrow edema. Flexor and extensor tendons appear intact. There is sclerosis within the distal shaft and neck of the 3rd metatarsal consistent with a bone island. There is no disproportionate muscular atrophy. Visualized plantar fascia appears intact.      Impression: 1. Nondisplaced fracture of the base of the 5th metatarsal is not united with surrounding bone marrow edema. 2. 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 3. Bone island within the distal shaft and neck of the 3rd metatarsal.   This report was finalized on 8/22/2023 3:45 PM by Dr. Dionicio Ca M.D.              Assessment and Plan     Diagnoses and all orders for this visit:    1. Closed nondisplaced fracture of fifth metatarsal bone of right foot, sequela (Primary)        Discussed the treatment plan with the patient.  I reviewed the MRI with the patient. Her fracture line is still visible, less than 1cm gap. Order for a bone stimulator given today. Plan to continue hard sole shoe.     Call or return if worsening symptoms.    Follow Up     4 weeks with repeat x-rays      Patient was given instructions and counseling  regarding her condition or for health maintenance advice. Please see specific information pulled into the AVS if appropriate.     Scribed for Selwyn Prather MD by Vida Adhikari.  08/24/23   09:46 EDT    I have personally performed the services described in this document as scribed by the above individual and it is both accurate and complete. Selwyn Prather MD 08/25/23

## 2023-08-28 DIAGNOSIS — S92.354K CLOSED NONDISPLACED FRACTURE OF FIFTH METATARSAL BONE OF RIGHT FOOT WITH NONUNION, SUBSEQUENT ENCOUNTER: Primary | ICD-10-CM

## 2023-10-05 ENCOUNTER — OFFICE VISIT (OUTPATIENT)
Dept: ORTHOPEDIC SURGERY | Facility: CLINIC | Age: 63
End: 2023-10-05
Payer: MEDICARE

## 2023-10-05 VITALS — OXYGEN SATURATION: 98 % | WEIGHT: 138 LBS | HEIGHT: 64 IN | BODY MASS INDEX: 23.56 KG/M2 | HEART RATE: 80 BPM

## 2023-10-05 DIAGNOSIS — M17.12 OSTEOARTHRITIS OF LEFT KNEE, UNSPECIFIED OSTEOARTHRITIS TYPE: ICD-10-CM

## 2023-10-05 DIAGNOSIS — S92.354K CLOSED NONDISPLACED FRACTURE OF FIFTH METATARSAL BONE OF RIGHT FOOT WITH NONUNION, SUBSEQUENT ENCOUNTER: ICD-10-CM

## 2023-10-05 DIAGNOSIS — M25.571 RIGHT ANKLE PAIN, UNSPECIFIED CHRONICITY: ICD-10-CM

## 2023-10-05 DIAGNOSIS — M25.562 LEFT KNEE PAIN, UNSPECIFIED CHRONICITY: Primary | ICD-10-CM

## 2023-10-05 DIAGNOSIS — M79.671 RIGHT FOOT PAIN: ICD-10-CM

## 2023-10-05 DIAGNOSIS — S90.01XA CONTUSION OF RIGHT ANKLE, INITIAL ENCOUNTER: ICD-10-CM

## 2023-10-05 RX ADMIN — LIDOCAINE HYDROCHLORIDE 5 ML: 10 INJECTION, SOLUTION INFILTRATION; PERINEURAL at 14:17

## 2023-10-05 NOTE — PROGRESS NOTES
"Chief Complaint  Initial Evaluation of the Left Knee, Follow-up of the Right Foot, and Initial Evaluation of the Right Ankle     Subjective      Janna Rich presents to [facility] for evaluation of the left knee, right ankle and follow up evaluation of the right foot. The patient has been treating her 5th metatarsal fracture conservatively. She is still having pain to the right foot. She reports a new injury to her right ankle 2 weeks ago when a piece of granite hit her ankle. She also has left knee pain      Allergies   Allergen Reactions    Codeine Itching    Morphine Swelling    Ciprofloxacin Rash        Social History     Socioeconomic History    Marital status:    Tobacco Use    Smoking status: Never    Smokeless tobacco: Never   Vaping Use    Vaping Use: Never used   Substance and Sexual Activity    Alcohol use: Yes     Comment: light 04/24/2018 - 1-2 beers month    Drug use: Never     Comment: denies substance abuse    Sexual activity: Yes     Partners: Male        I reviewed the patient's chief complaint, history of present illness, review of systems, past medical history, surgical history, family history, social history, medications, and allergy list.     Review of Systems     Constitutional: Denies fevers, chills, weight loss  Cardiovascular: Denies chest pain, shortness of breath  Skin: Denies rashes, acute skin changes  Neurologic: Denies headache, loss of consciousness  MSK: Right lower extremity and left knee.       Vital Signs:   Pulse 80   Ht 162.6 cm (64\")   Wt 62.6 kg (138 lb)   SpO2 98%   BMI 23.69 kg/m²          Physical Exam  General: Alert. No acute distress    Ortho Exam      Right lower extremity- tender to the lateral ankle. Neurovascularly intact. Positive EHL, FHL, GS and TA. Sensation intact to all 5 nerves of the foot. Positive pulses. Intact ankle plantar flexion and dorsiflexion. Mild tenderness to the 5th metatarsal.     Left knee- Stable " to varus/valgus stress. Stable to anterior/posterior drawer. Positive crepitus. Stable to anterior/posterior drawer. Positive EHL, FHL, GS and TA. Sensation intact to all 5 nerves of the foot. Tender to the anterior knee. ROM 0-125 degrees. Knee Extensor Mechanism  intact. Negative Judit's. Negative Lachman's.     Large Joint Arthrocentesis: L knee  Date/Time: 10/5/2023 2:17 PM  Consent given by: patient  Site marked: site marked  Timeout: Immediately prior to procedure a time out was called to verify the correct patient, procedure, equipment, support staff and site/side marked as required   Supporting Documentation  Indications: pain   Procedure Details  Location: knee - L knee  Needle gauge: 21g.  Medications administered: 32 mg Triamcinolone Acetonide 32 MG; 5 mL lidocaine 1 %  Patient tolerance: patient tolerated the procedure well with no immediate complications      X-Ray Report:  Right foot X-Ray  Indication: Evaluation of right foot pain  AP/Lateral view(s)  Findings: healing 5th metatarsal fracture.  Prior studies available for comparison: yes     X-Ray Report:  Right ankle(s) X-Ray  Indication: Evaluation of right ankle pain  AP/Lateral view(s)  Findings: soft tissue swelling, no acute fracture.   Prior studies available for comparison: no     X-Ray Report:  Left knee X-Ray  Indication: Evaluation of left knee pain  AP/Lateral, Standing, and [place] view(s)  Findings: moderate tricompartmental osteophytes. Moderate advanced patellofemoral osteoarthritis   Prior studies available for comparison: no       Imaging Results (Most Recent)       Procedure Component Value Units Date/Time    XR Ankle 2 View Right [[id number]] Resulted: 10/05/23 1335     Updated: 10/05/23 1342    XR Foot 2 View Right [[id number]] Resulted: 10/05/23 1335     Updated: 10/05/23 1342    XR Knee 3 View Left [[id number]] Resulted: 10/05/23 1335     Updated: 10/05/23 1342             Result Review :       No results found.            Assessment and Plan     Diagnoses and all orders for this visit:    1. Left knee pain, unspecified chronicity (Primary)  -     XR Knee 3 View Left    2. Right foot pain  -     XR Foot 2 View Right    3. Right ankle pain, unspecified chronicity  -     XR Ankle 2 View Right    4. Closed nondisplaced fracture of fifth metatarsal bone of right foot with nonunion, subsequent encounter    5. Contusion of right ankle, initial encounter    6. Osteoarthritis of left knee, unspecified osteoarthritis type        Discussed the treatment plan with the patient.  I reviewed the x-rays that were obtained today with the patient. Discussed the risks and benefits of a left knee Zilretta injection. The patient expressed understanding and wished to proceed. She tolerated the injection well. Ice and elevate the ankle for swelling.     Call or return if worsening symptoms.    Follow Up     PRN      Patient was given instructions and counseling regarding her condition or for health maintenance advice. Please see specific information pulled into the AVS if appropriate.     Scribed for Selwyn Prather MD by Vida Adhikari.  10/05/23   13:34 EDT    I have personally performed the services described in this document as scribed by the above individual and it is both accurate and complete. Selwyn Prather MD 10/06/23

## 2023-10-06 RX ORDER — LIDOCAINE HYDROCHLORIDE 10 MG/ML
5 INJECTION, SOLUTION INFILTRATION; PERINEURAL
Status: COMPLETED | OUTPATIENT
Start: 2023-10-05 | End: 2023-10-05

## 2023-10-17 DIAGNOSIS — M25.562 LEFT KNEE PAIN, UNSPECIFIED CHRONICITY: Primary | ICD-10-CM

## 2023-11-03 ENCOUNTER — HOSPITAL ENCOUNTER (OUTPATIENT)
Dept: MRI IMAGING | Facility: HOSPITAL | Age: 63
Discharge: HOME | End: 2023-11-03
Admitting: ORTHOPAEDIC SURGERY
Payer: MEDICARE

## 2023-11-03 DIAGNOSIS — M25.562 LEFT KNEE PAIN, UNSPECIFIED CHRONICITY: ICD-10-CM

## 2023-11-03 PROCEDURE — 73721 MRI JNT OF LWR EXTRE W/O DYE: CPT

## 2023-11-10 ENCOUNTER — OFFICE VISIT (OUTPATIENT)
Dept: ORTHOPEDIC SURGERY | Facility: CLINIC | Age: 63
End: 2023-11-10
Payer: MEDICARE

## 2023-11-10 VITALS — BODY MASS INDEX: 26.9 KG/M2 | WEIGHT: 137 LBS | HEIGHT: 60 IN

## 2023-11-10 DIAGNOSIS — S83.002A SUBLUXATION OF LEFT PATELLA, INITIAL ENCOUNTER: Primary | ICD-10-CM

## 2023-11-10 DIAGNOSIS — M17.12 PRIMARY OSTEOARTHRITIS OF LEFT KNEE: ICD-10-CM

## 2023-11-10 DIAGNOSIS — M67.40 GANGLION CYST: ICD-10-CM

## 2023-11-10 NOTE — PROGRESS NOTES
"Chief Complaint  Follow-up of the Left Knee     Subjective      Janna Rich presents to [facility] for follow up evaluation of the left knee. The patient recently had an MRI and is here today for those results. To review, she locates her pain to the posterior knee. She has pain with flexion and extension.     Allergies   Allergen Reactions    Codeine Itching    Morphine Swelling    Ciprofloxacin Rash        Social History     Socioeconomic History    Marital status:    Tobacco Use    Smoking status: Never    Smokeless tobacco: Never   Vaping Use    Vaping Use: Never used   Substance and Sexual Activity    Alcohol use: Yes     Comment: light 04/24/2018 - 1-2 beers month    Drug use: Never     Comment: denies substance abuse    Sexual activity: Yes     Partners: Male        I reviewed the patient's chief complaint, history of present illness, review of systems, past medical history, surgical history, family history, social history, medications, and allergy list.     Review of Systems     Constitutional: Denies fevers, chills, weight loss  Cardiovascular: Denies chest pain, shortness of breath  Skin: Denies rashes, acute skin changes  Neurologic: Denies headache, loss of consciousness  MSK: left knee pain      Vital Signs:   Ht 152.4 cm (60\")   Wt 62.1 kg (137 lb)   BMI 26.76 kg/m²          Physical Exam  General: Alert. No acute distress    Ortho Exam      Left knee- Stable to varus/valgus stress. Stable to anterior/posterior drawer. Positive crepitus. Stable to anterior/posterior drawer. Positive EHL, FHL, GS and TA. Sensation intact to all 5 nerves of the foot. Tender to the anterior knee. ROM 0-135 degrees. Knee Extensor Mechanism  intact. Negative Judit's. Negative Lachman's.     Procedures      Imaging Results (Most Recent)       None             Result Review :       MRI Knee Left Without Contrast    Result Date: 11/6/2023  Narrative: PROCEDURE: MRI KNEE LEFT  WO " CONTRAST  COMPARISON: [facility] , CR, XR KNEE 3 VW LEFT, 10/05/2023, 13:48.  INDICATIONS: POSTERIOR LEFT KNEE PAIN SINCE HAVING STEROID INJECTION.      TECHNIQUE: A complete multi-planar MRI was performed.   FINDINGS:  No fracture is identified.  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  There is 1.8 cm lateral subluxation of the patella.  There is severe lateral patellar tilt.  No meniscal tear is seen.  The cruciate ligaments, medial collateral ligament, patellar retinacula and extensor mechanism appear intact.  Interposed between the proximal tibia and fibula is a 4.4 cm x 1.4 cm T2 high signal focus which could represent a conglomeration of vessels, synovial cyst or ganglion.  The lateral collateral ligament complex is intact.  A small knee joint effusion is noted.  There is grade 4 chondromalacia noted along the lateral patellar facet measuring 2.2 cm transverse.  Grade 1 chondromalacia is noted in the medial and lateral compartments.  No loose body is seen.  Mild osteoarthritic spurring is noted.  No popliteal cyst is evident.      Impression:   1. 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 4.4 cm x 1.4 cm suspected synovial cyst or ganglion interposed between the proximal fibula and tibia.  A conglomeration of vessels would also be in the differential. 3. Grade 4 patellar chondromalacia     Peterson Quintana M.D.       Electronically Signed and Approved By: Peterson Quintana M.D. on 11/06/2023 at 9:03                     Assessment and Plan     Diagnoses and all orders for this visit:    1. Subluxation of left patella, initial encounter (Primary)    2. Ganglion cyst    3. Primary osteoarthritis of left knee        Discussed the treatment options with the patient, operative vs non-operative. I reviewed the MRI with the patient. Order for physical therapy given today. Plan for conservative treatment at this time.     Call or return  if worsening symptoms.    Follow Up     6 weeks      Patient was given instructions and counseling regarding her condition or for health maintenance advice. Please see specific information pulled into the AVS if appropriate.     Scribed for Selwyn Prather MD by Vida Adhikari.  11/10/23   09:11 EST    I have personally performed the services described in this document as scribed by the above individual and it is both accurate and complete. Selwyn Prather MD 11/12/23

## 2023-11-13 DIAGNOSIS — I10 PRIMARY HYPERTENSION: ICD-10-CM

## 2023-11-14 RX ORDER — HYDROCHLOROTHIAZIDE 25 MG/1
25 TABLET ORAL DAILY
Qty: 90 TABLET | Refills: 3 | Status: SHIPPED | OUTPATIENT
Start: 2023-11-14

## 2023-11-14 RX ORDER — VALSARTAN 80 MG/1
80 TABLET ORAL 2 TIMES DAILY
Qty: 180 TABLET | Refills: 3 | Status: SHIPPED | OUTPATIENT
Start: 2023-11-14

## 2023-11-14 RX ORDER — ZOLPIDEM TARTRATE 12.5 MG/1
12.5 TABLET, FILM COATED, EXTENDED RELEASE ORAL NIGHTLY PRN
Qty: 90 TABLET | Refills: 0 | Status: SHIPPED | OUTPATIENT
Start: 2023-11-14

## 2023-11-28 ENCOUNTER — TREATMENT (OUTPATIENT)
Dept: PHYSICAL THERAPY | Facility: CLINIC | Age: 63
End: 2023-11-28
Payer: MEDICARE

## 2023-11-28 DIAGNOSIS — M62.81 MUSCLE WEAKNESS OF PROXIMAL EXTREMITY: ICD-10-CM

## 2023-11-28 DIAGNOSIS — M25.562 LEFT ANTERIOR KNEE PAIN: Primary | ICD-10-CM

## 2023-11-28 NOTE — PROGRESS NOTES
Physical Therapy Initial Evaluation and Plan of Care      [place] [facility]: [address] [place] [address]      Patient: Janna Rich   : 1960  Diagnosis/ICD-10 Code:  Left anterior knee pain [M25.562]  Referring practitioner: Selwyn Prather MD  Date of Initial Visit: 2023  Today's Date: 2023  Patient seen for 1 sessions           Subjective Questionnaire: LEFS: 56/80      Subjective   Pt reports to physical therapy w/ complaints of L knee pain. Pt reports in , they broke their R foot, and had some xrays ordered for their Knee and ankle. Pt reports they have had some pain and clicking. Pt reports they did receive an injection in their L knee. 2 days after this injection, they have increased pain w/ full ext and with resting their L knee at a 90 degree bend. Pt reports it feels like a toothache, that can become very sharp. Pt reports as the pain increases, it can radiate down to their ankle.     Pt reports they have a separate pain when they are going up stairs, and this is in the front of their L knee. Pt reports if they cross their L knee over their R knee, they can have the same radiating, burning pain, down to their ankle. Pt reports they can walk or stand as much as they want, just cannot sit.       Pt occupation: retired; nurse     Current Pain: 0/10  Best Pain: 0/10  Worst Pain: 10/10  Quality of pain: burning     Aggravating Factors: keeping knee straight for prolonged periods builds this.  Easing Factors: moving out of position of pain., has tried ice and heat, but does not help much     Past Medical Hx: HT (controlled w/ medication), SOB (exertional), MVP, disc rupture in  (did go down L LE). Short fiber neuropathy (decreased sensation in B feet to thighs, this happens in their hands as well).       Objective          Tenderness   Left Knee   Tenderness in the pes anserinus. No tenderness in the fibular head, inferior fat pad, ITB, lateral joint line, LCL  (distal), LCL (proximal), MCL (distal), MCL (proximal) and medial joint line.     Additional Tenderness Details  Pt has local tenderness at L2-L3 w/ central PA testing. All central and unilateral PA testing does not increase burning sensation in L LE.    Neurological Testing     Additional Neurological Details  Pt reports decreased light touch sensation in their L LE, below their knee. Pt attributes this to their neuropathy, as they do have decreased sensation in their R LE as well. This decrease in sensation is L>R.     Active Range of Motion   Left Knee   Normal active range of motion  Flexion: 135 degrees   Extension: 0 degrees     Right Knee   Normal active range of motion  Flexion: 135 degrees   Extension: 0 degrees     Patellar Static Positioning   Left Knee: lateral tilt  Right Knee: lateral tilt    Strength/Myotome Testing     Left Hip   Planes of Motion   Flexion: 4+  Extension: 4+  Abduction: 4-    Right Hip   Planes of Motion   Flexion: 4+  Extension: 4  Abduction: 4    Left Knee   Flexion: 4+  Extension: 5    Right Knee   Flexion: 4+  Extension: 5    Tests       Thoracic   Positive slump.     Lumbar     Left   Negative passive SLR.     Right   Negative passive SLR.     Left Pelvic Girdle/Sacrum   Negative: sacral spring.     Right Pelvic Girdle/Sacrum   Negative: sacral spring.     Left Knee   Positive patella-femoral grind.   Negative anterior drawer, lateral Judit, medial Judit, posterior drawer, valgus stress test at 0 degrees, valgus stress test at 30 degrees, varus stress test at 0 degrees and varus stress test at 30 degrees.     Additional Tests Details  Pt reports some grinding sensation in their L knee w/ testing today. This is not painful.     Ambulation     Comments   Pt does not use an AD. Pt appears safe in ambulation.     Pt has tendency to ADD LEs w/ descending stairs.       See Exercise, Manual, and Modality Logs for complete treatment.       Assessment & Plan        Assessment  Impairments: abnormal muscle firing, activity intolerance, impaired balance, impaired physical strength, lacks appropriate home exercise program and pain with function   Functional limitations: lifting, uncomfortable because of pain, sitting, stooping and unable to perform repetitive tasks   Assessment details: Pt reports to physical therapy w/ complaints of L knee pain and LE burning. Pt presents w/ increased nerve tension, decreased hip strength, and poor knee control w/ stairs. Pt has increased perceived deficits described by LEFS. Pt has some decrease in their anterior knee pain/grinding w/ patellar taping today. Pt was educated on their HEP. Pt was also educated on use of ice for inflammation and swelling. Pt will benefit from skilled physical therapy, to address their current impairments and limitations, in order to improve upon their gait and functional mobility to perform ADLs and daily tasks safely and without restrictions.       Prognosis: good    Goals  Plan Goals: KNEE PROBLEMS:     1. The patient complains of L knee pain.   LTG 1: 12 weeks:  The patient will report a pain rating of 0/10 or better in order to improve  tolerance to activities of daily living and improve sleep quality.    STATUS:  New   STG 1a: 6 weeks:  The patient will report a pain rating of 2/10 or better.    STATUS:  New   TREATMENT:  Therapeutic exercises, manual therapy, aquatic therapy, home exercise   instruction, and modalities as needed for pain to include:  electrical stimulation, moist heat, ice,   ultrasound, and diathermy.    2. The patient demonstrates weakness of the B hip.   LTG 2: 12 weeks:  The patient will demonstrate 5/5 strength for B hip flexion, abduction,  and extension in order to improve knee control in gait.    STATUS:  New   STG 2a: 6 weeks:  The patient will demonstrate 4+/5 strength for B hip flexion, abduction,  and extension.    STATUS:  New   TREATMENT: Therapeutic exercises, manual  therapy, aquatic therapy, home exercise instruction,  and modalities as needed for pain to include:  electrical stimulation, moist heat, ice, and ultrasound    3. The patient has limited strength of the L knee.   LTG 3: 12 weeks: The patient will demonstrate 5 /5 strength for L knee flexion and extension in order to allow patient improved joint stability    STATUS:  New   TREATMENT: Manual therapy, therapeutic exercise, home exercise instruction, aquatic therapy, and modalities as needed to include:  moist heat, electrical stimulation, ultrasound, and ice.       4. Mobility: Walking/Moving Around Functional Limitation     LTG 4: 12 weeks:  The patient will demonstrate 12.5 % limitation by achieving a score of 70/80 on the LEFS.    STATUS:  New   STG 4 a: 6 weeks:  The patient will demonstrate 25 % limitation by achieving a score of 60/80 on the LEFS.      STATUS:  New   TREATMENT:  Manual therapy, therapeutic exercise, home exercise instruction, and modalities as needed to include: moist heat, electrical stimulation, and ultrasound.         PLAN:  Therapy options: will receive skilled therapy services  Planned modality interventions: Cryotherapy, Heat, and TENS  Planned therapy interventions:balance/weight-bearing training, ADL retraining, soft tissue mobilization, strengthening, stretching, therapeutic activities, manual therapy, joint mobilization, home exercise program/patient education, gait training, functional ROM exercises, flexibility, body mechanics training, postural training, and neuromuscular re-education  Frequency: 2x per week  Duration in weeks: 12  Treatment plan discussed with: patient      Visit Diagnoses:    ICD-10-CM ICD-9-CM   1. Left anterior knee pain  M25.562 719.46   2. Muscle weakness of proximal extremity  M62.81 728.87       History # of Personal Factors and/or Comorbidities: MODERATE (1-2)  Examination of Body System(s): # of elements: LOW (1-2)  Clinical Presentation: STABLE   Clinical  Decision Making: LOW       Timed:         Manual Therapy:    4     mins  22719;     Therapeutic Exercise:    23     mins  58955;     Neuromuscular Dalila:    0    mins  74782;    Therapeutic Activity:     0     mins  85721;     Gait Trainin     mins  04326;     Ultrasound:     0     mins  30709;    Ionto                               0    mins   11338  Self Care                       0     mins   93835  Canalith Repos    0     mins 47093      Un-Timed:  Electrical Stimulation:    0     mins  42422 ( );  Dry Needling     0     mins self-pay  Traction     0     mins 85231  Low Eval     23     Mins  43830  Mod Eval     0     Mins  66053  High Eval                       0     Mins  63777  Re-Eval                           0    mins  52572    Timed Treatment:   27   mins   Total Treatment:     50   mins    PT SIGNATURE: Alexi Maldonado PT, DPT    Electronically signed 2023    [place] License: PT - [id number]    Initial Certification  Certification Period: 2023 thru 2024  I certify that the therapy services are furnished while this patient is under my care.  The services outlined above are required by this patient, and will be reviewed every 90 days.     PHYSICIAN: Selwyn Prather MD  NPI: [id number]      DATE:     Please sign and return via fax to [phone]. Thank you, [facility].

## 2023-11-30 ENCOUNTER — TREATMENT (OUTPATIENT)
Dept: PHYSICAL THERAPY | Facility: CLINIC | Age: 63
End: 2023-11-30
Payer: MEDICARE

## 2023-11-30 DIAGNOSIS — M62.81 MUSCLE WEAKNESS OF PROXIMAL EXTREMITY: ICD-10-CM

## 2023-11-30 DIAGNOSIS — M25.562 LEFT ANTERIOR KNEE PAIN: Primary | ICD-10-CM

## 2023-11-30 NOTE — PROGRESS NOTES
Outpatient Physical Therapy  [address] [facility] [address], [place] [address]                            Physical Therapy Daily Treatment Note    Patient: Janna Rich   : 1960  Diagnosis/ICD-10 Code:  Left anterior knee pain [M25.562]  Referring practitioner: Selwyn Prather MD  Date of Initial Visit: Type: THERAPY  Noted: 2023  Today's Date: 2023  Patient seen for 2 sessions           Subjective   Janna Rich reports: that the tape does seem to help the knee. She is still a little tender on the back of the knee as well.     Objective   Minimal popping with Terminal Knee Extensions.    See Exercise, Manual, and Modality Logs for complete treatment.     Assessment/Plan  Janna progressing as evident by decreased overall L knee pain. Pt tolerated exercises well,  minimal popping with TKEs . Pt would benefit from skilled PT to address Range of Motion  and Strength deficits, pain management and any concerns with ADLs.       Progress per Plan of Care         Timed:  Manual Therapy:         mins  44842;  Therapeutic Exercise:    20     mins  22939;     Neuromuscular Dalila:    10    mins  09458;    Therapeutic Activity:          mins  95320;     Gait Training:           mins  43797;    Aquatic Therapy:          mins  82469;       Untimed:  Electrical Stimulation:         mins  85836 ( );  Mechanical Traction:         mins  89419;       Timed Treatment:   30   mins   Total Treatment:     30   mins      Electronically signed:     Jasmyn Garcia PTA  Physical Therapist Assistant  [facility] License #: [id number]

## 2023-12-04 ENCOUNTER — TREATMENT (OUTPATIENT)
Dept: PHYSICAL THERAPY | Facility: CLINIC | Age: 63
End: 2023-12-04
Payer: MEDICARE

## 2023-12-04 DIAGNOSIS — M62.81 MUSCLE WEAKNESS OF PROXIMAL EXTREMITY: ICD-10-CM

## 2023-12-04 DIAGNOSIS — M25.562 LEFT ANTERIOR KNEE PAIN: Primary | ICD-10-CM

## 2023-12-04 PROCEDURE — 97530 THERAPEUTIC ACTIVITIES: CPT | Performed by: PHYSICAL THERAPIST

## 2023-12-04 PROCEDURE — 97110 THERAPEUTIC EXERCISES: CPT | Performed by: PHYSICAL THERAPIST

## 2023-12-04 NOTE — PROGRESS NOTES
Physical Therapy Daily Treatment Note      Patient: Janna Rich   : 1960  Referring practitioner: Selwyn Prather MD  Date of Initial Visit: Type: THERAPY  Noted: 2023  Today's Date: 2023  Patient seen for 3 sessions           Subjective Questionnaire:       Subjective Evaluation    History of Present Illness    Subjective comment: Pt reports no posterior knee pain.  Pt reports short trips don't hurt posterior L knee pain.   Objective   See Exercise, Manual, and Modality Logs for complete treatment.       Assessment & Plan       Assessment  Assessment details: Pt presents her pain is not present this morning but had a lot of L posterior knee pain yesterday after riding in the car going to and around and back from [place].  Pt reported pain with TKE.  Pt will benefit from continued PT.        Visit Diagnoses:    ICD-10-CM ICD-9-CM   1. Left anterior knee pain  M25.562 719.46   2. Muscle weakness of proximal extremity  M62.81 728.87       Progress per Plan of Care and Progress strengthening /stabilization /functional activity           Timed:  Manual Therapy:         mins  29281;  Therapeutic Exercise:    22     mins  48537;     Neuromuscular Dalila:        mins  05183;    Therapeutic Activity:     8     mins  20820;     Gait Training:           mins  59354;     Ultrasound:          mins  25717;    Electrical Stimulation:         mins  72466 ( );  Aquatic Therapy          mins  60462    Untimed:  Electrical Stimulation:         mins  33344 ( );  Mechanical Traction:         mins  08709;     Timed Treatment:   30   mins   Total Treatment:     30   mins    Electronically signed    Nadeen Call PTA  Physical Therapist Assistant    [place] license: [id number]

## 2023-12-06 ENCOUNTER — TREATMENT (OUTPATIENT)
Dept: PHYSICAL THERAPY | Facility: CLINIC | Age: 63
End: 2023-12-06
Payer: MEDICARE

## 2023-12-06 DIAGNOSIS — M62.81 MUSCLE WEAKNESS OF PROXIMAL EXTREMITY: ICD-10-CM

## 2023-12-06 DIAGNOSIS — M25.562 LEFT ANTERIOR KNEE PAIN: Primary | ICD-10-CM

## 2023-12-06 PROCEDURE — 97110 THERAPEUTIC EXERCISES: CPT | Performed by: PHYSICAL THERAPIST

## 2023-12-06 PROCEDURE — 97530 THERAPEUTIC ACTIVITIES: CPT | Performed by: PHYSICAL THERAPIST

## 2023-12-06 NOTE — PROGRESS NOTES
Physical Therapy Daily Treatment Note  [place] [facility] [address] [place] [address]    Patient: Janna Rich   : 1960  Referring practitioner: Selwyn Prather MD  Date of Initial Visit: Type: THERAPY  Noted: 2023  Today's Date: 2023  Patient seen for 4 sessions           Subjective  Janna Rihc reports: she had pain yesterday but admits to kneeling to clean an fabric  headboard. She noted no L knee pain at arrival.       Objective   See Exercise, Manual, and Modality Logs for complete treatment.       Assessment/Plan  Janna showing strength gains, evident in her tolerance of increased resistance and task difficulty during session today. Continue need for therapist directed intervention as outlined to address deficits outlined to attain best possible outcome for L knee c/o.    Visit Diagnoses:    ICD-10-CM ICD-9-CM   1. Left anterior knee pain  M25.562 719.46   2. Muscle weakness of proximal extremity  M62.81 728.87       Progress per Plan of Care and Progress strengthening /stabilization /functional activity           Timed:  Manual Therapy:         mins  07628;  Therapeutic Exercise:    14     mins  15747;     Neuromuscular Dalila:        mins  68029;    Therapeutic Activity:     16     mins  55671;     Gait Training:           mins  29045;     Ultrasound:          mins  58462;    Electrical Stimulation:         mins  59146 ( );  Aquatics  __   mins   90225    Untimed:  Electrical Stimulation:         mins  15071 ( );  Mechanical Traction:         mins  47795;     Timed Treatment:  30    mins   Total Treatment:     30   mins    Electronically Signed:  [place] Soares PTA  Physical Therapist Assistant    [place] [facility] license [id number]

## 2023-12-11 ENCOUNTER — TREATMENT (OUTPATIENT)
Dept: PHYSICAL THERAPY | Facility: CLINIC | Age: 63
End: 2023-12-11
Payer: MEDICARE

## 2023-12-11 DIAGNOSIS — M62.81 MUSCLE WEAKNESS OF PROXIMAL EXTREMITY: ICD-10-CM

## 2023-12-11 DIAGNOSIS — M25.562 LEFT ANTERIOR KNEE PAIN: Primary | ICD-10-CM

## 2023-12-11 PROCEDURE — 97530 THERAPEUTIC ACTIVITIES: CPT | Performed by: PHYSICAL THERAPIST

## 2023-12-11 PROCEDURE — 97110 THERAPEUTIC EXERCISES: CPT | Performed by: PHYSICAL THERAPIST

## 2023-12-11 NOTE — PROGRESS NOTES
Outpatient Physical Therapy                   Physical Therapy Daily Treatment Note    Patient: Janna Rich   : 1960  Diagnosis/ICD-10 Code:  Left anterior knee pain [M25.562]  Referring practitioner: Selwyn Prather MD  Date of Initial Visit: Type: THERAPY  Noted: 2023  Today's Date: 2023  Patient seen for 5 sessions             Subjective   Janna Rich reports: the pain in the back of her knee is getting better but she did drive to [place] yesterday and it really bothered her then. Patient states going up the steps is also painful.     Pain: 0/10 pain, at time of arrival.     Objective     See Exercise, Manual, and Modality Logs for complete treatment.     Assessment/Plan  Janna progressing as evident by decreased overall knee pain. Pt tolerated exercises well,  with increased difficulty with side steps to the left and with TKE's . Pt would benefit from skilled PT to address Range of Motion  and Strength deficits, pain management and any concerns with ADLs.       Progress per Plan of Care      Timed:  Manual Therapy:    4     mins  62620;  Therapeutic Exercise:    14     mins  92545;     Neuromuscular Dalila:    0    mins  03279;    Therapeutic Activity:     8     mins  41645;     Gait Trainin     mins  14730;    Aquatic Therapy:     0     mins  30682;       Untimed:  Electrical Stimulation:    0     mins  93085 ( );  Mechanical Traction:    0     mins  77342;       Timed Treatment:   26   mins   Total Treatment:     28   mins      Electronically signed:   Rosalba Vaughn PTA  Physical Therapist Assistant  [facility] License #: [id number]

## 2023-12-12 RX ORDER — OMEPRAZOLE 40 MG/1
40 CAPSULE, DELAYED RELEASE ORAL DAILY
Qty: 90 CAPSULE | Refills: 1 | Status: SHIPPED | OUTPATIENT
Start: 2023-12-12

## 2023-12-13 ENCOUNTER — TREATMENT (OUTPATIENT)
Dept: PHYSICAL THERAPY | Facility: CLINIC | Age: 63
End: 2023-12-13
Payer: MEDICARE

## 2023-12-13 DIAGNOSIS — M25.562 LEFT ANTERIOR KNEE PAIN: Primary | ICD-10-CM

## 2023-12-13 DIAGNOSIS — M62.81 MUSCLE WEAKNESS OF PROXIMAL EXTREMITY: ICD-10-CM

## 2023-12-13 PROCEDURE — 97530 THERAPEUTIC ACTIVITIES: CPT | Performed by: PHYSICAL THERAPIST

## 2023-12-13 PROCEDURE — 97110 THERAPEUTIC EXERCISES: CPT | Performed by: PHYSICAL THERAPIST

## 2023-12-13 NOTE — PROGRESS NOTES
Physical Therapy Daily Treatment Note  [place] [facility] [address] [place] [address]    Patient: Janna Rich   : 1960  Referring practitioner: Selwyn Prather MD  Date of Initial Visit: Type: THERAPY  Noted: 2023  Today's Date: 2023  Patient seen for 6 sessions           Subjective  Janna Rich reports: she is feeling more of the R lateral thigh pain but has resolution of L posterior knee pain.     Objective   See Exercise, Manual, and Modality Logs for complete treatment.       Assessment/Plan  Crepitus audible and palpable L knee, more so laterally. Ktape seemed to aid her pain control. Continue as outlined to attain return of strength, and functional ability with less pain.    Visit Diagnoses:    ICD-10-CM ICD-9-CM   1. Left anterior knee pain  M25.562 719.46   2. Muscle weakness of proximal extremity  M62.81 728.87       Progress per Plan of Care and Progress strengthening /stabilization /functional activity           Timed:  Manual Therapy:    4     mins  00521;  Therapeutic Exercise:    12     mins  56876;     Neuromuscular Dalila:        mins  26956;    Therapeutic Activity:     15     mins  55507;     Gait Training:           mins  78172;     Ultrasound:          mins  48651;    Electrical Stimulation:         mins  75193 ( );  Aquatics  __   mins   80244    Untimed:  Electrical Stimulation:         mins  63525 ( );  Mechanical Traction:         mins  53456;     Timed Treatment:   31   mins   Total Treatment:     31   mins    Electronically Signed:  [place] Soares PTA  Physical Therapist Assistant    [place] [facility] license [id number]

## 2023-12-18 ENCOUNTER — TREATMENT (OUTPATIENT)
Dept: PHYSICAL THERAPY | Facility: CLINIC | Age: 63
End: 2023-12-18
Payer: MEDICARE

## 2023-12-18 DIAGNOSIS — M25.562 LEFT ANTERIOR KNEE PAIN: Primary | ICD-10-CM

## 2023-12-18 DIAGNOSIS — M62.81 MUSCLE WEAKNESS OF PROXIMAL EXTREMITY: ICD-10-CM

## 2023-12-18 PROCEDURE — 97110 THERAPEUTIC EXERCISES: CPT | Performed by: PHYSICAL THERAPIST

## 2023-12-18 PROCEDURE — 97530 THERAPEUTIC ACTIVITIES: CPT | Performed by: PHYSICAL THERAPIST

## 2023-12-18 NOTE — PROGRESS NOTES
Physical Therapy Daily Treatment Note      Patient: Janna Rich   : 1960  Referring practitioner: Selwyn Prather MD  Date of Initial Visit: Type: THERAPY  Noted: 2023  Today's Date: 2023  Patient seen for 7 sessions           Subjective Questionnaire:       Subjective Evaluation    History of Present Illness    Subjective comment: Pt reports the posterior L knee pain  is gone.  Pt reports she can feel her L knee getting stronger.       Objective   See Exercise, Manual, and Modality Logs for complete treatment.       Assessment & Plan       Assessment  Assessment details: Pt tolerated strengthening well.  Pt tolerated Cybex TKE well with taping.  Pt tolerated strengthening well.  Continue with POC.        Visit Diagnoses:    ICD-10-CM ICD-9-CM   1. Left anterior knee pain  M25.562 719.46   2. Muscle weakness of proximal extremity  M62.81 728.87       Progress per Plan of Care and Progress strengthening /stabilization /functional activity           Timed:  Manual Therapy:    6     mins  55954;  Therapeutic Exercise:    17     mins  72025;     Neuromuscular Dalila:        mins  67550;    Therapeutic Activity:     8     mins  87797;     Gait Training:           mins  98994;     Ultrasound:          mins  21097;    Electrical Stimulation:         mins  06397 ( );  Aquatic Therapy          mins  89289    Untimed:  Electrical Stimulation:         mins  28566 ( );  Mechanical Traction:         mins  74829;     Timed Treatment:   31   mins   Total Treatment:     31   mins    Electronically signed    Nadeen Call PTA  Physical Therapist Assistant    [place] license: [id number]

## 2023-12-20 ENCOUNTER — TRANSCRIBE ORDERS (OUTPATIENT)
Dept: ADMINISTRATIVE | Facility: HOSPITAL | Age: 63
End: 2023-12-20
Payer: MEDICARE

## 2023-12-20 ENCOUNTER — TREATMENT (OUTPATIENT)
Dept: PHYSICAL THERAPY | Facility: CLINIC | Age: 63
End: 2023-12-20
Payer: MEDICARE

## 2023-12-20 DIAGNOSIS — M25.562 LEFT ANTERIOR KNEE PAIN: Primary | ICD-10-CM

## 2023-12-20 DIAGNOSIS — M62.81 MUSCLE WEAKNESS OF PROXIMAL EXTREMITY: ICD-10-CM

## 2023-12-20 DIAGNOSIS — Z12.31 VISIT FOR SCREENING MAMMOGRAM: Primary | ICD-10-CM

## 2023-12-20 NOTE — PROGRESS NOTES
Physical Therapy Daily Treatment Note  [place] [facility] [address] [place] [address]    Patient: Janna Rich   : 1960  Referring practitioner: Selwyn Prather MD  Date of Initial Visit: Type: THERAPY  Noted: 2023  Today's Date: 2023  Patient seen for 8 sessions           Subjective  Janna Rich reports: she does understand she has remaining weakness but reports   she is not experiencing pain. PTA discussed with her the need to schedule additional visits as her upcoming vacation has bee cancelled. She agreed to that and will see MILI Maldonado PT for progress note to outline ongoing needs.     Objective   See Exercise, Manual, and Modality Logs for complete treatment.       Assessment/Plan  Janna tolerated advancement in resistance and task difficulty today. Continued need for therapist directed intervention to return strength and functional ability to best possible outcome.    Visit Diagnoses:    ICD-10-CM ICD-9-CM   1. Left anterior knee pain  M25.562 719.46   2. Muscle weakness of proximal extremity  M62.81 728.87       Progress per Plan of Care and Progress strengthening /stabilization /functional activity           Timed:  Manual Therapy:         mins  66984;  Therapeutic Exercise:    14     mins  29636;     Neuromuscular Dalila:        mins  49482;    Therapeutic Activity:     16     mins  35860;     Gait Training:           mins  16700;     Ultrasound:          mins  30999;    Electrical Stimulation:         mins  05079 ( );  Aquatics  __   mins   97073    Untimed:  Electrical Stimulation:         mins  00469 ( );  Mechanical Traction:         mins  05488;     Timed Treatment:   30   mins   Total Treatment:     30   mins    Electronically Signed:  [place] Soares PTA  Physical Therapist Assistant    [place] [facility] license [id number]

## 2024-01-02 ENCOUNTER — TREATMENT (OUTPATIENT)
Dept: PHYSICAL THERAPY | Facility: CLINIC | Age: 64
End: 2024-01-02
Payer: MEDICARE

## 2024-01-02 DIAGNOSIS — M25.562 LEFT ANTERIOR KNEE PAIN: Primary | ICD-10-CM

## 2024-01-02 DIAGNOSIS — M62.81 MUSCLE WEAKNESS OF PROXIMAL EXTREMITY: ICD-10-CM

## 2024-01-02 NOTE — PROGRESS NOTES
Physical Therapy Daily Treatment Note      Patient: Janna Rich   : 1960  Referring practitioner: Selwyn Prather MD  Date of Initial Visit: Type: THERAPY  Noted: 2023  Today's Date: 2024  Patient seen for 9 sessions           Subjective Questionnaire:       Subjective Evaluation    History of Present Illness    Subjective comment: Pt reports L posterior knee pain is gone and R ankle pain is gone.  Pt reports her legs still feel weak.       Objective   See Exercise, Manual, and Modality Logs for complete treatment.       Assessment & Plan       Assessment  Assessment details: Pt tolerated strengthening well.  Pt had re-eval with PT Michael Maldonado and reports she will have two more visits and revisit discharge.  Continue with POC.        Visit Diagnoses:    ICD-10-CM ICD-9-CM   1. Left anterior knee pain  M25.562 719.46   2. Muscle weakness of proximal extremity  M62.81 728.87       Progress per Plan of Care and Progress strengthening /stabilization /functional activity           Timed:  Manual Therapy:         mins  71906;  Therapeutic Exercise:    12     mins  42961;     Neuromuscular Dalila:        mins  77002;    Therapeutic Activity:     11     mins  54940;     Gait Training:           mins  10988;     Ultrasound:          mins  25625;    Electrical Stimulation:         mins  98700 ( );  Aquatic Therapy          mins  51379    Untimed:  Electrical Stimulation:         mins  72783 ( );  Mechanical Traction:         mins  50990;     Timed Treatment:   23   mins   Total Treatment:     23   mins    Electronically signed    Nadeen Call PTA  Physical Therapist Assistant    [place] license: [id number]

## 2024-01-02 NOTE — PROGRESS NOTES
Progress Note   [facility]: [address] [place] [address]      Patient: Janna Rich   : 1960  Diagnosis/ICD-10 Code:  Left anterior knee pain [M25.562]  Referring practitioner: Selwyn Prather MD  Date of Initial Visit: Type: THERAPY  Noted: 2023  Today's Date: 2024  Patient seen for 9 sessions      Subjective:   Subjective Questionnaire: LEFS: 64/80  Clinical Progress: improved  Home Program Compliance: Yes  Treatment has included: balance/weight-bearing training, ADL retraining, soft tissue mobilization, strengthening, stretching, therapeutic activities, manual therapy, joint mobilization, home exercise program/patient education, gait training, functional ROM exercises, flexibility, body mechanics training, postural training, and neuromuscular re-education    Subjective   Pt reports the L knee pain on the posterior aspect of their knee is now gone and they have no R foot pain. Pt reports they have managed their anterior knee pain w/ taping and w/ some of the exs. Pt reports they do at times have some trouble w/ ascending stairs. Pt reports they are going to try to work on going to the gym, as they do feel like their LEs are still weak.       Objective     Strength/Myotome Testing      Left Hip   Planes of Motion   Flexion: 4+  Extension: 5  Abduction: 4+     Right Hip   Planes of Motion   Flexion: 4+  Extension: 5  Abduction: 4+     Left Knee   Flexion: 5  Extension: 5     Right Knee   Flexion: 5  Extension: 5      See Exercise, Manual, and Modality Logs for complete treatment.     Assessment/Plan  Impairments:impaired balance, impaired physical strength  Functional limitations: lifting, uncomfortable because of pain, sitting, stooping and unable to perform repetitive tasks     Pt reports to physical therapy w/ complaints of L knee pain and LE burning. Pt at this time has decreased overall LE pain. Pt has improvement in their strength as well as w/ their perceived deficits  described by [facility]. Discussed w/ pt about transitioning from therapy to a gym program. Pt is agreeable to this. Pt's goals will be addressed at this time to reflect their progress in therapy. Pt will continue to benefit from skilled therapy to further strengthen their LEs for improvement in knee control and to transition to a gym program.     Goals  Plan Goals: KNEE PROBLEMS:      1. The patient complains of L knee pain.              LTG 1: 12 weeks:  The patient will report a pain rating of 0/10 or better in order to improve  tolerance to activities of daily living and improve sleep quality.                          STATUS: met              STG 1a: 6 weeks:  The patient will report a pain rating of 2/10 or better.                          STATUS:  met              TREATMENT:  Therapeutic exercises, manual therapy, aquatic therapy, home exercise   instruction, and modalities as needed for pain to include:  electrical stimulation, moist heat, ice,   ultrasound, and diathermy.     2. The patient demonstrates weakness of the B hip.              LTG 2: 12 weeks:  The patient will demonstrate 5/5 strength for B hip flexion, abduction,  and extension in order to improve knee control in gait.                          STATUS: progressing              STG 2a: 6 weeks:  The patient will demonstrate 4+/5 strength for B hip flexion, abduction,  and extension.                          STATUS: met              TREATMENT: Therapeutic exercises, manual therapy, aquatic therapy, home exercise instruction,  and modalities as needed for pain to include:  electrical stimulation, moist heat, ice, and ultrasound     3. The patient has limited strength of the L knee.              LTG 3: 12 weeks: The patient will demonstrate 5 /5 strength for L knee flexion and extension in order to allow patient improved joint stability                          STATUS:  met              TREATMENT: Manual therapy, therapeutic exercise, home exercise  instruction, aquatic therapy, and modalities as needed to include:  moist heat, electrical stimulation, ultrasound, and ice.                   4. Mobility: Walking/Moving Around Functional Limitation                               LTG 4: 12 weeks:  The patient will demonstrate 12.5 % limitation by achieving a score of 70/80 on the LEFS.                          STATUS:  progressing              STG 4 a: 6 weeks:  The patient will demonstrate 25 % limitation by achieving a score of 60/80 on the LEFS.                            STATUS:  met              TREATMENT:  Manual therapy, therapeutic exercise, home exercise instruction, and modalities as needed to include: moist heat, electrical stimulation, and ultrasound.       Progress toward previous goals: Partially Met      Recommendations: Continue as planned  Timeframe: 2 a week, for 1 months   Prognosis to achieve goals: good    PT Signature: Alexi Maldonado PT, DPT    Electronically signed 2024    [place] License: PT - [id number]       Timed:  Manual Therapy:    0     mins  74664;  Therapeutic Exercise:    10     mins  84892;     Neuromuscular Dalila:    0    mins  39689;    Therapeutic Activity:     0     mins  95995;     Gait Trainin     mins  38424;     Aquatics                         0      mins  86582    Un-timed:  Mechanical Traction      0     mins  54686  Dry Needling     0     mins self-pay  Electrical Stimulation:    0     mins  35289 ( );    Timed Treatment:   10   mins   Total Treatment:     10   mins

## 2024-01-04 ENCOUNTER — TREATMENT (OUTPATIENT)
Dept: PHYSICAL THERAPY | Facility: CLINIC | Age: 64
End: 2024-01-04
Payer: MEDICARE

## 2024-01-04 DIAGNOSIS — M25.562 LEFT ANTERIOR KNEE PAIN: Primary | ICD-10-CM

## 2024-01-04 DIAGNOSIS — M62.81 MUSCLE WEAKNESS OF PROXIMAL EXTREMITY: ICD-10-CM

## 2024-01-04 NOTE — PROGRESS NOTES
Physical Therapy Daily Treatment Note      Patient: Janna Rich   : 1960  Referring practitioner: Selwyn Prather MD  Date of Initial Visit: Type: THERAPY  Noted: 2023  Today's Date: 2024  Patient seen for 10 sessions           Subjective Questionnaire:       Subjective Evaluation    History of Present Illness    Subjective comment: Pt reports having no knee or foot pain.  Pt reports she has not gone to the gym yet.       Objective   See Exercise, Manual, and Modality Logs for complete treatment.       Assessment & Plan       Assessment  Assessment details: Pt tolerated session well without report of increased pain or discomfort.  We discussed checking out the gym.  Pt will meed with PT Michael Maldonado next visit and discuss further tx.          Visit Diagnoses:    ICD-10-CM ICD-9-CM   1. Left anterior knee pain  M25.562 719.46   2. Muscle weakness of proximal extremity  M62.81 728.87       Progress per Plan of Care and Progress strengthening /stabilization /functional activity           Timed:  Manual Therapy:         mins  02351;  Therapeutic Exercise:    32     mins  79055;     Neuromuscular Dalila:        mins  06567;    Therapeutic Activity:     8     mins  06712;     Gait Training:           mins  81302;     Ultrasound:          mins  65778;    Electrical Stimulation:         mins  49555 ( );  Aquatic Therapy          mins  58600    Untimed:  Electrical Stimulation:         mins  11871 ( );  Mechanical Traction:         mins  26702;     Timed Treatment:   40   mins   Total Treatment:     40   mins    Electronically signed    Nadeen Call PTA  Physical Therapist Assistant    [place] license: [id number]

## 2024-01-09 ENCOUNTER — TREATMENT (OUTPATIENT)
Dept: PHYSICAL THERAPY | Facility: CLINIC | Age: 64
End: 2024-01-09
Payer: MEDICARE

## 2024-01-09 DIAGNOSIS — M25.562 LEFT ANTERIOR KNEE PAIN: Primary | ICD-10-CM

## 2024-01-09 DIAGNOSIS — M62.81 MUSCLE WEAKNESS OF PROXIMAL EXTREMITY: ICD-10-CM

## 2024-01-09 NOTE — PROGRESS NOTES
Physical Therapy Daily Treatment Note/Discharge Summary   [facility]: [address] [place] [address]      Patient: Janna Rich   : 1960  Diagnosis/ICD-10 Code:  Left anterior knee pain [M25.562]  Referring practitioner: Selwyn Prather MD  Date of Initial Visit: Type: THERAPY  Noted: 2023  Today's Date: 2024  Patient seen for 11 sessions           Subjective   The patient reported they are a little achy today. Pt attributes this to arthritis and the weather. Pt reports they have applied for a gym membership and believe they can progress independently.     Objective   See Exercise, Manual, and Modality Logs for complete treatment.     Assessment/Plan  Pt tolerates therapy session today. Discussed gym program and intensity. Pt's goals will be addressed to reflect their progress in therapy. Pt will be discharged to return to the gym.        Goals  Plan Goals: KNEE PROBLEMS:      1. The patient complains of L knee pain.              LTG 1: 12 weeks:  The patient will report a pain rating of 0/10 or better in order to improve  tolerance to activities of daily living and improve sleep quality.                          STATUS: met              STG 1a: 6 weeks:  The patient will report a pain rating of 2/10 or better.                          STATUS:  met              TREATMENT:  Therapeutic exercises, manual therapy, aquatic therapy, home exercise   instruction, and modalities as needed for pain to include:  electrical stimulation, moist heat, ice,   ultrasound, and diathermy.     2. The patient demonstrates weakness of the B hip.              LTG 2: 12 weeks:  The patient will demonstrate 5/5 strength for B hip flexion, abduction,  and extension in order to improve knee control in gait.                          STATUS: sufficiently met              STG 2a: 6 weeks:  The patient will demonstrate 4+/5 strength for B hip flexion, abduction,  and extension.                           STATUS: met              TREATMENT: Therapeutic exercises, manual therapy, aquatic therapy, home exercise instruction,  and modalities as needed for pain to include:  electrical stimulation, moist heat, ice, and ultrasound     3. The patient has limited strength of the L knee.              LTG 3: 12 weeks: The patient will demonstrate 5 /5 strength for L knee flexion and extension in order to allow patient improved joint stability                          STATUS:  met              TREATMENT: Manual therapy, therapeutic exercise, home exercise instruction, aquatic therapy, and modalities as needed to include:  moist heat, electrical stimulation, ultrasound, and ice.                   4. Mobility: Walking/Moving Around Functional Limitation                               LTG 4: 12 weeks:  The patient will demonstrate 12.5 % limitation by achieving a score of 70/80 on the LEFS.                          STATUS: not met              STG 4 a: 6 weeks:  The patient will demonstrate 25 % limitation by achieving a score of 60/80 on the LEFS.                            STATUS:  met              TREATMENT:  Manual therapy, therapeutic exercise, home exercise instruction, and modalities as needed to include: moist heat, electrical stimulation, and ultrasound.       Goals partially met. Return to gym at this time.     Timed:  Manual Therapy:    0     mins  83296;  Therapeutic Exercise:    15     mins  65449;     Neuromuscular Dalila:   0    mins  40559;    Therapeutic Activity:     10     mins  16462;     Gait Trainin     mins  80130;     Aquatics                         0      mins  85001    Un-timed:  Mechanical Traction      0     mins  84346  Electrical Stimulation:    0     mins  80912 ( );      Timed Treatment:   25   mins   Total Treatment:     25   mins    Alexi Maldonado PT DPT    Electronically signed 2024    [place] License: PT - [id number]

## 2024-01-23 ENCOUNTER — OFFICE VISIT (OUTPATIENT)
Dept: ORTHOPEDIC SURGERY | Facility: CLINIC | Age: 64
End: 2024-01-23
Payer: MEDICARE

## 2024-01-23 VITALS
HEIGHT: 60 IN | SYSTOLIC BLOOD PRESSURE: 124 MMHG | BODY MASS INDEX: 26.9 KG/M2 | WEIGHT: 137 LBS | HEART RATE: 60 BPM | OXYGEN SATURATION: 97 % | DIASTOLIC BLOOD PRESSURE: 81 MMHG

## 2024-01-23 DIAGNOSIS — M17.12 PRIMARY OSTEOARTHRITIS OF LEFT KNEE: Primary | ICD-10-CM

## 2024-01-23 DIAGNOSIS — M67.40 GANGLION CYST: ICD-10-CM

## 2024-01-23 DIAGNOSIS — S83.002A SUBLUXATION OF LEFT PATELLA, INITIAL ENCOUNTER: ICD-10-CM

## 2024-01-23 PROCEDURE — 99213 OFFICE O/P EST LOW 20 MIN: CPT | Performed by: ORTHOPAEDIC SURGERY

## 2024-01-23 PROCEDURE — 3079F DIAST BP 80-89 MM HG: CPT | Performed by: ORTHOPAEDIC SURGERY

## 2024-01-23 PROCEDURE — 3074F SYST BP LT 130 MM HG: CPT | Performed by: ORTHOPAEDIC SURGERY

## 2024-01-23 NOTE — PROGRESS NOTES
"Chief Complaint  Follow-up of the Left Knee     Subjective      Janna Rich presents to [facility] for follow up evaluation of the left knee. The patient has been treating her patella subluxation, osteoarthritis and ganglion cyst conservatively. She has been attending therapy with some relief. She denies any pain today.     Allergies   Allergen Reactions    Codeine Itching    Morphine Swelling    Ciprofloxacin Rash        Social History     Socioeconomic History    Marital status:    Tobacco Use    Smoking status: Never    Smokeless tobacco: Never   Vaping Use    Vaping Use: Never used   Substance and Sexual Activity    Alcohol use: Yes     Comment: light 04/24/2018 - 1-2 beers month    Drug use: Never     Comment: denies substance abuse    Sexual activity: Yes     Partners: Male        I reviewed the patient's chief complaint, history of present illness, review of systems, past medical history, surgical history, family history, social history, medications, and allergy list.     Review of Systems     Constitutional: Denies fevers, chills, weight loss  Cardiovascular: Denies chest pain, shortness of breath  Skin: Denies rashes, acute skin changes  Neurologic: Denies headache, loss of consciousness  MSK: Left knee pain      Vital Signs:   /81   Pulse 60   Ht 152.4 cm (60\")   Wt 62.1 kg (137 lb)   SpO2 97%   BMI 26.76 kg/m²          Physical Exam  General: Alert. No acute distress    Ortho Exam      Left knee- Positive EHL, FHL, GS and TA. Sensation intact to all 5 nerves of the foot. Positive pulses. Stable to varus/valgus stress. Stable to anterior/posterior drawer. ROM 0-135 degrees. knee Extensor Mechanism  intact. Negative Lachman's. Negative Judit's. Positive crepitus.     Procedures      Imaging Results (Most Recent)       None             Result Review :       No results found.           Assessment and Plan     Diagnoses and all orders for this visit:    1. " Primary osteoarthritis of left knee (Primary)    2. Subluxation of left patella, initial encounter    3. Ganglion cyst        Discussed the treatment plan with the patient.  Plan for activity as tolerated. Home exercises reviewed.     Call or return if worsening symptoms.    Follow Up     PRN      Patient was given instructions and counseling regarding her condition or for health maintenance advice. Please see specific information pulled into the AVS if appropriate.     Scribed for Selwyn Prather MD by Vida Adhikari.  01/23/24   08:18 EST    I have personally performed the services described in this document as scribed by the above individual and it is both accurate and complete. Selwyn Prather MD 01/23/24

## 2024-02-06 DIAGNOSIS — E03.9 ACQUIRED HYPOTHYROIDISM: ICD-10-CM

## 2024-02-06 DIAGNOSIS — Z13.6 SCREENING FOR CARDIOVASCULAR CONDITION: ICD-10-CM

## 2024-02-06 DIAGNOSIS — I10 PRIMARY HYPERTENSION: Primary | ICD-10-CM

## 2024-02-06 RX ORDER — ZOLPIDEM TARTRATE 10 MG/1
10 TABLET ORAL NIGHTLY PRN
Qty: 90 TABLET | Refills: 0 | Status: SHIPPED | OUTPATIENT
Start: 2024-02-06

## 2024-02-15 ENCOUNTER — HOSPITAL ENCOUNTER (OUTPATIENT)
Dept: MAMMOGRAPHY | Facility: HOSPITAL | Age: 64
Discharge: HOME | End: 2024-02-15
Admitting: NURSE PRACTITIONER
Payer: MEDICARE

## 2024-02-15 DIAGNOSIS — Z12.31 VISIT FOR SCREENING MAMMOGRAM: ICD-10-CM

## 2024-02-15 PROCEDURE — 77067 SCR MAMMO BI INCL CAD: CPT

## 2024-02-15 PROCEDURE — 77063 BREAST TOMOSYNTHESIS BI: CPT

## 2024-02-21 ENCOUNTER — LAB (OUTPATIENT)
Dept: LAB | Facility: HOSPITAL | Age: 64
End: 2024-02-21
Payer: MEDICARE

## 2024-02-21 DIAGNOSIS — Z13.6 SCREENING FOR CARDIOVASCULAR CONDITION: ICD-10-CM

## 2024-02-21 DIAGNOSIS — I10 PRIMARY HYPERTENSION: ICD-10-CM

## 2024-02-21 DIAGNOSIS — E03.9 ACQUIRED HYPOTHYROIDISM: ICD-10-CM

## 2024-02-21 LAB
ALBUMIN SERPL-MCNC: 4.4 G/DL (ref 3.5–5.2)
ALBUMIN/GLOB SERPL: 1.8 G/DL
ALP SERPL-CCNC: 72 U/L (ref 39–117)
ALT SERPL W P-5'-P-CCNC: 17 U/L (ref 1–33)
ANION GAP SERPL CALCULATED.3IONS-SCNC: 9.9 MMOL/L (ref 5–15)
AST SERPL-CCNC: 20 U/L (ref 1–32)
BILIRUB SERPL-MCNC: 0.6 MG/DL (ref 0–1.2)
BUN SERPL-MCNC: 18 MG/DL (ref 8–23)
BUN/CREAT SERPL: 17.8 (ref 7–25)
CALCIUM SPEC-SCNC: 9.6 MG/DL (ref 8.6–10.5)
CHLORIDE SERPL-SCNC: 98 MMOL/L (ref 98–107)
CHOLEST SERPL-MCNC: 207 MG/DL (ref 0–200)
CO2 SERPL-SCNC: 29.1 MMOL/L (ref 22–29)
CREAT SERPL-MCNC: 1.01 MG/DL (ref 0.57–1)
DEPRECATED RDW RBC AUTO: 41.1 FL (ref 37–54)
EGFRCR SERPLBLD CKD-EPI 2021: 61.9 ML/MIN/1.73
ERYTHROCYTE [DISTWIDTH] IN BLOOD BY AUTOMATED COUNT: 12.7 % (ref 12.3–15.4)
GLOBULIN UR ELPH-MCNC: 2.4 GM/DL
GLUCOSE SERPL-MCNC: 98 MG/DL (ref 65–99)
HCT VFR BLD AUTO: 39.5 % (ref 34–46.6)
HDLC SERPL-MCNC: 48 MG/DL (ref 40–60)
HGB BLD-MCNC: 13.4 G/DL (ref 12–15.9)
LDLC SERPL CALC-MCNC: 139 MG/DL (ref 0–100)
LDLC/HDLC SERPL: 2.84 {RATIO}
MCH RBC QN AUTO: 30.3 PG (ref 26.6–33)
MCHC RBC AUTO-ENTMCNC: 33.9 G/DL (ref 31.5–35.7)
MCV RBC AUTO: 89.4 FL (ref 79–97)
PLATELET # BLD AUTO: 276 10*3/MM3 (ref 140–450)
PMV BLD AUTO: 11.4 FL (ref 6–12)
POTASSIUM SERPL-SCNC: 4.1 MMOL/L (ref 3.5–5.2)
PROT SERPL-MCNC: 6.8 G/DL (ref 6–8.5)
RBC # BLD AUTO: 4.42 10*6/MM3 (ref 3.77–5.28)
SODIUM SERPL-SCNC: 137 MMOL/L (ref 136–145)
T4 FREE SERPL-MCNC: 1.67 NG/DL (ref 0.93–1.7)
TRIGL SERPL-MCNC: 114 MG/DL (ref 0–150)
TSH SERPL DL<=0.05 MIU/L-ACNC: 0.21 UIU/ML (ref 0.27–4.2)
VLDLC SERPL-MCNC: 20 MG/DL (ref 5–40)
WBC NRBC COR # BLD AUTO: 3.86 10*3/MM3 (ref 3.4–10.8)

## 2024-02-21 PROCEDURE — 80061 LIPID PANEL: CPT

## 2024-02-21 PROCEDURE — 36415 COLL VENOUS BLD VENIPUNCTURE: CPT

## 2024-02-21 PROCEDURE — 80053 COMPREHEN METABOLIC PANEL: CPT

## 2024-02-21 PROCEDURE — 84439 ASSAY OF FREE THYROXINE: CPT

## 2024-02-21 PROCEDURE — 85027 COMPLETE CBC AUTOMATED: CPT

## 2024-02-21 PROCEDURE — 84443 ASSAY THYROID STIM HORMONE: CPT

## 2024-02-23 DIAGNOSIS — E03.9 ACQUIRED HYPOTHYROIDISM: ICD-10-CM

## 2024-02-23 RX ORDER — LEVOTHYROXINE SODIUM 0.12 MG/1
125 TABLET ORAL DAILY
Qty: 90 TABLET | Refills: 1 | Status: SHIPPED | OUTPATIENT
Start: 2024-02-23 | End: 2024-02-26 | Stop reason: SDUPTHER

## 2024-02-26 ENCOUNTER — OFFICE VISIT (OUTPATIENT)
Dept: FAMILY MEDICINE CLINIC | Facility: CLINIC | Age: 64
End: 2024-02-26
Payer: MEDICARE

## 2024-02-26 VITALS
TEMPERATURE: 96.6 F | SYSTOLIC BLOOD PRESSURE: 112 MMHG | DIASTOLIC BLOOD PRESSURE: 66 MMHG | HEART RATE: 55 BPM | BODY MASS INDEX: 28.43 KG/M2 | HEIGHT: 60 IN | WEIGHT: 144.8 LBS | OXYGEN SATURATION: 97 %

## 2024-02-26 DIAGNOSIS — E03.9 ACQUIRED HYPOTHYROIDISM: ICD-10-CM

## 2024-02-26 DIAGNOSIS — E78.5 HYPERLIPIDEMIA, UNSPECIFIED HYPERLIPIDEMIA TYPE: ICD-10-CM

## 2024-02-26 DIAGNOSIS — Z12.11 SCREENING FOR COLON CANCER: Primary | ICD-10-CM

## 2024-02-26 DIAGNOSIS — L82.1 SEBORRHEIC KERATOSIS: ICD-10-CM

## 2024-02-26 DIAGNOSIS — Z00.00 WELCOME TO MEDICARE PREVENTIVE VISIT: ICD-10-CM

## 2024-02-26 RX ORDER — ROSUVASTATIN CALCIUM 10 MG/1
10 TABLET, COATED ORAL DAILY
Qty: 90 TABLET | Refills: 1 | Status: SHIPPED | OUTPATIENT
Start: 2024-02-26

## 2024-02-26 RX ORDER — LEVOTHYROXINE SODIUM 0.12 MG/1
125 TABLET ORAL DAILY
Start: 2024-02-26

## 2024-02-26 NOTE — PROGRESS NOTES
The ABCs of the Annual Wellness Visit  [place] to Medicare Visit    Subjective     Janna Rich is a 63 y.o. female who presents for a  Welcome to Medicare Visit.    The following portions of the patient's history were reviewed and   updated as appropriate: allergies, current medications, past family history, past medical history, past social history, past surgical history, and problem list.     Compared to one year ago, the patient feels her physical   health is better.    Compared to one year ago, the patient feels her mental   health is better.    Recent Hospitalizations:  She was not admitted to the hospital during the last year.       Current Medical Providers:  Patient Care Team:  Shan De La Garza APRN as PCP - General (Nurse Practitioner)    Outpatient Medications Prior to Visit   Medication Sig Dispense Refill    desvenlafaxine (PRISTIQ) 50 MG 24 hr tablet TAKE 1 TABLET DAILY 90 tablet 3    dicyclomine (BENTYL) 20 MG tablet Take 1 tablet by mouth 2 (Two) Times a Day As Needed (cramping/diarrhea). 180 tablet 0    hydroCHLOROthiazide (HYDRODIURIL) 25 MG tablet TAKE 1 TABLET DAILY 90 tablet 3    metoprolol tartrate (LOPRESSOR) 25 MG tablet TAKE 1 TABLET TWICE A  tablet 3    nitrofurantoin (MACRODANTIN) 50 MG capsule Take 1 capsule by mouth 4 (Four) Times a Day. 30 capsule 1    omeprazole (priLOSEC) 40 MG capsule Take 1 capsule by mouth Daily. 90 capsule 1    valACYclovir (Valtrex) 500 MG tablet Take 1 tablet By Mouth once a day 10 tablet 2    valsartan (DIOVAN) 80 MG tablet TAKE 1 TABLET TWICE A  tablet 3    zolpidem (AMBIEN) 10 MG tablet Take 1 tablet by mouth At Night As Needed for Sleep. 90 tablet 0    levothyroxine (SYNTHROID, LEVOTHROID) 125 MCG tablet Take 1 tablet by mouth Daily. 90 tablet 1    buPROPion XL (WELLBUTRIN XL) 150 MG 24 hr tablet Take 1 tablet by mouth Every Morning. 90 tablet 1    traMADol (ULTRAM) 50 MG tablet Take 1 tablet by mouth Every 6 (Six) Hours As Needed for Moderate  "Pain. 30 tablet 0     No facility-administered medications prior to visit.       No opioid medication identified on active medication list. I have reviewed chart for other potential  high risk medication/s and harmful drug interactions in the elderly.        Aspirin is not on active medication list.  Aspirin use is not indicated based on review of current medical condition/s. Risk of harm outweighs potential benefits.  .    Patient Active Problem List   Diagnosis    Anxiety    Arthritis    Carpal tunnel syndrome of left wrist    Cervical disc herniation    Cervical radiculopathy    Cold sore    Depression    Disorder of shoulder    Facial aging    Gastric reflux    Finger numbness    Hand numbness    Hand weakness    Heart murmur    HTN (hypertension)    Low back pain    Migraine    Muscle atrophy of upper extremity    MVP (mitral valve prolapse)    Neck pain    Decreased sensation of lower extremity    Paresthesia    Seasonal allergic rhinitis    Snapping thumb syndrome    Subacromial impingement, left    Tremor    Sprain of metacarpophalangeal joint of right index finger    Avulsion fracture 5th metatarsal, left    Index Finger pain, right    Closed nondisplaced fracture of fifth right metatarsal bone     Advance Care Planning   Advance Care Planning     Advance Directive is on file.  ACP discussion was held with the patient during this visit. Patient has an advance directive in EMR which is still valid.        Objective   Vitals:    02/26/24 1340   BP: 112/66   BP Location: Right arm   Patient Position: Sitting   Cuff Size: Adult   Pulse: 55   Temp: 96.6 °F (35.9 °C)   TempSrc: Temporal   SpO2: 97%   Weight: 65.7 kg (144 lb 12.8 oz)   Height: 152.4 cm (60\")   PainSc: 0-No pain     Estimated body mass index is 28.28 kg/m² as calculated from the following:    Height as of this encounter: 152.4 cm (60\").    Weight as of this encounter: 65.7 kg (144 lb 12.8 oz).           Does the patient have evidence of cognitive " impairment?   No    Lab Results   Component Value Date    TRIG 114 2024    HDL 48 2024     (H) 2024    VLDL 20 2024       Procedures       HEALTH RISK ASSESSMENT    Smoking Status:  Social History     Tobacco Use   Smoking Status Never   Smokeless Tobacco Never     Alcohol Consumption:  Social History     Substance and Sexual Activity   Alcohol Use Yes    Alcohol/week: 2.0 standard drinks of alcohol    Types: 1 Cans of beer, 1 Drinks containing 0.5 oz of alcohol per week    Comment: light 2018 - 1-2 beers month       Fall Risk Screen:    PEPPER Fall Risk Assessment was completed, and patient is at LOW risk for falls.Assessment completed on:2024    Depression Screen:       2024     1:41 PM   PHQ-2/PHQ-9 Depression Screening   Little Interest or Pleasure in Doing Things 1-->several days   Feeling Down, Depressed or Hopeless 0-->not at all   PHQ-9: Brief Depression Severity Measure Score 1       Health Habits and Functional and Cognitive Screenin/19/2024     9:28 AM   Functional & Cognitive Status   Do you have difficulty preparing food and eating? No   Do you have difficulty bathing yourself, getting dressed or grooming yourself? No   Do you have difficulty using the toilet? No   Do you have difficulty moving around from place to place? No   Do you have trouble with steps or getting out of a bed or a chair? No   Current Diet Well Balanced Diet   Dental Exam Up to date   Eye Exam Up to date   Exercise (times per week) 4 times per week   Current Exercises Include Light Weights;Walking   Do you need help using the phone?  No   Are you deaf or do you have serious difficulty hearing?  No   Do you need help to go to places out of walking distance? No   Do you need help shopping? No   Do you need help preparing meals?  No   Do you need help with housework?  No   Do you need help with laundry? No   Do you need help taking your medications? No   Do you need help managing  money? No   Do you ever drive or ride in a car without wearing a seat belt? No   Have you felt unusual stress, anger or loneliness in the last month? No   Who do you live with? Spouse   If you need help, do you have trouble finding someone available to you? No   Have you been bothered in the last four weeks by sexual problems? No   Do you have difficulty concentrating, remembering or making decisions? No       Visual Acuity:    Vision Screening    Right eye Left eye Both eyes   Without correction      With correction 20/20 20/20 20/15       Age-appropriate Screening Schedule:  Refer to the list below for future screening recommendations based on patient's age, sex and/or medical conditions. Orders for these recommended tests are listed in the plan section. The patient has been provided with a written plan.    Health Maintenance   Topic Date Due    DXA SCAN  Never done    Pneumococcal Vaccine 65+ (1 of 2 - PCV) Never done    ZOSTER VACCINE (1 of 2) Never done    RSV Vaccine - Adults (1 - 1-dose 60+ series) Never done    HEPATITIS C SCREENING  Never done    COLORECTAL CANCER SCREENING  02/11/2024    COVID-19 Vaccine (4 - 2023-24 season) 02/28/2024 (Originally 9/1/2023)    BMI FOLLOWUP  11/10/2024    LIPID PANEL  02/21/2025    ANNUAL WELLNESS VISIT  02/26/2025    MAMMOGRAM  02/15/2026    TDAP/TD VACCINES (2 - Td or Tdap) 06/01/2026    INFLUENZA VACCINE  Completed        CMS Preventative Services Quick Reference  Risk Factors Identified During Encounter    None Identified  The above risks/problems have been discussed with the patient.  Pertinent information has been shared with the patient in the After Visit Summary.    Follow Up:   Initial Medicare Visit in one year    An After Visit Summary and PPPS were made available to the patient.      Additional E&M Note during same encounter follows:  Patient has multiple medical problems which are significant and separately identifiable that require additional work above and  "beyond the Medicare Wellness Visit.      Chief Complaint  Welcome To Medicare    Subjective        HPI  Janna Rich is also being seen today for follow-up.  I did review recent lab work with the patient.  Her levothyroxine was recently adjusted due to her low TSH.  Patient's cholesterol levels were slightly elevated 2.  She did Antley be evaluated by an ophthalmologist and they had noted a thin layer of cholesterol in her eye.  I did discuss starting her on a low-dose statin to lower her risk.  Patient denies any other concerns or complaints at this time.         Objective   Vital Signs:  /66 (BP Location: Right arm, Patient Position: Sitting, Cuff Size: Adult)   Pulse 55   Temp 96.6 °F (35.9 °C) (Temporal)   Ht 152.4 cm (60\")   Wt 65.7 kg (144 lb 12.8 oz)   SpO2 97%   BMI 28.28 kg/m²     Physical Exam  Vitals reviewed.   Constitutional:       Appearance: Normal appearance.   Cardiovascular:      Rate and Rhythm: Normal rate and regular rhythm.      Pulses: Normal pulses.      Heart sounds: Normal heart sounds, S1 normal and S2 normal. No murmur heard.  Pulmonary:      Effort: Pulmonary effort is normal. No respiratory distress.      Breath sounds: Normal breath sounds.   Chest:          Comments: 4 mm round seborrheic keratosis noted to the left breast.  No erythema or drainage.  Skin:     General: Skin is warm and dry.   Neurological:      Mental Status: She is alert and oriented to person, place, and time.   Psychiatric:         Attention and Perception: Attention normal.         Mood and Affect: Mood normal.         Behavior: Behavior normal.                     Assessment and Plan   Diagnoses and all orders for this visit:    1. Screening for colon cancer (Primary)  -     Cologuard - Stool, Per Rectum; Future    2. Hyperlipidemia, unspecified hyperlipidemia type  -     rosuvastatin (Crestor) 10 MG tablet; Take 1 tablet by mouth Daily.  Dispense: 90 tablet; Refill: 1    3. Acquired " hypothyroidism  -     levothyroxine (SYNTHROID, LEVOTHROID) 125 MCG tablet; Take 1 tablet by mouth Daily.    4. Seborrheic keratosis    5. Welcome to Medicare preventive visit             Follow Up   Return in about 6 months (around 8/26/2024) for Recheck.  Patient was given instructions and counseling regarding her condition or for health maintenance advice. Please see specific information pulled into the AVS if appropriate.

## 2024-04-15 ENCOUNTER — TELEPHONE (OUTPATIENT)
Dept: FAMILY MEDICINE CLINIC | Facility: CLINIC | Age: 64
End: 2024-04-15
Payer: MEDICARE

## 2024-04-15 DIAGNOSIS — R50.9 FEVER, UNSPECIFIED FEVER CAUSE: Primary | ICD-10-CM

## 2024-04-15 RX ORDER — NITROFURANTOIN MACROCRYSTALS 50 MG/1
50 CAPSULE ORAL 4 TIMES DAILY
Qty: 30 CAPSULE | Refills: 1 | Status: SHIPPED | OUTPATIENT
Start: 2024-04-15

## 2024-04-15 NOTE — TELEPHONE ENCOUNTER
Caller: Janna Rich    Relationship: Self    Best call back number: [phone]     What orders are you requesting (i.e. lab or imaging): LAB WORK TO CHECK FOR UTI    In what timeframe would the patient need to come in: ASAP    Where will you receive your lab/imaging services: [facility]    Additional notes: PATIENT STATES SHE HAS A FEVER, BACK PAIN, AND ODOROUS URINE SO SHE THINKS SHE HAS A UTI.

## 2024-04-15 NOTE — TELEPHONE ENCOUNTER
Caller: Janna Rcih    Relationship: Self    Best call back number: [phone]    Requested Prescriptions:   Requested Prescriptions     Pending Prescriptions Disp Refills    nitrofurantoin (MACRODANTIN) 50 MG capsule 30 capsule 1     Sig: Take 1 capsule by mouth 4 (Four) Times a Day.        Pharmacy where request should be sent: [facility] [phone]  [place], [place] - [address] [facility] [phone] [facility] [phone] FX     Last office visit with prescribing clinician: 2/26/2024   Last telemedicine visit with prescribing clinician: Visit date not found   Next office visit with prescribing clinician: 8/26/2024     Additional details provided by patient: PATIENT STATES SHE STARTED BACK ON HER MACRODANTIN AS WELL AND WOULD LIKE TO KNOW IF SHE SHOULD CONTINUE ON THE MEDICATION OR SWITCH TO A DIFFERENT MEDICATION.    Does the patient have less than a 3 day supply:  [x] Yes  [] No      Jasson Hernandes Rep   04/15/24 08:57 EDT

## 2024-04-17 ENCOUNTER — OFFICE VISIT (OUTPATIENT)
Age: 64
End: 2024-04-17
Payer: MEDICARE

## 2024-04-17 VITALS
DIASTOLIC BLOOD PRESSURE: 70 MMHG | HEIGHT: 60 IN | SYSTOLIC BLOOD PRESSURE: 124 MMHG | BODY MASS INDEX: 28.27 KG/M2 | WEIGHT: 144 LBS

## 2024-04-17 DIAGNOSIS — I78.1 SPIDER VEINS: Primary | ICD-10-CM

## 2024-04-17 NOTE — PROGRESS NOTES
Chief Complaint  New Patient (Bilateral varicose veins with aching and heaviness. )    Subjective      History of Present Illness  Janna Rich presents to [facility] as a new patient with complaints of spider veins.     Past History:  Medical History: has a past medical history of AC joint arthropathy (2016), Allergic, Anxiety, Arthritis, Carpal tunnel syndrome of left wrist (2016), Cervical disc herniation (2016), Cervical radiculopathy, Cervicalgia (2017), Cold sore, Decreased sensation of lower extremity (2017), Depression, Facial aging, Finger numbness (2016), Finger numbness, Gastric reflux, Hand numbness (2018), Hand weakness (2017), Heart murmur, High blood pressure, HTN (hypertension), Hypothyroidism, Lumbago (2017), Migraine, Muscle atrophy of upper extremity (2017), MVP (mitral valve prolapse), Night sweats, Paresthesia (2017), Seasonal allergies, Subacromial impingement, left (2016), Tremor (2018), Trigger finger of left thumb (2016), and Urinary tract infection.   Surgical History: has a past surgical history that includes Abdominal surgery (); Breast Augmentation; Carpal tunnel release;  section (, ); Laparoscopic cholecystectomy; Colonoscopy (, ); Esophagogastroduodenoscopy (); Hysterectomy (); Finger surgery (Right, 1916); and Laparoscopic tubal ligation ().   Family History: family history includes Cancer in her father; Hypertension in her brother and father; Lung cancer in her father.   Social History: reports that she has never smoked. She has never used smokeless tobacco. She reports current alcohol use of about 2.0 standard drinks of alcohol per week. She reports that she does not use drugs.    (Not in a hospital admission)     Allergies: Codeine, Morphine, and Ciprofloxacin   Objective   Vital Signs:  /70   Ht 152.4 cm  "(60\")   Wt 65.3 kg (144 lb)   BMI 28.12 kg/m²   Estimated body mass index is 28.12 kg/m² as calculated from the following:    Height as of this encounter: 152.4 cm (60\").    Weight as of this encounter: 65.3 kg (144 lb).               Physical Exam  Vitals reviewed.   Constitutional:       Appearance: Normal appearance. She is normal weight.   Cardiovascular:      Rate and Rhythm: Normal rate and regular rhythm.   Pulmonary:      Breath sounds: Normal breath sounds.   Musculoskeletal:         General: Normal range of motion.   Neurological:      General: No focal deficit present.      Mental Status: She is alert.        Venous:Telangiectasia  Reticular Vein(s)  CEAP Classification: 1    Result Review :                     Assessment and Plan     Diagnoses and all orders for this visit:    1. Spider veins (Primary)    Janna Rich is a 64 y.o. female who presents today as a new patient with complaints of telangiectasia veins to bilateral lower extremities.  She has had injection sclerotherapy performed twice approximately 20 years ago and had good success with that treatment.  She has not had any treatment since.  She has never had any surgeries or traumas to her legs.  No history of DVT or superficial thrombophlebitis.  She does not get migraines.    She would be a good candidate for injection sclerotherapy.  She will likely need serial treatments.  We have discussed what to expect.  The potential risk of bleeding, infection, DVT, STP, stroke, PE, hyperpigmentation, paresthesia, neuropathy, allergy anaphylaxis, ulceration and matting were discussed.  The sun, travel, and lifting restrictions were reviewed.  She will schedule this at her convenience, likely in the fall or winter.  She is planning to go ahead and schedule 3 sessions spaced 6 weeks apart.         Follow Up     Return for Laura chase in the fall.  Patient was given instructions and counseling regarding her condition or for health maintenance " advice. Please see specific information pulled into the AVS if appropriate.

## 2024-05-10 RX ORDER — ZOLPIDEM TARTRATE 10 MG/1
10 TABLET ORAL NIGHTLY PRN
Qty: 90 TABLET | Refills: 0 | Status: SHIPPED | OUTPATIENT
Start: 2024-05-10

## 2024-05-13 ENCOUNTER — PATIENT MESSAGE (OUTPATIENT)
Dept: FAMILY MEDICINE CLINIC | Facility: CLINIC | Age: 64
End: 2024-05-13
Payer: MEDICARE

## 2024-05-13 DIAGNOSIS — E03.9 ACQUIRED HYPOTHYROIDISM: Primary | ICD-10-CM

## 2024-05-14 ENCOUNTER — LAB (OUTPATIENT)
Dept: LAB | Facility: HOSPITAL | Age: 64
End: 2024-05-14
Payer: MEDICARE

## 2024-05-14 DIAGNOSIS — E03.9 ACQUIRED HYPOTHYROIDISM: ICD-10-CM

## 2024-05-14 PROCEDURE — 84443 ASSAY THYROID STIM HORMONE: CPT

## 2024-05-14 PROCEDURE — 36415 COLL VENOUS BLD VENIPUNCTURE: CPT

## 2024-05-14 PROCEDURE — 84439 ASSAY OF FREE THYROXINE: CPT

## 2024-05-15 LAB
T4 FREE SERPL-MCNC: 1.51 NG/DL (ref 0.93–1.7)
TSH SERPL DL<=0.05 MIU/L-ACNC: 0.21 UIU/ML (ref 0.27–4.2)

## 2024-06-11 DIAGNOSIS — E03.9 ACQUIRED HYPOTHYROIDISM: ICD-10-CM

## 2024-06-11 RX ORDER — LEVOTHYROXINE SODIUM 112 UG/1
112 TABLET ORAL DAILY
Qty: 90 TABLET | Refills: 1
Start: 2024-06-11

## 2024-06-12 RX ORDER — PHENTERMINE HYDROCHLORIDE 37.5 MG/1
37.5 CAPSULE ORAL EVERY MORNING
Qty: 30 CAPSULE | Refills: 0 | Status: SHIPPED | OUTPATIENT
Start: 2024-06-12

## 2024-06-25 DIAGNOSIS — E03.9 ACQUIRED HYPOTHYROIDISM: ICD-10-CM

## 2024-06-25 RX ORDER — LEVOTHYROXINE SODIUM 112 UG/1
112 TABLET ORAL DAILY
Qty: 90 TABLET | Refills: 1 | Status: SHIPPED | OUTPATIENT
Start: 2024-06-25

## 2024-07-09 ENCOUNTER — OFFICE VISIT (OUTPATIENT)
Dept: ORTHOPEDIC SURGERY | Facility: CLINIC | Age: 64
End: 2024-07-09
Payer: MEDICARE

## 2024-07-09 VITALS
OXYGEN SATURATION: 95 % | SYSTOLIC BLOOD PRESSURE: 120 MMHG | HEIGHT: 60 IN | WEIGHT: 144 LBS | HEART RATE: 63 BPM | DIASTOLIC BLOOD PRESSURE: 80 MMHG | BODY MASS INDEX: 28.27 KG/M2

## 2024-07-09 DIAGNOSIS — M25.571 RIGHT ANKLE PAIN, UNSPECIFIED CHRONICITY: Primary | ICD-10-CM

## 2024-07-09 DIAGNOSIS — S93.401A SPRAIN OF RIGHT ANKLE, UNSPECIFIED LIGAMENT, INITIAL ENCOUNTER: ICD-10-CM

## 2024-07-09 PROCEDURE — 3079F DIAST BP 80-89 MM HG: CPT | Performed by: ORTHOPAEDIC SURGERY

## 2024-07-09 PROCEDURE — 99213 OFFICE O/P EST LOW 20 MIN: CPT | Performed by: ORTHOPAEDIC SURGERY

## 2024-07-09 PROCEDURE — 3074F SYST BP LT 130 MM HG: CPT | Performed by: ORTHOPAEDIC SURGERY

## 2024-07-09 NOTE — PROGRESS NOTES
"Chief Complaint  Follow-up and Pain of the Right Ankle    Subjective          Janna Rich presents to [facility] for a follow up for her right ankle.     History of Present Illness    The patient presents here today for a follow up for her right ankle. She is having pain to the inside of the foot. She presents today in a ankle brace and normal shoe. She was at the beach about three weeks ago when she twisted her foot. She is able to put weight on her foot and has been taking over the counter medications for pain control.      Allergies   Allergen Reactions    Codeine Itching    Morphine Swelling    Ciprofloxacin Rash        Social History     Socioeconomic History    Marital status:    Tobacco Use    Smoking status: Never    Smokeless tobacco: Never   Vaping Use    Vaping status: Never Used   Substance and Sexual Activity    Alcohol use: Yes     Alcohol/week: 2.0 standard drinks of alcohol     Types: 1 Cans of beer, 1 Drinks containing 0.5 oz of alcohol per week     Comment: light 04/24/2018 - 1-2 beers month    Drug use: Never     Comment: denies substance abuse    Sexual activity: Yes     Partners: Male     Birth control/protection: Surgical        I reviewed the patient's chief complaint, history of present illness, review of systems, past medical history, surgical history, family history, social history, medications, and allergy list.     REVIEW OF SYSTEMS    Constitutional: Denies fevers, chills, weight loss  Cardiovascular: Denies chest pain, shortness of breath  Skin: Denies rashes, acute skin changes  Neurologic: Denies headache, loss of consciousness  MSK: Right ankle pain       Objective   Vital Signs:   /80   Pulse 63   Ht 152.4 cm (60\")   Wt 65.3 kg (144 lb)   SpO2 95%   BMI 28.12 kg/m²     Body mass index is 28.12 kg/m².    Physical Exam    General: Alert. No acute distress.   Right lower extremity: Minimal tenderness to the ankle, dorsiflexion to 20 " degrees, plantar flexion  to 55 dorsiflexion, pain in inversion, neurovascularly intact, calf soft, positive EHL, FHL, GS, and TA. Sensation intact to all 5 nerves of the foot.     Procedures    X-Ray Report:  Right ankle(s) X-Ray  Indication: Evaluation of right ankle pain   AP/Lateral view(s)  Findings: no acute fracture, no significant degenerative changes   Prior studies available for comparison: yes     Imaging Results (Most Recent)       Procedure Component Value Units Date/Time    XR Ankle 3+ View Right [[id number]] Resulted: 07/09/24 0824     Updated: 07/09/24 0827                     Assessment and Plan        No results found.     Diagnoses and all orders for this visit:    1. Right ankle pain, unspecified chronicity (Primary)  -     XR Ankle 3+ View Right    2. Sprain of right ankle, unspecified ligament, initial encounter        The patient presents here today for a follow up for her right ankle. X-rays were obtained in the office today and these were reviewed today.     She will continue the brace and over the counter medications for pain control.        Call or return if worsening symptoms.    Scribed for Selwyn Mackenzie MD by Melita Yang  07/09/2024   08:17 EDT         Follow Up       PRN     Patient was given instructions and counseling regarding her condition or for health maintenance advice. Please see specific information pulled into the AVS if appropriate.     I have personally performed the services described in this document as scribed by the above individual and it is both accurate and complete. Selwyn Mackenzie MD 07/09/24

## 2024-07-22 RX ORDER — DESVENLAFAXINE SUCCINATE 50 MG/1
TABLET, EXTENDED RELEASE ORAL
Qty: 90 TABLET | Refills: 1 | Status: SHIPPED | OUTPATIENT
Start: 2024-07-22

## 2024-08-19 DIAGNOSIS — I10 PRIMARY HYPERTENSION: ICD-10-CM

## 2024-08-19 DIAGNOSIS — E78.5 HYPERLIPIDEMIA, UNSPECIFIED HYPERLIPIDEMIA TYPE: ICD-10-CM

## 2024-08-19 DIAGNOSIS — E03.9 HYPOTHYROIDISM, UNSPECIFIED TYPE: Primary | ICD-10-CM

## 2024-08-22 ENCOUNTER — LAB (OUTPATIENT)
Dept: LAB | Facility: HOSPITAL | Age: 64
End: 2024-08-22
Payer: MEDICARE

## 2024-08-22 DIAGNOSIS — E03.9 HYPOTHYROIDISM, UNSPECIFIED TYPE: ICD-10-CM

## 2024-08-22 DIAGNOSIS — E78.5 HYPERLIPIDEMIA, UNSPECIFIED HYPERLIPIDEMIA TYPE: ICD-10-CM

## 2024-08-22 DIAGNOSIS — R50.9 FEVER, UNSPECIFIED FEVER CAUSE: ICD-10-CM

## 2024-08-22 DIAGNOSIS — I10 PRIMARY HYPERTENSION: ICD-10-CM

## 2024-08-22 LAB
ALBUMIN SERPL-MCNC: 4.6 G/DL (ref 3.5–5.2)
ALBUMIN/GLOB SERPL: 1.6 G/DL
ALP SERPL-CCNC: 78 U/L (ref 39–117)
ALT SERPL W P-5'-P-CCNC: 20 U/L (ref 1–33)
ANION GAP SERPL CALCULATED.3IONS-SCNC: 9.9 MMOL/L (ref 5–15)
AST SERPL-CCNC: 24 U/L (ref 1–32)
BILIRUB SERPL-MCNC: 0.6 MG/DL (ref 0–1.2)
BILIRUB UR QL STRIP: NEGATIVE
BUN SERPL-MCNC: 15 MG/DL (ref 8–23)
BUN/CREAT SERPL: 15 (ref 7–25)
CALCIUM SPEC-SCNC: 10.1 MG/DL (ref 8.6–10.5)
CHLORIDE SERPL-SCNC: 99 MMOL/L (ref 98–107)
CHOLEST SERPL-MCNC: 130 MG/DL (ref 0–200)
CLARITY UR: CLEAR
CO2 SERPL-SCNC: 29.1 MMOL/L (ref 22–29)
COLOR UR: ABNORMAL
CREAT SERPL-MCNC: 1 MG/DL (ref 0.57–1)
DEPRECATED RDW RBC AUTO: 42.9 FL (ref 37–54)
EGFRCR SERPLBLD CKD-EPI 2021: 62.3 ML/MIN/1.73
ERYTHROCYTE [DISTWIDTH] IN BLOOD BY AUTOMATED COUNT: 12.9 % (ref 12.3–15.4)
GLOBULIN UR ELPH-MCNC: 2.8 GM/DL
GLUCOSE SERPL-MCNC: 96 MG/DL (ref 65–99)
GLUCOSE UR STRIP-MCNC: NEGATIVE MG/DL
HCT VFR BLD AUTO: 38.7 % (ref 34–46.6)
HDLC SERPL-MCNC: 41 MG/DL (ref 40–60)
HGB BLD-MCNC: 13 G/DL (ref 12–15.9)
HGB UR QL STRIP.AUTO: NEGATIVE
HOLD SPECIMEN: NORMAL
KETONES UR QL STRIP: ABNORMAL
LDLC SERPL CALC-MCNC: 74 MG/DL (ref 0–100)
LDLC/HDLC SERPL: 1.8 {RATIO}
LEUKOCYTE ESTERASE UR QL STRIP.AUTO: NEGATIVE
MCH RBC QN AUTO: 30.8 PG (ref 26.6–33)
MCHC RBC AUTO-ENTMCNC: 33.6 G/DL (ref 31.5–35.7)
MCV RBC AUTO: 91.7 FL (ref 79–97)
NITRITE UR QL STRIP: NEGATIVE
PH UR STRIP.AUTO: 5.5 [PH] (ref 5–8)
PLATELET # BLD AUTO: 280 10*3/MM3 (ref 140–450)
PMV BLD AUTO: 11.3 FL (ref 6–12)
POTASSIUM SERPL-SCNC: 3.3 MMOL/L (ref 3.5–5.2)
PROT SERPL-MCNC: 7.4 G/DL (ref 6–8.5)
PROT UR QL STRIP: ABNORMAL
RBC # BLD AUTO: 4.22 10*6/MM3 (ref 3.77–5.28)
SODIUM SERPL-SCNC: 138 MMOL/L (ref 136–145)
SP GR UR STRIP: 1.02 (ref 1–1.03)
T4 FREE SERPL-MCNC: 1.59 NG/DL (ref 0.92–1.68)
TRIGL SERPL-MCNC: 77 MG/DL (ref 0–150)
TSH SERPL DL<=0.05 MIU/L-ACNC: 4.09 UIU/ML (ref 0.27–4.2)
UROBILINOGEN UR QL STRIP: ABNORMAL
VLDLC SERPL-MCNC: 15 MG/DL (ref 5–40)
WBC NRBC COR # BLD AUTO: 4.43 10*3/MM3 (ref 3.4–10.8)

## 2024-08-22 PROCEDURE — 81003 URINALYSIS AUTO W/O SCOPE: CPT

## 2024-08-22 PROCEDURE — 85027 COMPLETE CBC AUTOMATED: CPT

## 2024-08-22 PROCEDURE — 80053 COMPREHEN METABOLIC PANEL: CPT

## 2024-08-22 PROCEDURE — 84443 ASSAY THYROID STIM HORMONE: CPT

## 2024-08-22 PROCEDURE — 36415 COLL VENOUS BLD VENIPUNCTURE: CPT

## 2024-08-22 PROCEDURE — 84439 ASSAY OF FREE THYROXINE: CPT

## 2024-08-22 PROCEDURE — 80061 LIPID PANEL: CPT

## 2024-08-26 ENCOUNTER — OFFICE VISIT (OUTPATIENT)
Dept: FAMILY MEDICINE CLINIC | Facility: CLINIC | Age: 64
End: 2024-08-26
Payer: MEDICARE

## 2024-08-26 VITALS
HEIGHT: 60 IN | HEART RATE: 59 BPM | TEMPERATURE: 97.3 F | BODY MASS INDEX: 27.48 KG/M2 | OXYGEN SATURATION: 98 % | SYSTOLIC BLOOD PRESSURE: 126 MMHG | DIASTOLIC BLOOD PRESSURE: 80 MMHG | WEIGHT: 140 LBS

## 2024-08-26 DIAGNOSIS — M54.6 CHRONIC BILATERAL THORACIC BACK PAIN: ICD-10-CM

## 2024-08-26 DIAGNOSIS — E78.5 HYPERLIPIDEMIA, UNSPECIFIED HYPERLIPIDEMIA TYPE: ICD-10-CM

## 2024-08-26 DIAGNOSIS — G89.29 CHRONIC BILATERAL LOW BACK PAIN WITHOUT SCIATICA: Primary | ICD-10-CM

## 2024-08-26 DIAGNOSIS — M54.2 CERVICAL PAIN (NECK): ICD-10-CM

## 2024-08-26 DIAGNOSIS — G89.29 CHRONIC BILATERAL THORACIC BACK PAIN: ICD-10-CM

## 2024-08-26 DIAGNOSIS — G47.00 INSOMNIA, UNSPECIFIED TYPE: ICD-10-CM

## 2024-08-26 DIAGNOSIS — R29.898 WEAKNESS OF BOTH LOWER EXTREMITIES: ICD-10-CM

## 2024-08-26 DIAGNOSIS — R29.898 ARM WEAKNESS: ICD-10-CM

## 2024-08-26 DIAGNOSIS — M54.50 CHRONIC BILATERAL LOW BACK PAIN WITHOUT SCIATICA: Primary | ICD-10-CM

## 2024-08-26 DIAGNOSIS — Z51.81 MEDICATION MONITORING ENCOUNTER: ICD-10-CM

## 2024-08-26 LAB
AMPHET+METHAMPHET UR QL: NEGATIVE
AMPHETAMINE INTERNAL CONTROL: ABNORMAL
AMPHETAMINES UR QL: NEGATIVE
BARBITURATE INTERNAL CONTROL: ABNORMAL
BARBITURATES UR QL SCN: NEGATIVE
BENZODIAZ UR QL SCN: NEGATIVE
BENZODIAZEPINE INTERNAL CONTROL: ABNORMAL
BUPRENORPHINE INTERNAL CONTROL: ABNORMAL
BUPRENORPHINE SERPL-MCNC: NEGATIVE NG/ML
CANNABINOIDS SERPL QL: NEGATIVE
COCAINE INTERNAL CONTROL: ABNORMAL
COCAINE UR QL: NEGATIVE
EXPIRATION DATE: ABNORMAL
Lab: ABNORMAL
MDMA (ECSTASY) INTERNAL CONTROL: ABNORMAL
MDMA UR QL SCN: NEGATIVE
METHADONE INTERNAL CONTROL: ABNORMAL
METHADONE UR QL SCN: NEGATIVE
METHAMPHETAMINE INTERNAL CONTROL: ABNORMAL
MORPHINE INTERNAL CONTROL: ABNORMAL
MORPHINE/OPIATES SCREEN, URINE: NEGATIVE
OXYCODONE INTERNAL CONTROL: ABNORMAL
OXYCODONE UR QL SCN: NEGATIVE
PCP UR QL SCN: NEGATIVE
PHENCYCLIDINE INTERNAL CONTROL: ABNORMAL
THC INTERNAL CONTROL: ABNORMAL

## 2024-08-26 PROCEDURE — 1160F RVW MEDS BY RX/DR IN RCRD: CPT | Performed by: NURSE PRACTITIONER

## 2024-08-26 PROCEDURE — 3074F SYST BP LT 130 MM HG: CPT | Performed by: NURSE PRACTITIONER

## 2024-08-26 PROCEDURE — 99214 OFFICE O/P EST MOD 30 MIN: CPT | Performed by: NURSE PRACTITIONER

## 2024-08-26 PROCEDURE — 1159F MED LIST DOCD IN RCRD: CPT | Performed by: NURSE PRACTITIONER

## 2024-08-26 PROCEDURE — 3079F DIAST BP 80-89 MM HG: CPT | Performed by: NURSE PRACTITIONER

## 2024-08-26 PROCEDURE — 1126F AMNT PAIN NOTED NONE PRSNT: CPT | Performed by: NURSE PRACTITIONER

## 2024-08-26 RX ORDER — MELOXICAM 7.5 MG/1
7.5 TABLET ORAL DAILY
Qty: 90 TABLET | Refills: 0 | Status: SHIPPED | OUTPATIENT
Start: 2024-08-26

## 2024-08-26 RX ORDER — ZOLPIDEM TARTRATE 10 MG/1
10 TABLET ORAL NIGHTLY PRN
Qty: 90 TABLET | Refills: 0 | Status: SHIPPED | OUTPATIENT
Start: 2024-08-26

## 2024-08-26 RX ORDER — ROSUVASTATIN CALCIUM 10 MG/1
10 TABLET, COATED ORAL DAILY
Qty: 90 TABLET | Refills: 1 | Status: SHIPPED | OUTPATIENT
Start: 2024-08-26

## 2024-08-26 NOTE — PROGRESS NOTES
"cervicalChief Complaint  Hypothyroidism (6 month follow up)    Subjective         Janna Rich presents to [facility]  Presents to the office for 6-month follow-up.  I did review recent lab work with the patient.  She does have concerns over weakness in her arms and her legs.  She does state that she has a history of spinal stenosis but this has not been evaluated in many years.  She does state that she like to start with some x-rays to get this checked.  Patient does state that her arthritis seems to be worsening.  I did discuss starting Mobic to see if this gives her some relief.  I did note that her potassium levels were slightly low.  I explained that I would like for her to eat potassium rich foods to help improve her potassium levels.       Objective     Vitals:    08/26/24 0830   BP: 126/80   BP Location: Right arm   Patient Position: Sitting   Cuff Size: Adult   Pulse: 59   Temp: 97.3 °F (36.3 °C)   TempSrc: Temporal   SpO2: 98%   Weight: 63.5 kg (140 lb)   Height: 152.4 cm (60\")      Body mass index is 27.34 kg/m².             Physical Exam  Vitals reviewed.   Constitutional:       Appearance: Normal appearance.   Cardiovascular:      Rate and Rhythm: Normal rate and regular rhythm.      Pulses: Normal pulses.      Heart sounds: Normal heart sounds, S1 normal and S2 normal. No murmur heard.  Pulmonary:      Effort: Pulmonary effort is normal. No respiratory distress.      Breath sounds: Normal breath sounds.   Skin:     General: Skin is warm and dry.   Neurological:      Mental Status: She is alert and oriented to person, place, and time.   Psychiatric:         Attention and Perception: Attention normal.         Mood and Affect: Mood normal.         Behavior: Behavior normal.        Result Review :   The following data was reviewed by: BELLE Worthy on 08/26/2024:      Procedures    Assessment and Plan   Diagnoses and all orders for this visit:    1. Chronic " bilateral low back pain without sciatica (Primary)  -     XR Spine Lumbar 2 or 3 View; Future    2. Hyperlipidemia, unspecified hyperlipidemia type  -     rosuvastatin (Crestor) 10 MG tablet; Take 1 tablet by mouth Daily.  Dispense: 90 tablet; Refill: 1    3. Chronic bilateral thoracic back pain  -     XR Spine Thoracic 2 View; Future    4. Cervical pain (neck)  -     XR Spine Cervical 2 or 3 View; Future    5. Arm weakness  -     XR Spine Cervical 2 or 3 View; Future  -     XR Spine Thoracic 2 View; Future    6. Weakness of both lower extremities  -     XR Spine Lumbar 2 or 3 View; Future    7. Insomnia, unspecified type  -     zolpidem (AMBIEN) 10 MG tablet; Take 1 tablet by mouth At Night As Needed for Sleep.  Dispense: 90 tablet; Refill: 0  -     POC Medline 12 Panel Urine Drug Screen    8. Medication monitoring encounter  -     POC Medline 12 Panel Urine Drug Screen    Other orders  -     meloxicam (Mobic) 7.5 MG tablet; Take 1 tablet by mouth Daily.  Dispense: 90 tablet; Refill: 0          Follow Up   Return in about 6 months (around 2/26/2025) for Recheck.  Patient was given instructions and counseling regarding her condition or for health maintenance advice. Please see specific information pulled into the AVS if appropriate.

## 2024-08-28 ENCOUNTER — HOSPITAL ENCOUNTER (OUTPATIENT)
Dept: GENERAL RADIOLOGY | Facility: HOSPITAL | Age: 64
Discharge: HOME | End: 2024-08-28
Payer: MEDICARE

## 2024-08-28 DIAGNOSIS — M54.6 CHRONIC BILATERAL THORACIC BACK PAIN: ICD-10-CM

## 2024-08-28 DIAGNOSIS — R29.898 WEAKNESS OF BOTH LOWER EXTREMITIES: ICD-10-CM

## 2024-08-28 DIAGNOSIS — M54.2 CERVICAL PAIN (NECK): ICD-10-CM

## 2024-08-28 DIAGNOSIS — G89.29 CHRONIC BILATERAL LOW BACK PAIN WITHOUT SCIATICA: ICD-10-CM

## 2024-08-28 DIAGNOSIS — M54.50 CHRONIC BILATERAL LOW BACK PAIN WITHOUT SCIATICA: ICD-10-CM

## 2024-08-28 DIAGNOSIS — R29.898 ARM WEAKNESS: ICD-10-CM

## 2024-08-28 DIAGNOSIS — G89.29 CHRONIC BILATERAL THORACIC BACK PAIN: ICD-10-CM

## 2024-08-28 PROCEDURE — 72100 X-RAY EXAM L-S SPINE 2/3 VWS: CPT

## 2024-08-28 PROCEDURE — 72040 X-RAY EXAM NECK SPINE 2-3 VW: CPT

## 2024-08-28 PROCEDURE — 72072 X-RAY EXAM THORAC SPINE 3VWS: CPT

## 2024-10-21 DIAGNOSIS — I10 PRIMARY HYPERTENSION: ICD-10-CM

## 2024-10-21 RX ORDER — HYDROCHLOROTHIAZIDE 25 MG/1
25 TABLET ORAL DAILY
Qty: 90 TABLET | Refills: 3 | Status: SHIPPED | OUTPATIENT
Start: 2024-10-21

## 2024-10-21 RX ORDER — METOPROLOL TARTRATE 25 MG/1
25 TABLET, FILM COATED ORAL 2 TIMES DAILY
Qty: 180 TABLET | Refills: 3 | Status: SHIPPED | OUTPATIENT
Start: 2024-10-21

## 2024-10-21 RX ORDER — VALSARTAN 80 MG/1
80 TABLET ORAL 2 TIMES DAILY
Qty: 180 TABLET | Refills: 3 | Status: SHIPPED | OUTPATIENT
Start: 2024-10-21

## 2024-10-21 NOTE — TELEPHONE ENCOUNTER
UPCOMING APPTS  With Family Medicine (BELLE Worthy)  02/24/2025 at 7:30 AM  LAST OFFICE VISIT - THIS DEPT  8/26/2024 Shan De La Garza APRN

## 2024-11-11 DIAGNOSIS — G47.00 INSOMNIA, UNSPECIFIED TYPE: ICD-10-CM

## 2024-11-11 RX ORDER — ZOLPIDEM TARTRATE 10 MG/1
10 TABLET ORAL NIGHTLY PRN
Qty: 90 TABLET | Refills: 0 | Status: SHIPPED | OUTPATIENT
Start: 2024-11-11

## 2024-11-11 RX ORDER — MELOXICAM 7.5 MG/1
7.5 TABLET ORAL DAILY
Qty: 90 TABLET | Refills: 0 | Status: SHIPPED | OUTPATIENT
Start: 2024-11-11

## 2024-11-11 NOTE — TELEPHONE ENCOUNTER
UPCOMING APPTS  With Family Medicine (BELLE Worthy)  02/24/2025 at 7:30 AM  LAST OFFICE VISIT - THIS DEPT  8/26/2024 Shan De La Garza APRN    UDS8/26/24

## 2024-11-18 ENCOUNTER — HOSPITAL ENCOUNTER (EMERGENCY)
Facility: HOSPITAL | Age: 64
Discharge: HOME | End: 2024-11-18
Attending: EMERGENCY MEDICINE | Admitting: EMERGENCY MEDICINE
Payer: MEDICARE

## 2024-11-18 ENCOUNTER — APPOINTMENT (OUTPATIENT)
Dept: CT IMAGING | Facility: HOSPITAL | Age: 64
End: 2024-11-18
Payer: MEDICARE

## 2024-11-18 VITALS
HEART RATE: 65 BPM | OXYGEN SATURATION: 97 % | WEIGHT: 147.05 LBS | SYSTOLIC BLOOD PRESSURE: 169 MMHG | HEIGHT: 60 IN | BODY MASS INDEX: 28.87 KG/M2 | TEMPERATURE: 97.8 F | DIASTOLIC BLOOD PRESSURE: 83 MMHG | RESPIRATION RATE: 16 BRPM
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
## 2024-11-18 DIAGNOSIS — S00.93XA TRAUMATIC HEMATOMA OF HEAD, INITIAL ENCOUNTER: ICD-10-CM

## 2024-11-18 PROCEDURE — 70486 CT MAXILLOFACIAL W/O DYE: CPT

## 2024-11-18 PROCEDURE — 70450 CT HEAD/BRAIN W/O DYE: CPT

## 2024-11-18 PROCEDURE — 99284 EMERGENCY DEPT VISIT MOD MDM: CPT

## 2024-11-18 NOTE — DISCHARGE INSTRUCTIONS
You were evaluated in the emergency department today.  Your scans do not show any evidence of fracture or bleeding on your head.  Your facial bones are intact.  There was a 5 mm colloid cyst visualized in the third ventricle without any evidence of hydrocephalus.  Please follow-up with your primary care provider.  Apply ice and warm compresses.  Take Tylenol and ibuprofen for pain.  Return to the emergency department if you have any worsening symptoms including confusion, vomiting, change in mental status.

## 2024-11-18 NOTE — ED PROVIDER NOTES
Time: 2:40 PM EST  Date of encounter:  11/18/2024  Independent Historian/Clinical History and Information was obtained by:   Patient    History is limited by: N/A    Chief Complaint   Patient presents with    Fall     Pt states she tripped and hit her head on the concrete         History of Present Illness:  Patient is a 64 y.o. year old female who presents to the emergency department for evaluation of fall.  Patient was carrying a shower ryne when she stubbed her toe and fell face forward. (B. Seaver, APRN)    TAIWO DAVIS: I assumed care of this patient.  I agree with the above.  Patient reports she was carrying a chair for a friend who was getting discharged from the hospital, tripped and fell forward onto her face.  No loss of consciousness.  Reports she has a slight headache right now.  States that her glasses broke as well so her vision is slightly blurry without glasses.  Denies blood thinners.  Denies fevers, confusion, numbness or tingling, neck pain, back pain.    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 she was diagnosed with CTS and underwent carpal turnnel release without benefitl approximately 18 months ago, she develooped numbness in faisal right hand. On exam, she has numbness 1st and 2nd digit of both hands along with numbness  over the lateral aspect of the bilateral forearms. I would be concerned about a C    Hand weakness 2017    Heart murmur     High blood pressure     HTN (hypertension)     Hypothyroidism     Lumbago 2017    Migraine     Muscle atrophy of upper extremity 2017    MVP (mitral valve prolapse)     Night sweats     Paresthesia 2017    Seasonal allergies     Subacromial impingement, left 2016    Tremor 2018    the patient is noted to have a mild tremor that could be medication related    Trigger finger of left thumb 2016    Urinary tract infection      Past Surgical History:   Procedure Laterality Date    ABDOMINAL SURGERY      Abdominalplasty (tummy tuck)    APPENDECTOMY      BREAST AUGMENTATION      BREAST SURGERY      CARPAL TUNNEL RELEASE       SECTION  ,     COLONOSCOPY  ,     ENDOSCOPY      FINGER SURGERY Right 1916    trigger thumb release - Dr. Kapoor    HYSTERECTOMY      LAPAROSCOPIC CHOLECYSTECTOMY      LAPAROSCOPIC TUBAL LIGATION      TUBAL ABDOMINAL LIGATION       Family History   Problem Relation Age of Onset    Lung cancer Father     Hypertension Father     Cancer Father     Alcohol abuse Father     Arthritis Father     Hyperlipidemia Father     Hypertension Brother     Drug abuse Brother     Hyperlipidemia Brother     Alcohol abuse Brother     Drug abuse Brother     Hyperlipidemia Brother     Hyperlipidemia Brother        Home Medications:  Prior to Admission medications    Medication Sig Start Date End Date Taking? Authorizing Provider   desvenlafaxine (PRISTIQ) 50 MG 24 hr tablet TAKE 1 TABLET DAILY 24   Shan De La Garza APRN   dicyclomine (BENTYL) 20 MG tablet Take 1 tablet by mouth 2 (Two) Times a Day As Needed (cramping/diarrhea). 22   Shan De La Garza APRN   hydroCHLOROthiazide 25 MG tablet TAKE 1 TABLET DAILY 10/21/24   Shan De La Garza APRN   levothyroxine (SYNTHROID, LEVOTHROID) 112 MCG tablet Take 1 tablet by  "mouth Daily. 6/25/24   Shan De La Garza APRN   meloxicam (Mobic) 7.5 MG tablet Take 1 tablet by mouth Daily. 11/11/24   Shan De La Garza APRN   metoprolol tartrate (LOPRESSOR) 25 MG tablet TAKE 1 TABLET TWICE A DAY 10/21/24   Shan De La Garza APRN   nitrofurantoin (MACRODANTIN) 50 MG capsule Take 1 capsule by mouth 4 (Four) Times a Day. 4/15/24   Shan De La Garza APRN   rosuvastatin (Crestor) 10 MG tablet Take 1 tablet by mouth Daily. 8/26/24   Shan De La Garza APRN   valsartan (DIOVAN) 80 MG tablet TAKE 1 TABLET TWICE A DAY 10/21/24   Shan De La Garza APRN   zolpidem (AMBIEN) 10 MG tablet Take 1 tablet by mouth At Night As Needed for Sleep. 11/11/24   Shan De La Garza APRN        Social History:   Social History     Tobacco Use    Smoking status: Never    Smokeless tobacco: Never   Vaping Use    Vaping status: Never Used   Substance Use Topics    Alcohol use: Yes     Alcohol/week: 2.0 standard drinks of alcohol     Types: 1 Cans of beer, 1 Drinks containing 0.5 oz of alcohol per week     Comment: light 04/24/2018 - 1-2 beers month    Drug use: Never     Comment: denies substance abuse         Review of Systems:  Review of Systems   HENT:  Negative for congestion and ear pain.    Respiratory:  Negative for cough, shortness of breath and wheezing.    Cardiovascular:  Negative for chest pain, palpitations and leg swelling.   Gastrointestinal:  Negative for abdominal pain, nausea and vomiting.   Genitourinary:  Negative for dysuria and urgency.   Musculoskeletal:  Negative for arthralgias, back pain, gait problem, joint swelling, myalgias, neck pain and neck stiffness.   Skin:  Positive for color change and wound.   Psychiatric/Behavioral:  Negative for agitation, confusion, decreased concentration and dysphoric mood.         Physical Exam:  /83 (BP Location: Right arm, Patient Position: Sitting)   Pulse 65   Temp 97.8 °F (36.6 °C) (Oral)   Resp 16   Ht 152.4 cm (60\")   Wt 66.7 kg (147 lb 0.8 oz)   LMP  (LMP Unknown)   SpO2 " 97%   BMI 28.72 kg/m²         Physical Exam  HENT:      Head: Normocephalic. Contusion present.      Comments: Large hematoma to left frontal forehead.  Tender to palpation.  No active bleeding.  Small hematoma to left nasal bridge.  No active bleeding.     Right Ear: External ear normal.      Left Ear: External ear normal.      Nose: No congestion or rhinorrhea.      Comments: No nasal septal hematoma     Mouth/Throat:      Mouth: Mucous membranes are moist.   Eyes:      Extraocular Movements: Extraocular movements intact.      Conjunctiva/sclera: Conjunctivae normal.      Pupils: Pupils are equal, round, and reactive to light.   Neck:      Comments: No C-spine tenderness  Cardiovascular:      Rate and Rhythm: Normal rate and regular rhythm.      Pulses: Normal pulses.      Heart sounds: Normal heart sounds.   Pulmonary:      Effort: Pulmonary effort is normal.   Abdominal:      General: There is no distension.      Palpations: Abdomen is soft.   Musculoskeletal:         General: Normal range of motion.      Cervical back: Normal range of motion and neck supple. No rigidity or tenderness.      Right lower leg: No edema.      Left lower leg: No edema.   Skin:     General: Skin is warm and dry.      Capillary Refill: Capillary refill takes less than 2 seconds.   Neurological:      General: No focal deficit present.      Mental Status: She is alert and oriented to person, place, and time.   Psychiatric:         Mood and Affect: Mood normal.         Behavior: Behavior normal.                      Procedures:  Procedures      Medical Decision Making:      Comorbidities that affect care:    None    External Notes reviewed:    None      The following orders were placed and all results were independently analyzed by me:  Orders Placed This Encounter   Procedures    CT Head Without Contrast    CT Facial Bones Without Contrast       Medications Given in the Emergency Department:  Medications - No data to display     ED  Course:    The patient was initially evaluated in the triage area where orders were placed. The patient was later dispositioned by Francheska Jacobo PA-C.      The patient was advised to stay for completion of workup which includes but is not limited to communication of labs and radiological results, reassessment and plan. The patient was advised that leaving prior to disposition by a provider could result in critical findings that are not communicated to the patient.     ED Course as of 11/18/24 1623   Mon Nov 18, 2024   1442 PROVIDER IN TRIAGE  Patient was evaluated by me in triage, Bailey Seaver, APRN, CRISTIAN.  Orders were placed and patient is currently awaiting final results and disposition.   [AS]   1622 CT Facial Bones Without Contrast  No fracture [AS-2]   1622 CT Head Without Contrast  No intracranial acute abnormality.  5 mm colloid cyst visualized on roof of third ventricle.  No hydrocephalus. [AS-2]   1622 I discussed patient's findings of CT scan with patient.  She reports her sister had a colloid cyst and she did not think it was hereditary.  Patient has never been told about this previously.  Recommended she follow-up with her PCP regarding this finding. [AS-2]      ED Course User Index  [AS] Seaver, Alyce B, APRN  [AS-2] Francheska Jacobo PA-C       Labs:    Lab Results (last 24 hours)       ** No results found for the last 24 hours. **             Imaging:    CT Facial Bones Without Contrast    Result Date: 11/18/2024  CT FACIAL BONES WO CONTRAST Date of Exam: 11/18/2024 3:00 PM EST Indication: fall, head injury. Comparison: None available. Technique: Axial CT images were obtained from the inferior aspect of the mandible through the frontal sinuses without contrast administration.  Reconstructed coronal and sagittal images were also obtained. Automated exposure control and iterative construction methods were used. Findings: Left frontal scalp soft tissue swelling and left frontal scalp hematoma  is noted. No displaced calvarial fractures identified. No acute facial fracture is seen. Paranasal sinuses and mastoid air cells are clear. Mild intracranial carotid artery calcifications are incidentally noted. Imaged portion of the cervical spine is within normal limits. There is no temporomandibular dislocation. The orbital structures appear unremarkable.     Impression: 1. Left frontal scalp hematoma and scalp soft tissue swelling. 2. No acute facial fracture. Electronically Signed: Yue Shah MD  11/18/2024 3:36 PM EST  Workstation ID: [id number]    CT Head Without Contrast    Result Date: 11/18/2024  CT HEAD WO CONTRAST Date of Exam: 11/18/2024 3:00 PM EST Indication: fall, head injury. Comparison: None available. Technique: Axial CT images were obtained of the head without contrast administration.  Reconstructed coronal and sagittal images were also obtained. Automated exposure control and iterative construction methods were used. Findings: Within the roof of the third ventricle there is a 5 mm hyperdense nodule consistent with a colloid cyst. There is currently no resultant hydrocephalus. There is no evidence of acute infarct or acute intracranial hemorrhage. No abnormal extra-axial fluid collections are identified. No mass effect or midline shift. There is hematoma overlying the left frontal bone measuring 4.4 cm in transverse dimension and 1.2 cm in thickness. Its underlying skull fracture. Visualized paranasal sinuses and mastoid air cells are clear. Globes and orbits appear within normal limits.     Impression: 1. No acute intracranial abnormality. 2. 5 mm colloid cyst in the third ventricle. No evidence of hydrocephalus. 3. Scalloping left frontal bone. No underlying skull fracture. Electronically Signed: Jose James MD  11/18/2024 3:35 PM EST  Workstation ID: [id number]       Differential Diagnosis and Discussion:      Facial Pain/Swelling: Differential diagnosis includes but is not limited to  temporal arteritis, intracranial tumors, neuralgias, dental disease, ocular disease, TMJ syndrome, salivary gland disorders, sinusitis, cluster headaches, migraines, and psychogenic.    CT scan radiology impression was interpreted by me.    MDM     Amount and/or Complexity of Data Reviewed  Tests in the radiology section of CPT®: reviewed and ordered  Independent visualization of images, tracings, or specimens: yes    Risk of Complications, Morbidity, and/or Mortality  Presenting problems: low  Diagnostic procedures: low  Management options: low    Patient Progress  Patient progress: stable                     Patient Care Considerations:          Consultants/Shared Management Plan:    None    Social Determinants of Health:    Patient is independent, reliable, and has access to care.       Disposition and Care Coordination:    Discharged: The patient is suitable and stable for discharge with no need for consideration of admission.    I have explained the patient´s condition, diagnoses and treatment plan based on the information available to me at this time. I have answered questions and addressed any concerns. The patient has a good  understanding of the patient´s diagnosis, condition, and treatment plan as can be expected at this point. The vital signs have been stable. The patient´s condition is stable and appropriate for discharge from the emergency department.      The patient will pursue further outpatient evaluation with the primary care physician or other designated or consulting physician as outlined in the discharge instructions. They are agreeable to this plan of care and follow-up instructions have been explained in detail. The patient has received these instructions in written format and has expressed an understanding of the discharge instructions. The patient is aware that any significant change in condition or worsening of symptoms should prompt an immediate return to this or the closest emergency  department or call to 911.  I have explained discharge medications and the need for follow up with the patient/caretakers. This was also printed in the discharge instructions. Patient was discharged with the following medications and follow up:      Medication List      No changes were made to your prescriptions during this visit.      No follow-up provider specified.     Final diagnoses:   Fall, initial encounter   Traumatic hematoma of head, initial encounter        ED Disposition       ED Disposition   Discharge    Condition   Stable    Comment   --               This medical record created using voice recognition software.             Francheska Jacobo PA-C  11/18/24 3027

## 2024-12-02 ENCOUNTER — LAB (OUTPATIENT)
Dept: LAB | Facility: HOSPITAL | Age: 64
End: 2024-12-02
Payer: MEDICARE

## 2024-12-02 ENCOUNTER — OFFICE VISIT (OUTPATIENT)
Dept: FAMILY MEDICINE CLINIC | Facility: CLINIC | Age: 64
End: 2024-12-02
Payer: MEDICARE

## 2024-12-02 VITALS
HEART RATE: 60 BPM | OXYGEN SATURATION: 99 % | BODY MASS INDEX: 28.92 KG/M2 | WEIGHT: 148.1 LBS | DIASTOLIC BLOOD PRESSURE: 82 MMHG | SYSTOLIC BLOOD PRESSURE: 125 MMHG
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
## 2024-12-02 DIAGNOSIS — E03.9 HYPOTHYROIDISM, UNSPECIFIED TYPE: ICD-10-CM

## 2024-12-02 DIAGNOSIS — R42 DIZZINESS: ICD-10-CM

## 2024-12-02 DIAGNOSIS — G44.319 ACUTE POST-TRAUMATIC HEADACHE, NOT INTRACTABLE: ICD-10-CM

## 2024-12-02 DIAGNOSIS — Q04.6 COLLOID CYST OF THIRD VENTRICLE: ICD-10-CM

## 2024-12-02 DIAGNOSIS — G62.9 NEUROPATHY: ICD-10-CM
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
## 2024-12-02 LAB
ALBUMIN SERPL-MCNC: 4.4 G/DL (ref 3.5–5.2)
ALBUMIN/GLOB SERPL: 1.7 G/DL
ALP SERPL-CCNC: 85 U/L (ref 39–117)
ALT SERPL W P-5'-P-CCNC: 26 U/L (ref 1–33)
ANION GAP SERPL CALCULATED.3IONS-SCNC: 9 MMOL/L (ref 5–15)
AST SERPL-CCNC: 25 U/L (ref 1–32)
BASOPHILS # BLD AUTO: 0.02 10*3/MM3 (ref 0–0.2)
BASOPHILS NFR BLD AUTO: 0.4 % (ref 0–1.5)
BILIRUB SERPL-MCNC: 0.5 MG/DL (ref 0–1.2)
BUN SERPL-MCNC: 16 MG/DL (ref 8–23)
BUN/CREAT SERPL: 15.5 (ref 7–25)
CALCIUM SPEC-SCNC: 9.9 MG/DL (ref 8.6–10.5)
CHLORIDE SERPL-SCNC: 97 MMOL/L (ref 98–107)
CO2 SERPL-SCNC: 28 MMOL/L (ref 22–29)
CREAT SERPL-MCNC: 1.03 MG/DL (ref 0.57–1)
DEPRECATED RDW RBC AUTO: 40.7 FL (ref 37–54)
EGFRCR SERPLBLD CKD-EPI 2021: 60.1 ML/MIN/1.73
EOSINOPHIL # BLD AUTO: 0 10*3/MM3 (ref 0–0.4)
EOSINOPHIL NFR BLD AUTO: 0 % (ref 0.3–6.2)
ERYTHROCYTE [DISTWIDTH] IN BLOOD BY AUTOMATED COUNT: 12.5 % (ref 12.3–15.4)
GLOBULIN UR ELPH-MCNC: 2.6 GM/DL
GLUCOSE SERPL-MCNC: 97 MG/DL (ref 65–99)
HCT VFR BLD AUTO: 39.7 % (ref 34–46.6)
HGB BLD-MCNC: 13.3 G/DL (ref 12–15.9)
IMM GRANULOCYTES # BLD AUTO: 0.01 10*3/MM3 (ref 0–0.05)
IMM GRANULOCYTES NFR BLD AUTO: 0.2 % (ref 0–0.5)
LYMPHOCYTES # BLD AUTO: 1.29 10*3/MM3 (ref 0.7–3.1)
LYMPHOCYTES NFR BLD AUTO: 28 % (ref 19.6–45.3)
MAGNESIUM SERPL-MCNC: 2.3 MG/DL (ref 1.6–2.4)
MCH RBC QN AUTO: 30 PG (ref 26.6–33)
MCHC RBC AUTO-ENTMCNC: 33.5 G/DL (ref 31.5–35.7)
MCV RBC AUTO: 89.6 FL (ref 79–97)
MONOCYTES # BLD AUTO: 0.45 10*3/MM3 (ref 0.1–0.9)
MONOCYTES NFR BLD AUTO: 9.8 % (ref 5–12)
NEUTROPHILS NFR BLD AUTO: 2.83 10*3/MM3 (ref 1.7–7)
NEUTROPHILS NFR BLD AUTO: 61.6 % (ref 42.7–76)
NRBC BLD AUTO-RTO: 0 /100 WBC (ref 0–0.2)
PLATELET # BLD AUTO: 277 10*3/MM3 (ref 140–450)
PMV BLD AUTO: 11.2 FL (ref 6–12)
POTASSIUM SERPL-SCNC: 4.1 MMOL/L (ref 3.5–5.2)
PROT SERPL-MCNC: 7 G/DL (ref 6–8.5)
RBC # BLD AUTO: 4.43 10*6/MM3 (ref 3.77–5.28)
SODIUM SERPL-SCNC: 134 MMOL/L (ref 136–145)
T4 FREE SERPL-MCNC: 1.26 NG/DL (ref 0.92–1.68)
TSH SERPL DL<=0.05 MIU/L-ACNC: 10.4 UIU/ML (ref 0.27–4.2)
WBC NRBC COR # BLD AUTO: 4.6 10*3/MM3 (ref 3.4–10.8)

## 2024-12-02 PROCEDURE — 1126F AMNT PAIN NOTED NONE PRSNT: CPT

## 2024-12-02 PROCEDURE — 3074F SYST BP LT 130 MM HG: CPT

## 2024-12-02 PROCEDURE — 36415 COLL VENOUS BLD VENIPUNCTURE: CPT

## 2024-12-02 PROCEDURE — 85025 COMPLETE CBC W/AUTO DIFF WBC: CPT

## 2024-12-02 PROCEDURE — 80053 COMPREHEN METABOLIC PANEL: CPT

## 2024-12-02 PROCEDURE — 83735 ASSAY OF MAGNESIUM: CPT

## 2024-12-02 PROCEDURE — 84439 ASSAY OF FREE THYROXINE: CPT

## 2024-12-02 PROCEDURE — 3079F DIAST BP 80-89 MM HG: CPT

## 2024-12-02 PROCEDURE — 99214 OFFICE O/P EST MOD 30 MIN: CPT

## 2024-12-02 PROCEDURE — 84443 ASSAY THYROID STIM HORMONE: CPT

## 2024-12-02 RX ORDER — PREDNISONE 20 MG/1
20 TABLET ORAL 2 TIMES DAILY
Qty: 10 TABLET | Refills: 0 | Status: SHIPPED | OUTPATIENT
Start: 2024-12-02

## 2024-12-02 NOTE — PROGRESS NOTES
Janna Rich presents to [facility] who presents to the clinic for ER follow-up.      History of Present Illness  This is a 64-year-old female who presents to the clinic for ER follow-up.    Patient states that she is here to follow-up after sustaining a fall on 18 November.  States that she was out walking carrying a portable toilet for a friend when she tripped on a sidewalk and went headfirst.  States that she went straight and hit right on her face, she had immediate hematoma on the left frontal area of her face, immediately had 2 black eyes, a pretty intense headache, did not lose consciousness, ended up going to the ER for evaluation.  There they did do both a CT scan of the facial bones which did not show any acute findings, and they also did a CT scan of the head.  The CT scan of the head showed no acute findings but did show a new area of concern with a 5 mm colloid cyst in the third ventricle.  States that this was new for her, but she does also mention that her sister had a very similar issue and actually ended up having a traumatic brain injury from it because the swelling got so intense in her brain, she had to be emergently taken to the ER because of all the swelling from this type of cyst.  States that she is never been told that she has had this before, has had imaging back in [id number], but it was not mentioned at that time.  Did obtain those records and did not see any signs of a cyst on those exams.  Patient states that she has been having some chronic issues that she thought was related to her chronic neuropathy she has been diagnosed with since 2018.  States that she does have frequent falls, she has a lot of weakness, she is also got some chronic issues out of her right knee as well.  States that she also experiences dizziness and weakness, which she once again did think was related to the neuropathy.  Would like an updated referral back to physical therapy,  as it has really helped in the past and she has not been able to participate in physical therapy for the past several months due to several things that have gone on recently.  Patient states that she does still have a residual headache now, dizziness has improved, swelling and hematoma have reduced.    The following portions of the patient's history were personally reviewed and updated as appropriate: allergies, current medications, past medical history, past surgical history, past family history, and past social history.       Objective   Vital Signs:   /82   Pulse 60   Wt 67.2 kg (148 lb 1.6 oz)   SpO2 99%   BMI 28.92 kg/m²     Body mass index is 28.92 kg/m².    All labs, imaging, test results, and specialty provider notes reviewed with patient.     Physical Exam  Vitals reviewed.   Constitutional:       Appearance: Normal appearance.   HENT:      Head:        Comments: 1.  Hematoma noted  2.  Bruising noted  3.  Bruising noted     Right Ear: Swelling present. No drainage or tenderness. Tympanic membrane is not perforated.      Left Ear: Swelling present. No drainage or tenderness. Tympanic membrane is not perforated.   Eyes:      General:         Right eye: No foreign body.         Left eye: No foreign body.      Extraocular Movements: Extraocular movements intact.      Conjunctiva/sclera: Conjunctivae normal.   Cardiovascular:      Rate and Rhythm: Normal rate and regular rhythm.      Pulses: Normal pulses.      Heart sounds: Normal heart sounds.   Pulmonary:      Effort: Pulmonary effort is normal.      Breath sounds: Normal breath sounds.   Neurological:      General: No focal deficit present.      Mental Status: She is alert and oriented to person, place, and time.                  BMI is >= 25 and <30. (Overweight) The following options were offered after discussion;: exercise counseling/recommendations and nutrition counseling/recommendations            Assessment and Plan:  Diagnoses and all  orders for this visit:    1. Fall, subsequent encounter (Primary)  -     predniSONE (DELTASONE) 20 MG tablet; Take 1 tablet by mouth 2 (Two) Times a Day.  Dispense: 10 tablet; Refill: 0  -     CBC Auto Differential; Future  -     Comprehensive Metabolic Panel; Future  -     TSH Rfx On Abnormal To Free T4; Future  -     Magnesium; Future  -     Ambulatory Referral to Neurosurgery  -     Ambulatory Referral to Physical Therapy for Evaluation & Treatment    2. Dizziness  -     predniSONE (DELTASONE) 20 MG tablet; Take 1 tablet by mouth 2 (Two) Times a Day.  Dispense: 10 tablet; Refill: 0  -     CBC Auto Differential; Future  -     Comprehensive Metabolic Panel; Future  -     TSH Rfx On Abnormal To Free T4; Future  -     Magnesium; Future  -     Ambulatory Referral to Neurosurgery  -     Ambulatory Referral to Physical Therapy for Evaluation & Treatment    3. Acute post-traumatic headache, not intractable  Comments:  Provided prednisone to help alleviate inflammation and swelling from traumatic episode, discussed worsening symptoms and return to ER.  Orders:  -     predniSONE (DELTASONE) 20 MG tablet; Take 1 tablet by mouth 2 (Two) Times a Day.  Dispense: 10 tablet; Refill: 0  -     CBC Auto Differential; Future  -     Comprehensive Metabolic Panel; Future  -     TSH Rfx On Abnormal To Free T4; Future  -     Magnesium; Future    4. Hypothyroidism, unspecified type  -     TSH Rfx On Abnormal To Free T4; Future    5. Colloid cyst of third ventricle  Comments:  Placed referral to neurosurgery for evaluation and management.  Warning signs discussed and when should seek ER evaluation.  Orders:  -     Ambulatory Referral to Neurosurgery    6. Neuropathy  Comments:  Will refer back to physical therapy for strengthening.  Orders:  -     Ambulatory Referral to Physical Therapy for Evaluation & Treatment      We will also obtain blood work to rule out any other contributing factors as well, treat based off results if  indicated.    Follow Up:  No follow-ups on file.    Patient was given instructions and counseling regarding her condition or for health maintenance advice. Please see specific information pulled into the AVS if appropriate.

## 2024-12-03 DIAGNOSIS — E03.9 HYPOTHYROIDISM, UNSPECIFIED TYPE: Primary | ICD-10-CM

## 2024-12-04 DIAGNOSIS — E03.9 ACQUIRED HYPOTHYROIDISM: ICD-10-CM

## 2024-12-04 RX ORDER — LEVOTHYROXINE SODIUM 112 UG/1
112 TABLET ORAL DAILY
Qty: 90 TABLET | Refills: 1 | Status: SHIPPED | OUTPATIENT
Start: 2024-12-04

## 2024-12-04 NOTE — TELEPHONE ENCOUNTER
UPCOMING APPTS  With Family Medicine ([facility])  02/24/2025 at 7:30 AM  LAST OFFICE VISIT - THIS DEPT  12/2/2024 Chasity Sainz APRN

## 2024-12-19 ENCOUNTER — OFFICE VISIT (OUTPATIENT)
Dept: NEUROSURGERY | Facility: CLINIC | Age: 64
End: 2024-12-19
Payer: MEDICARE

## 2024-12-19 VITALS
BODY MASS INDEX: 29.37 KG/M2 | HEIGHT: 60 IN | SYSTOLIC BLOOD PRESSURE: 137 MMHG | DIASTOLIC BLOOD PRESSURE: 88 MMHG | WEIGHT: 149.6 LBS

## 2024-12-19 DIAGNOSIS — Q04.6 COLLOID CYST OF BRAIN: Primary | ICD-10-CM

## 2024-12-19 NOTE — PROGRESS NOTES
"Chief Complaint  Colloid cyst    Subjective          Janna Rich who is a 64 y.o. year old female who presents to [facility] for colloid cyst.    History of Present Illness  The patient is a 64-year-old female who presents for evaluation of a colloid cyst.    She experienced a fall while carrying a shower chair, during which she tripped over the edge of a sidewalk. She did not lose consciousness but sustained black eyes and a persistent bump on her head, which has been present for 4 weeks. Since the incident, she has been experiencing headaches, which intensify as the day progresses. She reports no nausea. She also reports rhinorrhea but no clear fluid discharge from her nose or ears post-fall. A week after the fall, she consulted her primary care physician, who noted the presence of fluid in her ears. She has been undergoing physical therapy for vertigo due to dizziness. An MRI scan was performed in 2017, but she is uncertain if it revealed any abnormalities.    FAMILY HISTORY  Her sister had a colloid cyst and experienced brain herniation.       Review of Systems     Objective   Vital Signs:   /88 (BP Location: Right arm, Patient Position: Sitting)   Ht 152.4 cm (60\")   Wt 67.9 kg (149 lb 9.6 oz)   BMI 29.22 kg/m²       Physical Exam  Constitutional:       Appearance: She is normal weight.   Neurological:      Mental Status: She is alert.      Comments: Nl FTN  No drift  EOMI     Psychiatric:         Mood and Affect: Mood normal.        Neurological Exam  Mental Status  Alert.        Result Review :         Results  Imaging  CT scan shows a colloid cyst of 5 mm.          Assessment and Plan    Diagnoses and all orders for this visit:    1. Colloid cyst of brain (Primary)  -     Ambulatory Referral to Neurosurgery        Assessment & Plan  1. Colloid cyst.  The patient has a colloid cyst, which is benign but carries a slight risk of developing hydrocephalus " and a very slight risk of sudden death. The cyst is currently 5 mm in size. There is no evidence of hydrocephalus or fluid accumulation behind the cyst. The patient has been experiencing headaches since her fall, which are not worrisome for hydrocephalus at this time. She was informed about the potential risks associated with the cyst, including the possibility of sudden death due to blockage of spinal fluid flow. The patient was advised to monitor for any changes in her headache pattern and to seek immediate medical attention at the emergency room if such changes occur. She was also informed about the option of surgical removal of the cyst through an intraventricular approach, which can be performed by colleagues in [place] or [place]. Alternatively, she can opt for regular imaging follow-ups to ensure the cyst remains stable in size.    2. Post-concussive syndrome.  The patient has been experiencing headaches and dizziness since her fall, which are consistent with post-concussive syndrome. She was informed that these symptoms are common after a significant head injury. The possibility of a minor skull fracture was discussed, given the presence of raccoon eyes, but it is not considered severe. She was advised to continue monitoring her symptoms and to report any new or worsening symptoms.         Follow Up   No follow-ups on file.  Patient was given instructions and counseling regarding her condition or for health maintenance advice. Please see specific information pulled into the AVS if appropriate.     Patient or patient representative verbalized consent for the use of Ambient Listening during the visit with  Genaro Souza MD for chart documentation. 12/19/2024  14:40 EST

## 2024-12-27 ENCOUNTER — PATIENT ROUNDING (BHMG ONLY) (OUTPATIENT)
Dept: NEUROSURGERY | Facility: CLINIC | Age: 64
End: 2024-12-27
Payer: MEDICARE

## 2025-01-07 ENCOUNTER — LAB (OUTPATIENT)
Dept: LAB | Facility: HOSPITAL | Age: 65
End: 2025-01-07
Payer: MEDICARE

## 2025-01-07 DIAGNOSIS — E03.9 HYPOTHYROIDISM, UNSPECIFIED TYPE: ICD-10-CM

## 2025-01-07 LAB — TSH SERPL DL<=0.05 MIU/L-ACNC: 1.29 UIU/ML (ref 0.27–4.2)

## 2025-01-07 PROCEDURE — 84443 ASSAY THYROID STIM HORMONE: CPT

## 2025-01-07 PROCEDURE — 36415 COLL VENOUS BLD VENIPUNCTURE: CPT

## 2025-01-20 DIAGNOSIS — E78.5 HYPERLIPIDEMIA, UNSPECIFIED HYPERLIPIDEMIA TYPE: ICD-10-CM

## 2025-01-20 RX ORDER — DESVENLAFAXINE 50 MG/1
TABLET, FILM COATED, EXTENDED RELEASE ORAL
Qty: 90 TABLET | Refills: 3 | Status: SHIPPED | OUTPATIENT
Start: 2025-01-20

## 2025-01-20 RX ORDER — ROSUVASTATIN CALCIUM 10 MG/1
10 TABLET, COATED ORAL DAILY
Qty: 90 TABLET | Refills: 3 | Status: SHIPPED | OUTPATIENT
Start: 2025-01-20

## 2025-01-21 RX ORDER — MELOXICAM 7.5 MG/1
7.5 TABLET ORAL DAILY
Qty: 90 TABLET | Refills: 3 | Status: SHIPPED | OUTPATIENT
Start: 2025-01-21

## 2025-02-24 ENCOUNTER — OFFICE VISIT (OUTPATIENT)
Dept: FAMILY MEDICINE CLINIC | Facility: CLINIC | Age: 65
End: 2025-02-24
Payer: MEDICARE

## 2025-02-24 VITALS
TEMPERATURE: 97.8 F | WEIGHT: 148 LBS | HEIGHT: 60 IN | BODY MASS INDEX: 29.06 KG/M2 | RESPIRATION RATE: 20 BRPM | HEART RATE: 69 BPM | SYSTOLIC BLOOD PRESSURE: 110 MMHG | DIASTOLIC BLOOD PRESSURE: 68 MMHG | OXYGEN SATURATION: 100 %

## 2025-02-24 DIAGNOSIS — R20.0 NUMBNESS AND TINGLING IN RIGHT HAND: Primary | ICD-10-CM

## 2025-02-24 DIAGNOSIS — R20.2 NUMBNESS AND TINGLING IN RIGHT HAND: Primary | ICD-10-CM

## 2025-02-24 DIAGNOSIS — R60.0 LOCALIZED EDEMA: ICD-10-CM

## 2025-02-24 DIAGNOSIS — G47.00 INSOMNIA, UNSPECIFIED TYPE: ICD-10-CM

## 2025-02-24 DIAGNOSIS — F41.9 ANXIETY: ICD-10-CM

## 2025-02-24 DIAGNOSIS — Z71.3 NUTRITIONAL COUNSELING: ICD-10-CM

## 2025-02-24 DIAGNOSIS — E66.3 OVERWEIGHT: ICD-10-CM

## 2025-02-24 DIAGNOSIS — Z12.31 ENCOUNTER FOR SCREENING MAMMOGRAM FOR MALIGNANT NEOPLASM OF BREAST: ICD-10-CM

## 2025-02-24 PROCEDURE — 3078F DIAST BP <80 MM HG: CPT | Performed by: NURSE PRACTITIONER

## 2025-02-24 PROCEDURE — 1160F RVW MEDS BY RX/DR IN RCRD: CPT | Performed by: NURSE PRACTITIONER

## 2025-02-24 PROCEDURE — 3074F SYST BP LT 130 MM HG: CPT | Performed by: NURSE PRACTITIONER

## 2025-02-24 PROCEDURE — 1159F MED LIST DOCD IN RCRD: CPT | Performed by: NURSE PRACTITIONER

## 2025-02-24 PROCEDURE — 99215 OFFICE O/P EST HI 40 MIN: CPT | Performed by: NURSE PRACTITIONER

## 2025-02-24 PROCEDURE — 1126F AMNT PAIN NOTED NONE PRSNT: CPT | Performed by: NURSE PRACTITIONER

## 2025-02-24 RX ORDER — ZOLPIDEM TARTRATE 10 MG/1
10 TABLET ORAL NIGHTLY PRN
Qty: 90 TABLET | Refills: 0 | Status: SHIPPED | OUTPATIENT
Start: 2025-02-24

## 2025-02-24 RX ORDER — SPIRONOLACTONE 25 MG/1
25 TABLET ORAL DAILY
Qty: 90 TABLET | Refills: 1 | Status: SHIPPED | OUTPATIENT
Start: 2025-02-24

## 2025-02-24 RX ORDER — BUPROPION HYDROCHLORIDE 150 MG/1
150 TABLET ORAL DAILY
Qty: 90 TABLET | Refills: 1 | Status: SHIPPED | OUTPATIENT
Start: 2025-02-24

## 2025-02-24 NOTE — PROGRESS NOTES
"Chief Complaint  Upper Extremity Issue (Right 3 fingers going numb ), Fall (Follow up from fall in December ), and Obesity (Wants to talk about weight gain )    Subjective         Janna Rich presents to [facility]  Patient presents to the office for 6-month follow-up.  She does state that she is wanting to wean herself off the Pristiq.  She also discussed her weight and struggling with gaining weight.  She does state that this is causing some shortness of breath with exertion.  I did review her BMI as well as her previous labs.  I did discuss utilizing Wellbutrin to help with weight as well as anxiety.  Patient states that she is willing to try this.  She also complains of numbness and tingling to her right hand.  Blood pressure is 110/68.  Denies any chest pain shortness breath palpitations at this time.  She does state that she would like to change her HCTZ to spironolactone.    Upper Extremity Issue  Fall         Objective     Vitals:    02/24/25 0733   BP: 110/68   Pulse: 69   Resp: 20   Temp: 97.8 °F (36.6 °C)   SpO2: 100%   Weight: 67.1 kg (148 lb)   Height: 152.4 cm (60\")      Body mass index is 28.9 kg/m².             Physical Exam  Vitals reviewed.   Constitutional:       Appearance: Normal appearance.   Cardiovascular:      Rate and Rhythm: Normal rate and regular rhythm.      Pulses: Normal pulses.      Heart sounds: Normal heart sounds, S1 normal and S2 normal. No murmur heard.  Pulmonary:      Effort: Pulmonary effort is normal. No respiratory distress.      Breath sounds: Normal breath sounds.   Musculoskeletal:      Comments: Negative Tinel's, positive Phalen's test   Skin:     General: Skin is warm and dry.   Neurological:      Mental Status: She is alert and oriented to person, place, and time.   Psychiatric:         Attention and Perception: Attention normal.         Mood and Affect: Mood normal.         Behavior: Behavior normal.          Result Review " :   The following data was reviewed by: BELLE Worthy on 02/24/2025:      Procedures    Assessment and Plan   Diagnoses and all orders for this visit:    1. Numbness and tingling in right hand (Primary)  -     EMG & Nerve Conduction Test; Future    2. Insomnia, unspecified type  -     zolpidem (AMBIEN) 10 MG tablet; Take 1 tablet by mouth At Night As Needed for Sleep.  Dispense: 90 tablet; Refill: 0    3. Encounter for screening mammogram for malignant neoplasm of breast  -     Mammo Screening Digital Tomosynthesis Bilateral With CAD; Future    4. Overweight  -     buPROPion XL (Wellbutrin XL) 150 MG 24 hr tablet; Take 1 tablet by mouth Daily.  Dispense: 90 tablet; Refill: 1    5. Nutritional counseling  -     buPROPion XL (Wellbutrin XL) 150 MG 24 hr tablet; Take 1 tablet by mouth Daily.  Dispense: 90 tablet; Refill: 1    6. Anxiety  -     buPROPion XL (Wellbutrin XL) 150 MG 24 hr tablet; Take 1 tablet by mouth Daily.  Dispense: 90 tablet; Refill: 1    7. Localized edema  -     spironolactone (Aldactone) 25 MG tablet; Take 1 tablet by mouth Daily.  Dispense: 90 tablet; Refill: 1      40 minutes spent with patient face-to-face reviewing records, assessing, counseling and documenting.    Follow Up   Return in about 6 months (around 8/24/2025) for Recheck.  Patient was given instructions and counseling regarding her condition or for health maintenance advice. Please see specific information pulled into the AVS if appropriate.

## 2025-02-28 DIAGNOSIS — G47.00 INSOMNIA, UNSPECIFIED TYPE: ICD-10-CM

## 2025-02-28 RX ORDER — ZOLPIDEM TARTRATE 10 MG/1
10 TABLET ORAL NIGHTLY PRN
Qty: 90 TABLET | Refills: 0 | OUTPATIENT
Start: 2025-02-28

## 2025-03-05 ENCOUNTER — OFFICE VISIT (OUTPATIENT)
Dept: FAMILY MEDICINE CLINIC | Facility: CLINIC | Age: 65
End: 2025-03-05
Payer: MEDICARE

## 2025-03-05 VITALS
TEMPERATURE: 97.2 F | WEIGHT: 146.4 LBS | HEART RATE: 73 BPM | SYSTOLIC BLOOD PRESSURE: 126 MMHG | OXYGEN SATURATION: 97 % | DIASTOLIC BLOOD PRESSURE: 72 MMHG | HEIGHT: 60 IN | BODY MASS INDEX: 28.74 KG/M2

## 2025-03-05 DIAGNOSIS — R20.2 NUMBNESS AND TINGLING IN BOTH HANDS: Primary | ICD-10-CM

## 2025-03-05 DIAGNOSIS — Z00.00 MEDICARE ANNUAL WELLNESS VISIT, SUBSEQUENT: ICD-10-CM

## 2025-03-05 DIAGNOSIS — R20.0 NUMBNESS AND TINGLING IN BOTH HANDS: Primary | ICD-10-CM

## 2025-03-05 NOTE — PROGRESS NOTES
Subjective   The ABCs of the Annual Wellness Visit  Medicare Wellness Visit      Janna Rich is a 64 y.o. patient who presents for a Medicare Wellness Visit.    The following portions of the patient's history were reviewed and   updated as appropriate: allergies, current medications, past family history, past medical history, past social history, past surgical history, and problem list.    Compared to one year ago, the patient's physical   health is worse.  Compared to one year ago, the patient's mental   health is the same.    Recent Hospitalizations:  She was not admitted to the hospital during the last year.     Current Medical Providers:  Patient Care Team:  Shan De La Garza APRN as PCP - General (Nurse Practitioner)    Outpatient Medications Prior to Visit   Medication Sig Dispense Refill    buPROPion XL (Wellbutrin XL) 150 MG 24 hr tablet Take 1 tablet by mouth Daily. 90 tablet 1    dicyclomine (BENTYL) 20 MG tablet Take 1 tablet by mouth 2 (Two) Times a Day As Needed (cramping/diarrhea). 180 tablet 0    levothyroxine (SYNTHROID, LEVOTHROID) 112 MCG tablet TAKE 1 TABLET DAILY 90 tablet 1    meloxicam (MOBIC) 7.5 MG tablet TAKE 1 TABLET DAILY 90 tablet 3    metoprolol tartrate (LOPRESSOR) 25 MG tablet TAKE 1 TABLET TWICE A  tablet 3    rosuvastatin (CRESTOR) 10 MG tablet TAKE 1 TABLET DAILY 90 tablet 3    spironolactone (Aldactone) 25 MG tablet Take 1 tablet by mouth Daily. 90 tablet 1    valsartan (DIOVAN) 80 MG tablet TAKE 1 TABLET TWICE A  tablet 3    zolpidem (AMBIEN) 10 MG tablet Take 1 tablet by mouth At Night As Needed for Sleep. 90 tablet 0    desvenlafaxine (PRISTIQ) 50 MG 24 hr tablet TAKE 1 TABLET DAILY (Patient not taking: Reported on 2/24/2025) 90 tablet 3     No facility-administered medications prior to visit.     No opioid medication identified on active medication list. I have reviewed chart for other potential  high risk medication/s and harmful drug interactions in the  "elderly.      Aspirin is not on active medication list.  Aspirin use is not indicated based on review of current medical condition/s. Risk of harm outweighs potential benefits.  .    Patient Active Problem List   Diagnosis    Anxiety    Arthritis    Carpal tunnel syndrome of left wrist    Cervical disc herniation    Cervical radiculopathy    Cold sore    Depression    Disorder of shoulder    Facial aging    Gastric reflux    Finger numbness    Hand numbness    Hand weakness    Heart murmur    HTN (hypertension)    Low back pain    Migraine    Muscle atrophy of upper extremity    MVP (mitral valve prolapse)    Neck pain    Decreased sensation of lower extremity    Paresthesia    Seasonal allergic rhinitis    Snapping thumb syndrome    Subacromial impingement, left    Tremor    Sprain of metacarpophalangeal joint of right index finger    Avulsion fracture 5th metatarsal, left    Index Finger pain, right    Closed nondisplaced fracture of fifth right metatarsal bone    Spider veins     Advance Care Planning Advance Directive is on file.  ACP discussion was held with the patient during this visit. Patient has an advance directive in EMR which is still valid.             Objective   Vitals:    03/05/25 0737   BP: 126/72   BP Location: Right arm   Patient Position: Sitting   Cuff Size: Adult   Pulse: 73   Temp: 97.2 °F (36.2 °C)   TempSrc: Temporal   SpO2: 97%   Weight: 66.4 kg (146 lb 6.4 oz)   Height: 152.4 cm (60\")   PainSc: 2    PainLoc: Back       Estimated body mass index is 28.59 kg/m² as calculated from the following:    Height as of this encounter: 152.4 cm (60\").    Weight as of this encounter: 66.4 kg (146 lb 6.4 oz).                Does the patient have evidence of cognitive impairment? No                                                                                                Health  Risk Assessment    Smoking Status:  Social History     Tobacco Use   Smoking Status Never   Smokeless Tobacco Never "     Alcohol Consumption:  Social History     Substance and Sexual Activity   Alcohol Use Yes    Alcohol/week: 2.0 standard drinks of alcohol    Types: 1 Cans of beer, 1 Drinks containing 0.5 oz of alcohol per week    Comment: light 2018 - 1-2 beers month       Fall Risk Screen  PEPPER Fall Risk Assessment was completed, and patient is at HIGH risk for falls. Assessment completed on:3/5/2025    Depression Screening   Little interest or pleasure in doing things? Not at all   Feeling down, depressed, or hopeless? Not at all   PHQ-2 Total Score 0      Health Habits and Functional and Cognitive Screenin/26/2025     7:47 PM   Functional & Cognitive Status   Do you have difficulty preparing food and eating? No    Do you have difficulty bathing yourself, getting dressed or grooming yourself? No    Do you have difficulty using the toilet? No    Do you have difficulty moving around from place to place? No    Do you have trouble with steps or getting out of a bed or a chair? No    Current Diet Well Balanced Diet    Dental Exam Up to date    Eye Exam Up to date    Exercise (times per week) 0 times per week    Current Exercises Include No Regular Exercise    Do you need help using the phone?  No    Are you deaf or do you have serious difficulty hearing?  No    Do you need help to go to places out of walking distance? No    Do you need help shopping? No    Do you need help preparing meals?  No    Do you need help with housework?  No    Do you need help with laundry? No    Do you need help taking your medications? No    Do you need help managing money? No    Do you ever drive or ride in a car without wearing a seat belt? No    Have you felt unusual stress, anger or loneliness in the last month? No    Who do you live with? Spouse    If you need help, do you have trouble finding someone available to you? No    Have you been bothered in the last four weeks by sexual problems? No    Do you have difficulty concentrating,  remembering or making decisions? No        Patient-reported           Age-appropriate Screening Schedule:  Refer to the list below for future screening recommendations based on patient's age, sex and/or medical conditions. Orders for these recommended tests are listed in the plan section. The patient has been provided with a written plan.    Health Maintenance List  Health Maintenance   Topic Date Due    DXA SCAN  Never done    ZOSTER VACCINE (1 of 2) Never done    HEPATITIS C SCREENING  Never done    COVID-19 Vaccine (4 - 2024-25 season) 05/16/2025 (Originally 9/1/2024)    Pneumococcal Vaccine 50+ (1 of 2 - PCV) 12/02/2025 (Originally 4/5/1979)    LIPID PANEL  08/22/2025    MAMMOGRAM  02/15/2026    BMI FOLLOWUP  02/24/2026    ANNUAL WELLNESS VISIT  03/05/2026    TDAP/TD VACCINES (2 - Td or Tdap) 06/01/2026    COLORECTAL CANCER SCREENING  03/13/2027    INFLUENZA VACCINE  Completed                                                                                                                                                CMS Preventative Services Quick Reference  Risk Factors Identified During Encounter  None Identified    The above risks/problems have been discussed with the patient.  Pertinent information has been shared with the patient in the After Visit Summary.  An After Visit Summary and PPPS were made available to the patient.    Follow Up:   Next Medicare Wellness visit to be scheduled in 1 year.         Additional E&M Note during same encounter follows:  Patient has additional, significant, and separately identifiable condition(s)/problem(s) that require work above and beyond the Medicare Wellness Visit     Chief Complaint  Medicare Wellness-subsequent    Subjective   HPI  Janna is also being seen today for additional medical problem/s.  Patient presents to the office stating that she has been having increased weakness to the left upper extremity.  She states that the hand feels cold.  She states that she  "has not noticed any changes of color but she is having numbness and tingling.  Patient does have a history of carpal tunnel release of this left upper extremity.  She could not remember which nerve was entrapped.  I explained that we would obtain an EMG of her left upper extremity in conjunction of her right 1.  She verbalizes understanding.  She denies any other concerns or complaints at this time                Objective   Vital Signs:  /72 (BP Location: Right arm, Patient Position: Sitting, Cuff Size: Adult)   Pulse 73   Temp 97.2 °F (36.2 °C) (Temporal)   Ht 152.4 cm (60\")   Wt 66.4 kg (146 lb 6.4 oz)   SpO2 97%   BMI 28.59 kg/m²   Physical Exam  Constitutional:       Appearance: Normal appearance.   Cardiovascular:      Rate and Rhythm: Normal rate.   Pulmonary:      Effort: Pulmonary effort is normal.   Musculoskeletal:      Comments: Weakness to left hand, paresthesias noted to left hand   Neurological:      General: No focal deficit present.      Mental Status: She is alert and oriented to person, place, and time.                       Assessment and Plan            Numbness and tingling in both hands    Orders:    EMG & Nerve Conduction Test; Future    Medicare annual wellness visit, subsequent               Patient is scheduled for an EMG of her right upper extremity.  I explained that we would order the EMG for the left upper extremity and try to get this scheduled at the same time.      Follow Up   No follow-ups on file.  Patient was given instructions and counseling regarding her condition or for health maintenance advice. Please see specific information pulled into the AVS if appropriate.    "

## 2025-03-07 DIAGNOSIS — R20.2 NUMBNESS AND TINGLING IN BOTH HANDS: Primary | ICD-10-CM

## 2025-03-07 DIAGNOSIS — R20.0 NUMBNESS AND TINGLING IN BOTH HANDS: Primary | ICD-10-CM

## 2025-03-12 ENCOUNTER — HOSPITAL ENCOUNTER (OUTPATIENT)
Dept: MAMMOGRAPHY | Facility: HOSPITAL | Age: 65
Discharge: HOME | End: 2025-03-12
Admitting: NURSE PRACTITIONER
Payer: MEDICARE

## 2025-03-12 DIAGNOSIS — Z12.31 ENCOUNTER FOR SCREENING MAMMOGRAM FOR MALIGNANT NEOPLASM OF BREAST: ICD-10-CM

## 2025-03-12 PROCEDURE — 77063 BREAST TOMOSYNTHESIS BI: CPT

## 2025-03-12 PROCEDURE — 77067 SCR MAMMO BI INCL CAD: CPT

## 2025-03-24 ENCOUNTER — HOSPITAL ENCOUNTER (OUTPATIENT)
Facility: HOSPITAL | Age: 65
Discharge: HOME | End: 2025-03-24
Admitting: NURSE PRACTITIONER
Payer: MEDICARE

## 2025-03-24 DIAGNOSIS — R20.2 NUMBNESS AND TINGLING IN BOTH HANDS: ICD-10-CM

## 2025-03-24 DIAGNOSIS — R20.0 NUMBNESS AND TINGLING IN BOTH HANDS: ICD-10-CM

## 2025-03-24 PROCEDURE — 95886 MUSC TEST DONE W/N TEST COMP: CPT

## 2025-03-24 PROCEDURE — 95911 NRV CNDJ TEST 9-10 STUDIES: CPT

## 2025-04-03 ENCOUNTER — OFFICE VISIT (OUTPATIENT)
Dept: ORTHOPEDIC SURGERY | Facility: CLINIC | Age: 65
End: 2025-04-03
Payer: MEDICARE

## 2025-04-03 VITALS
OXYGEN SATURATION: 96 % | SYSTOLIC BLOOD PRESSURE: 158 MMHG | HEIGHT: 60 IN | DIASTOLIC BLOOD PRESSURE: 89 MMHG | BODY MASS INDEX: 28.66 KG/M2 | WEIGHT: 146 LBS | HEART RATE: 62 BPM

## 2025-04-03 DIAGNOSIS — R20.2 PARESTHESIA: Primary | ICD-10-CM

## 2025-04-03 DIAGNOSIS — I73.00 RAYNAUD'S DISEASE WITHOUT GANGRENE: ICD-10-CM

## 2025-04-03 DIAGNOSIS — G56.03 CARPAL TUNNEL SYNDROME, BILATERAL: ICD-10-CM

## 2025-04-03 RX ORDER — LIDOCAINE HYDROCHLORIDE 10 MG/ML
1 INJECTION, SOLUTION INFILTRATION; PERINEURAL
Status: COMPLETED | OUTPATIENT
Start: 2025-04-03 | End: 2025-04-03

## 2025-04-03 RX ORDER — TRIAMCINOLONE ACETONIDE 40 MG/ML
40 INJECTION, SUSPENSION INTRA-ARTICULAR; INTRAMUSCULAR
Status: COMPLETED | OUTPATIENT
Start: 2025-04-03 | End: 2025-04-03

## 2025-04-03 RX ADMIN — TRIAMCINOLONE ACETONIDE 40 MG: 40 INJECTION, SUSPENSION INTRA-ARTICULAR; INTRAMUSCULAR at 08:40

## 2025-04-03 RX ADMIN — LIDOCAINE HYDROCHLORIDE 1 ML: 10 INJECTION, SOLUTION INFILTRATION; PERINEURAL at 08:40

## 2025-04-03 NOTE — PROGRESS NOTES
"Chief Complaint  Pain and Initial Evaluation of the Left Hand and Pain and Initial Evaluation of the Right Hand       Subjective      Janna Rich presents to [facility] for an evaluation  of bilateral hand. Her hands have been bothering her for several months but denies any specific injury, trauma or falls. She had prior carpal tunnel on her left hand in the past and states this was about ten years ago. She has noticed decreased in strength to her hands. She drops things and has pain with gripping and lifting. She has a cooling sensation to her hands and states her hands stay cold.     Allergies   Allergen Reactions    Codeine Itching    Morphine Swelling    Ciprofloxacin Rash        Social History     Socioeconomic History    Marital status:    Tobacco Use    Smoking status: Never    Smokeless tobacco: Never   Vaping Use    Vaping status: Never Used   Substance and Sexual Activity    Alcohol use: Yes     Alcohol/week: 2.0 standard drinks of alcohol     Types: 1 Cans of beer, 1 Drinks containing 0.5 oz of alcohol per week     Comment: light 04/24/2018 - 1-2 beers month    Drug use: Never     Comment: denies substance abuse    Sexual activity: Yes     Partners: Male     Birth control/protection: Surgical        I reviewed the patient's chief complaint, history of present illness, review of systems, past medical history, surgical history, family history, social history, medications, and allergy list.     Review of Systems     Constitutional: Denies fevers, chills, weight loss  Cardiovascular: Denies chest pain, shortness of breath  Skin: Denies rashes, acute skin changes  Neurologic: Denies headache, loss of consciousness  MSK: bilateral hand pain       Vital Signs:   /89   Pulse 62   Ht 152.4 cm (60\")   Wt 66.2 kg (146 lb)   SpO2 96%   BMI 28.51 kg/m²            Ortho Exam    Physical Exam  General:Alert. No acute distress     Right upper extremity: positive  " compression and Tinel's, full finger range of motion, 5/5  strength, decreased sensation to the median nerve, sensation intact to the radial and ulnar nerve, positive  pulses, neurovascularly intact      Left upper extremity: well healed surgicial incision, negative  compression and Tinel's .  full finger range of motion, 5/5  strength, decreased sensation to the median nerve, sensation intact to the radial and ulnar nerve, positive  pulses, neurovascularly intact      Rigth wrist injection  Date/Time: 4/3/2025 8:40 AM  Consent given by: patient  Site marked: site marked  Timeout: Immediately prior to procedure a time out was called to verify the correct patient, procedure, equipment, support staff and site/side marked as required   Supporting Documentation  Indications: pain   Procedure Details  Location: wrist -   Needle size: 23 G  Medications administered: 1 mL lidocaine 1 %; 40 mg triamcinolone acetonide 40 MG/ML      Left wrist injection  Date/Time: 4/3/2025 8:40 AM  Consent given by: patient  Site marked: site marked  Timeout: Immediately prior to procedure a time out was called to verify the correct patient, procedure, equipment, support staff and site/side marked as required   Supporting Documentation  Indications: pain   Procedure Details  Location: wrist -   Needle size: 23 G  Medications administered: 1 mL lidocaine 1 %; 40 mg triamcinolone acetonide 40 MG/ML  Patient tolerance: patient tolerated the procedure well with no immediate complications    This injection documentation was Scribed for Selwyn Prather MD by Chary Ruby MA.  04/03/25   08:41 EDT    Imaging Results (Most Recent)       None             Result Review :       EMG & Nerve Conduction Test  Result Date: 3/28/2025  Narrative: See attached EMG/NCS report. Electronically signed by Eben Westfall PT, 03/28/25, 9:06 AM EDT.     Mammo Screening Digital Tomosynthesis Bilateral With CAD  Result Date: 3/13/2025  Narrative: MAMMO  SCREENING DIGITAL TOMOSYNTHESIS BILATERAL W CAD-  Date of Exam: 3/12/2025 5:45 PM  Indication: Screening.  Comparison: [facility] back to 9/2/2016  Technique: Routine and implant displaced screening mammogram images were obtained per protocol.  Tomosynthesis was utilized.  These mammographic images were interpreted with the assistance of a computer aided detection system.  Breast Density: There are scattered areas of fibroglandular density.  Findings: Bilateral retropectoral silicone breast implants remain in place.  No suspicious masses, suspicious microcalcifications, or architectural distortions are identified.      Impression: No mammographic evidence of malignancy.  Recommend annual screening mammography.  BI-RADS ASSESSMENT: BI-RADS 2. Benign findings.  Note:  It has been reported that there is approximately a 15% false negative rate in mammography.  Therefore, management of a palpable abnormality should not be deferred because of a negative mammogram.  3/13/2025 9:30 AM by ZOFIA SUMMERS MD on Workstation: [id number]               Assessment and Plan     Diagnoses and all orders for this visit:    1. Paresthesia (Primary)    2. Carpal tunnel syndrome, bilateral    3. Raynaud's disease, possible on the left hand        The patient presents here today for bilateral hand. EMG was discussed and reviewed with the patient today in the office that was obtained by her family doctor.     Discussed operative treatment options regarding a right carpal tunnel release versus non operative treatment options regarding injections, therapy, medications and bracing.     Patient wishes to proceed with conservative treatment options.     Discussed the risks and benefits of bilateral carpal tunnel injections today in the office. Patient expressed understanding and wishes to proceed. Patient tolerted the injection well and without any complications.     Referral placed today for vascular for further evaluation due to decrease temperature  to her left hand.     Carpal tunnel braces given to the patient today.      Call or return if worsening symptoms.    Follow Up      PRN     Patient was given instructions and counseling regarding her condition or for health maintenance advice. Please see specific information pulled into the AVS if appropriate.     Scribed for Selwyn Prather MD by Melita Yang.  04/03/25   08:02 EDT    I have personally performed the services described in this document as scribed by the above individual and it is both accurate and complete. Selwyn Prather MD 04/03/25

## 2025-04-16 ENCOUNTER — OFFICE VISIT (OUTPATIENT)
Age: 65
End: 2025-04-16
Payer: MEDICARE

## 2025-04-16 VITALS
SYSTOLIC BLOOD PRESSURE: 154 MMHG | HEART RATE: 58 BPM | OXYGEN SATURATION: 98 % | HEIGHT: 60 IN | RESPIRATION RATE: 18 BRPM | TEMPERATURE: 97.5 F | DIASTOLIC BLOOD PRESSURE: 104 MMHG | BODY MASS INDEX: 28.66 KG/M2 | WEIGHT: 146 LBS

## 2025-04-16 DIAGNOSIS — I77.1 SUBCLAVIAN ARTERY STENOSIS: Primary | ICD-10-CM

## 2025-04-16 NOTE — PROGRESS NOTES
[facility]   HISTORY AND PHYSICAL    Patient Name: Janna Rich  : 1960  MRN: [id number]  Primary Care Physician:  Shan De La Garza APRN  Date of admission: (Not on file)    Subjective   Subjective     Chief Complaint: Possible Raynaud's    HPI:    Janna Rich is a 65 y.o. female who has been having changes in her hands whereas there is decreased strength as well as she drops things and has pain with gripping and lifting.  She went to see orthopedic surgery for this.  But she also describes that sometimes her left hand will just feel cold even though there is no changing coloration.  She feels the hand is cold and if she touches the hand it also appears cold.  This may happen while at home sitting and does not seem to be specifically associated with any weather patterns.    Review of Systems    Non contributory except for the History of Present Illness    Personal History     Past Medical History:   Diagnosis Date    AC joint arthropathy 2016    Allergic     Anxiety     Arthritis     Carpal tunnel syndrome of left wrist 2016    Cervical disc herniation 2016    Cervical radiculopathy     Cervicalgia 2017    Cold sore     Decreased sensation of lower extremity 2017    Depression     Facial aging     Finger numbness 2016    Finger numbness     Gastric reflux     Hand numbness 2018    the patient notes a two year hestory of hand numbness that began in the left hand. she was diagnosed with CTS and underwent carpal turnnel release without benefitl approximately 18 months ago, she develooped numbness in faisal right hand. On exam, she has numbness 1st and 2nd digit of both hands along with numbness over the lateral aspect of the bilateral forearms. I would be concerned about a C    Hand weakness 2017    Heart murmur     High blood pressure     HTN (hypertension)     Hyperlipidemia     Hypothyroidism     Lumbago 2017    Lumbosacral disc disease     Migraine      Muscle atrophy of upper extremity 2017    MVP (mitral valve prolapse)     Night sweats     Paresthesia 2017    Seasonal allergies     Subacromial impingement, left 2016    Thoracic disc disorder     Tremor 2018    the patient is noted to have a mild tremor that could be medication related    Trigger finger of left thumb 2016    Urinary tract infection        Past Surgical History:   Procedure Laterality Date    ABDOMINAL SURGERY      Abdominalplasty (tummy tuck)    APPENDECTOMY      BREAST AUGMENTATION      BREAST SURGERY      CARPAL TUNNEL RELEASE       SECTION  ,     COLONOSCOPY  ,     ENDOSCOPY  2013    FINGER SURGERY Right 1916    trigger thumb release - Dr. Kapoor    HYSTERECTOMY      LAPAROSCOPIC CHOLECYSTECTOMY      LAPAROSCOPIC TUBAL LIGATION      TRIGGER POINT INJECTION      TUBAL ABDOMINAL LIGATION         Family History: family history includes Alcohol abuse in her brother and father; Arthritis in her father; Cancer in her father; Drug abuse in her brother and brother; Hyperlipidemia in her brother, brother, brother, and father; Hypertension in her brother and father; Lung cancer in her father. Otherwise pertinent FHx was reviewed and not pertinent to current issue.    Social History:  reports that she has never smoked. She has never used smokeless tobacco. She reports current alcohol use of about 2.0 standard drinks of alcohol per week. She reports that she does not use drugs.    Home Medications:  Current Outpatient Medications on File Prior to Visit   Medication Sig    dicyclomine (BENTYL) 20 MG tablet Take 1 tablet by mouth 2 (Two) Times a Day As Needed (cramping/diarrhea).    levothyroxine (SYNTHROID, LEVOTHROID) 112 MCG tablet TAKE 1 TABLET DAILY    meloxicam (MOBIC) 7.5 MG tablet TAKE 1 TABLET DAILY    metoprolol tartrate (LOPRESSOR) 25 MG tablet TAKE 1 TABLET TWICE A DAY    rosuvastatin (CRESTOR) 10 MG tablet TAKE 1 TABLET  DAILY    spironolactone (Aldactone) 25 MG tablet Take 1 tablet by mouth Daily.    valsartan (DIOVAN) 80 MG tablet TAKE 1 TABLET TWICE A DAY    zolpidem (AMBIEN) 10 MG tablet Take 1 tablet by mouth At Night As Needed for Sleep.    buPROPion XL (Wellbutrin XL) 150 MG 24 hr tablet Take 1 tablet by mouth Daily.    desvenlafaxine (PRISTIQ) 50 MG 24 hr tablet TAKE 1 TABLET DAILY (Patient not taking: Reported on 2/24/2025)     No current facility-administered medications on file prior to visit.          Allergies:  Allergies   Allergen Reactions    Codeine Itching    Morphine Swelling    Ciprofloxacin Rash       Objective   Objective     Vitals:   Temp:  [97.5 °F (36.4 °C)] 97.5 °F (36.4 °C)  Heart Rate:  [58] 58  Resp:  [18] 18  BP: (154)/(104) 154/104    Physical Exam    General: Awake, alert, NAD   Eyes:  MARY   Musculoskeletal:  normal motor tone, symmetric   Skin: warm, normal turgor   Neuro: strength 5/5 all extremities   Pulses: +1-2 right radial, +2 left radial.        Assessment & Plan   Assessment / Plan     Active Hospital Problems:  There are no active hospital problems to display for this patient.      Diagnoses and all orders for this visit:    1. Subclavian artery stenosis (Primary)  -     Duplex Upper Extremity Art / Grafts - Bilateral CAR; Future        Assessment/plan:   Mrs. Rich describes isolated temperature sensation changes to the left hand which do not appear to be associated with coloration changes or acute events of pain or weather patterns.  This would not appear to be Raynaud's.  I would not recommend further evaluation from that standpoint at this time.  Her pulse examination appears slightly different between the 2 radial pulses and I would recommend a baseline duplex.  She will follow-up with me after the above.      Electronically signed by Joe Abernathy MD, 04/16/25, 11:18 AM EDT.

## 2025-05-27 DIAGNOSIS — M54.50 LOW BACK PAIN, UNSPECIFIED BACK PAIN LATERALITY, UNSPECIFIED CHRONICITY, UNSPECIFIED WHETHER SCIATICA PRESENT: Primary | ICD-10-CM

## 2025-05-27 RX ORDER — CYCLOBENZAPRINE HCL 10 MG
10 TABLET ORAL 3 TIMES DAILY PRN
Qty: 30 TABLET | Refills: 0 | Status: SHIPPED | OUTPATIENT
Start: 2025-05-27

## 2025-05-27 RX ORDER — METHYLPREDNISOLONE 4 MG/1
1 TABLET ORAL DAILY
Qty: 21 TABLET | Refills: 0 | Status: SHIPPED | OUTPATIENT
Start: 2025-05-27

## 2025-06-02 DIAGNOSIS — E03.9 ACQUIRED HYPOTHYROIDISM: ICD-10-CM

## 2025-06-02 RX ORDER — LEVOTHYROXINE SODIUM 112 UG/1
112 TABLET ORAL DAILY
Qty: 90 TABLET | Refills: 1 | Status: SHIPPED | OUTPATIENT
Start: 2025-06-02

## 2025-06-05 ENCOUNTER — TELEPHONE (OUTPATIENT)
Dept: FAMILY MEDICINE CLINIC | Facility: CLINIC | Age: 65
End: 2025-06-05
Payer: MEDICARE

## 2025-06-05 ENCOUNTER — TRANSITIONAL CARE MANAGEMENT TELEPHONE ENCOUNTER (OUTPATIENT)
Dept: CALL CENTER | Facility: HOSPITAL | Age: 65
End: 2025-06-05
Payer: MEDICARE

## 2025-06-05 ENCOUNTER — READMISSION MANAGEMENT (OUTPATIENT)
Dept: CALL CENTER | Facility: HOSPITAL | Age: 65
End: 2025-06-05
Payer: MEDICARE

## 2025-06-05 NOTE — OUTREACH NOTE
Prep Survey      Flowsheet Row Responses   [facility] facility patient discharged from? Non-BH   Is LACE score < 7 ? Non-BH Discharge   Eligibility [facility]   Date of Discharge 06/03/25   Discharge diagnosis unknown   Does the patient have one of the following disease processes/diagnoses(primary or secondary)? Other   Prep survey completed? Yes            Sara Perez Registered Nurse

## 2025-06-05 NOTE — TELEPHONE ENCOUNTER
Patient was admitted to [facility] 6/2/25-6/3/25. Records are in Care Everywhere.   Please complete TCM call if eligible.

## 2025-06-05 NOTE — OUTREACH NOTE
Call Center TCM Note      Flowsheet Row Responses   [facility] patient discharged from? Non-  [[facility]]   Does the patient have one of the following disease processes/diagnoses(primary or secondary)? Other   TCM attempt successful? Yes   Call start time 1642   Call end time 1705   Discharge diagnosis rectal bleeding, colitis   Person spoke with today (if not patient) and relationship Patient   Medication alerts for this patient Flagyl, Protonix, Lidocaine patches, Oxyodone, Gabapentin and stool softeners per Hospital in [place]   Meds reviewed with patient/caregiver? Yes   Is the patient having any side effects they believe may be caused by any medication additions or changes? No   Does the patient have all medications ordered at discharge? Yes   Is the patient taking all medications as directed (includes completed medication regime)? Yes   Comments Patient has hospital followup scheduled with Shan De La Garza on 6/10.   Does the patient have an appointment with their PCP within 7-14 days of discharge? Yes   Psychosocial issues? No   Did the patient receive a copy of their discharge instructions? Yes   Nursing interventions Reviewed instructions with patient   What is the patient's perception of their health status since discharge? Improving  [Rectal bleeding has stopped. Stool is green in color . She is still having frequent stools though. Her sciatia is really flared. She cannot bear weight. She is using Tens unit. Presently on way home from [place] and anxious to see Shan De La Garza on 6/10]   Is the patient/caregiver able to teach back signs and symptoms related to disease process for when to call PCP? Yes   Is the patient/caregiver able to teach back signs and symptoms related to disease process for when to call 911? Yes   Is the patient/caregiver able to teach back the hierarchy of who to call/visit for symptoms/problems? PCP, Specialist, Home health nurse, Urgent Care, ED, 911 Yes   If the patient  is a current smoker, are they able to teach back resources for cessation? Not a smoker   TCM call completed? Yes   Call end time 1705   Would this patient benefit from a Referral to [facility] Social Work? No   Is the patient interested in additional calls from an ambulatory ? No            Carrillo BANKS - Registered Nurse    6/5/2025, 17:06 EDT

## 2025-06-09 ENCOUNTER — OFFICE VISIT (OUTPATIENT)
Dept: ORTHOPEDIC SURGERY | Facility: CLINIC | Age: 65
End: 2025-06-09
Payer: MEDICARE

## 2025-06-09 VITALS
WEIGHT: 150 LBS | BODY MASS INDEX: 29.45 KG/M2 | HEIGHT: 60 IN | SYSTOLIC BLOOD PRESSURE: 132 MMHG | HEART RATE: 81 BPM | DIASTOLIC BLOOD PRESSURE: 88 MMHG | OXYGEN SATURATION: 96 %

## 2025-06-09 DIAGNOSIS — M54.40 LOW BACK PAIN WITH SCIATICA, SCIATICA LATERALITY UNSPECIFIED, UNSPECIFIED BACK PAIN LATERALITY, UNSPECIFIED CHRONICITY: Primary | ICD-10-CM

## 2025-06-09 PROCEDURE — 3079F DIAST BP 80-89 MM HG: CPT | Performed by: PHYSICIAN ASSISTANT

## 2025-06-09 PROCEDURE — 1160F RVW MEDS BY RX/DR IN RCRD: CPT | Performed by: PHYSICIAN ASSISTANT

## 2025-06-09 PROCEDURE — 1159F MED LIST DOCD IN RCRD: CPT | Performed by: PHYSICIAN ASSISTANT

## 2025-06-09 PROCEDURE — 3075F SYST BP GE 130 - 139MM HG: CPT | Performed by: PHYSICIAN ASSISTANT

## 2025-06-09 PROCEDURE — 99213 OFFICE O/P EST LOW 20 MIN: CPT | Performed by: PHYSICIAN ASSISTANT

## 2025-06-09 NOTE — PROGRESS NOTES
Chief Complaint  Initial Evaluation of the Lower Back (Right Sciatica)    Subjective          History of Present Illness      Janna Rich is a 65 y.o. female  presents to [facility] for     Patient presents with her  Chavez for evaluation of low back pain, right side with sciatic symptoms.  She is here upon approval of Dr. Prather for evaluation of low back pain.  Patient states that she has had several episodes of back pain building up to her current state.  She states she had a fall back on 519 where she landed on her buttocks and this caused some pain to her low back.  She does have a history of low back pain.  She states a few weeks after 519 she was lifting her 7-year-old grandchild and felt that she tweaked her low back.  She was able to get a prescription for a Medrol Dosepak and Flexeril from Dr. Prather on 527 she states this did not help she states that she is recently in [place] for vacation she states that she was diagnosed with colitis she also was having severe back pain while on that trip and in that hospital admission she is being treated for the colitis and she also went in [place] received gabapentin and oxycodone she states these do not help the pain she also has a back brace for her low back with no relief and has lidocaine patches with no relief.  She states the pain in her back is the right side of her low back she states it radiates down her buttock down the side of her leg all the way down to her ankle and foot.  She denies bowel or bladder incontinence denies saddle anesthesia.  She is ambulating without a walker or a cane.      Allergies   Allergen Reactions    Codeine Itching    Morphine Swelling    Ciprofloxacin Rash        Social History     Socioeconomic History    Marital status:    Tobacco Use    Smoking status: Never    Smokeless tobacco: Never   Vaping Use    Vaping status: Never Used   Substance and Sexual Activity    Alcohol  "use: Yes     Alcohol/week: 2.0 standard drinks of alcohol     Types: 1 Cans of beer, 1 Drinks containing 0.5 oz of alcohol per week     Comment: light 04/24/2018 - 1-2 beers month    Drug use: Never     Comment: denies substance abuse    Sexual activity: Yes     Partners: Male     Birth control/protection: Surgical        REVIEW OF SYSTEMS    Constitutional: Awake alert and oriented x3, no acute distress, denies fevers, chills, weight loss  Respiratory: No respiratory distress  Vascular: Brisk cap refill, Intact distal pulses, No cyanosis, compartments soft with no signs or symptoms of compartment syndrome or DVT.   Cardiovascular: Denies chest pain, shortness of breath  Skin: Denies rashes, acute skin changes  Neurologic: Denies headache, loss of consciousness  MSK: Low back pain      Objective   Vital Signs:   /88   Pulse 81   Ht 152.4 cm (60\")   Wt 68 kg (150 lb)   SpO2 96%   BMI 29.29 kg/m²     Body mass index is 29.29 kg/m².    Physical Exam       No midline tenderness of the lumbar spine, right sided paraspinal muscle tenderness and tenderness to palpation of the right buttocks lateral hip down the side of the patient leg.  No decrease sensation to the feet ankles medial calf region medial knee region or lateral areas of her lower extremities.  Pain with straight leg raise.  Patient will wiggle toes knee hip ankle and foot range of motion appropriate, strength appropriate 5 out of 5.      Procedures    Imaging Results (Most Recent)       None                   Assessment and Plan    Diagnoses and all orders for this visit:    1. Low back pain with sciatica, sciatica laterality unspecified, unspecified back pain laterality, unspecified chronicity (Primary)  -     Ambulatory Referral to Neurosurgery  -     MRI Lumbar Spine Without Contrast; Future        Discussed diagnosis and treatment options with the patient and her  per Dr. Prather's request patient will have stat MRI of the lumbar " spine ordered as well as urgent referral to Dr. Genaro Souza for urgent evaluation, follow-up as needed with our office follow-up to review MRI with Dr. Souza and his team.  Continue medicines as prescribed by previous physicians for pain.  Patient will also see her primary care physician tomorrow for further evaluation    Call or return if worsening symptoms.    Follow Up   Return if symptoms worsen or fail to improve.  Patient was given instructions and counseling regarding her condition or for health maintenance advice. Please see specific information pulled into the AVS if appropriate.       EMR Dragon/Transcription disclaimer:  Part of this note may be an electronic transcription/translation of spoken language to printed text using the Dragon Dictation System

## 2025-06-10 ENCOUNTER — OFFICE VISIT (OUTPATIENT)
Dept: FAMILY MEDICINE CLINIC | Facility: CLINIC | Age: 65
End: 2025-06-10
Payer: MEDICARE

## 2025-06-10 ENCOUNTER — TELEPHONE (OUTPATIENT)
Dept: GASTROENTEROLOGY | Facility: CLINIC | Age: 65
End: 2025-06-10
Payer: MEDICARE

## 2025-06-10 ENCOUNTER — LAB (OUTPATIENT)
Dept: LAB | Facility: HOSPITAL | Age: 65
End: 2025-06-10
Payer: MEDICARE

## 2025-06-10 ENCOUNTER — PREP FOR SURGERY (OUTPATIENT)
Dept: OTHER | Facility: HOSPITAL | Age: 65
End: 2025-06-10
Payer: MEDICARE

## 2025-06-10 VITALS
HEIGHT: 60 IN | DIASTOLIC BLOOD PRESSURE: 100 MMHG | WEIGHT: 150.6 LBS | TEMPERATURE: 96.4 F | HEART RATE: 74 BPM | SYSTOLIC BLOOD PRESSURE: 142 MMHG | BODY MASS INDEX: 29.57 KG/M2 | OXYGEN SATURATION: 98 %

## 2025-06-10 DIAGNOSIS — K62.5 RECTAL BLEEDING: ICD-10-CM

## 2025-06-10 DIAGNOSIS — K55.9 ISCHEMIC COLITIS: Primary | ICD-10-CM

## 2025-06-10 DIAGNOSIS — K55.9 ISCHEMIC COLITIS: ICD-10-CM

## 2025-06-10 DIAGNOSIS — M54.41 CHRONIC RIGHT-SIDED LOW BACK PAIN WITH RIGHT-SIDED SCIATICA: ICD-10-CM

## 2025-06-10 DIAGNOSIS — E87.1 HYPONATREMIA: ICD-10-CM

## 2025-06-10 DIAGNOSIS — K62.5 RECTAL BLEEDING: Primary | ICD-10-CM

## 2025-06-10 DIAGNOSIS — R19.7 DIARRHEA, UNSPECIFIED TYPE: ICD-10-CM

## 2025-06-10 DIAGNOSIS — G89.29 CHRONIC RIGHT-SIDED LOW BACK PAIN WITH RIGHT-SIDED SCIATICA: ICD-10-CM

## 2025-06-10 LAB
ALBUMIN SERPL-MCNC: 4.1 G/DL (ref 3.5–5.2)
ALBUMIN/GLOB SERPL: 1.6 G/DL
ALP SERPL-CCNC: 59 U/L (ref 39–117)
ALT SERPL W P-5'-P-CCNC: 30 U/L (ref 1–33)
ANION GAP SERPL CALCULATED.3IONS-SCNC: 9.3 MMOL/L (ref 5–15)
AST SERPL-CCNC: 34 U/L (ref 1–32)
BILIRUB SERPL-MCNC: 0.3 MG/DL (ref 0–1.2)
BUN SERPL-MCNC: 9 MG/DL (ref 8–23)
BUN/CREAT SERPL: 8.8 (ref 7–25)
CALCIUM SPEC-SCNC: 9.6 MG/DL (ref 8.6–10.5)
CHLORIDE SERPL-SCNC: 95 MMOL/L (ref 98–107)
CO2 SERPL-SCNC: 30.7 MMOL/L (ref 22–29)
CREAT SERPL-MCNC: 1.02 MG/DL (ref 0.57–1)
DEPRECATED RDW RBC AUTO: 49.7 FL (ref 37–54)
EGFRCR SERPLBLD CKD-EPI 2021: 60.4 ML/MIN/1.73
ERYTHROCYTE [DISTWIDTH] IN BLOOD BY AUTOMATED COUNT: 14.1 % (ref 12.3–15.4)
GLOBULIN UR ELPH-MCNC: 2.6 GM/DL
GLUCOSE SERPL-MCNC: 93 MG/DL (ref 65–99)
HCT VFR BLD AUTO: 41.4 % (ref 34–46.6)
HGB BLD-MCNC: 13.3 G/DL (ref 12–15.9)
IRON 24H UR-MRATE: 66 MCG/DL (ref 37–145)
IRON SATN MFR SERPL: 17 % (ref 20–50)
MCH RBC QN AUTO: 30.8 PG (ref 26.6–33)
MCHC RBC AUTO-ENTMCNC: 32.1 G/DL (ref 31.5–35.7)
MCV RBC AUTO: 95.8 FL (ref 79–97)
PLATELET # BLD AUTO: 322 10*3/MM3 (ref 140–450)
PMV BLD AUTO: 10.2 FL (ref 6–12)
POTASSIUM SERPL-SCNC: 3.5 MMOL/L (ref 3.5–5.2)
PROT SERPL-MCNC: 6.7 G/DL (ref 6–8.5)
RBC # BLD AUTO: 4.32 10*6/MM3 (ref 3.77–5.28)
SODIUM SERPL-SCNC: 135 MMOL/L (ref 136–145)
TIBC SERPL-MCNC: 381 MCG/DL (ref 298–536)
TRANSFERRIN SERPL-MCNC: 256 MG/DL (ref 200–360)
WBC NRBC COR # BLD AUTO: 6.03 10*3/MM3 (ref 3.4–10.8)

## 2025-06-10 PROCEDURE — 84466 ASSAY OF TRANSFERRIN: CPT

## 2025-06-10 PROCEDURE — 85027 COMPLETE CBC AUTOMATED: CPT

## 2025-06-10 PROCEDURE — 83540 ASSAY OF IRON: CPT

## 2025-06-10 PROCEDURE — 36415 COLL VENOUS BLD VENIPUNCTURE: CPT

## 2025-06-10 PROCEDURE — 80053 COMPREHEN METABOLIC PANEL: CPT

## 2025-06-10 RX ORDER — GABAPENTIN 100 MG/1
200 CAPSULE ORAL 2 TIMES DAILY
COMMUNITY
End: 2025-06-10 | Stop reason: ALTCHOICE

## 2025-06-10 RX ORDER — GABAPENTIN 300 MG/1
300 CAPSULE ORAL 3 TIMES DAILY
Qty: 90 CAPSULE | Refills: 0 | Status: SHIPPED | OUTPATIENT
Start: 2025-06-10 | End: 2025-06-10

## 2025-06-10 RX ORDER — LIDOCAINE 50 MG/G
1 PATCH TOPICAL EVERY 24 HOURS
Qty: 30 PATCH | Refills: 5 | Status: SHIPPED | OUTPATIENT
Start: 2025-06-10

## 2025-06-10 RX ORDER — LIDOCAINE 50 MG/G
1 PATCH TOPICAL EVERY 24 HOURS
COMMUNITY
End: 2025-06-10 | Stop reason: SDUPTHER

## 2025-06-10 RX ORDER — PANTOPRAZOLE SODIUM 40 MG/1
40 TABLET, DELAYED RELEASE ORAL DAILY
COMMUNITY

## 2025-06-10 RX ORDER — SODIUM, POTASSIUM,MAG SULFATES 17.5-3.13G
1 SOLUTION, RECONSTITUTED, ORAL ORAL EVERY 12 HOURS
Qty: 354 ML | Refills: 0 | Status: SHIPPED | OUTPATIENT
Start: 2025-06-10

## 2025-06-10 RX ORDER — OXYCODONE AND ACETAMINOPHEN 5; 325 MG/1; MG/1
1 TABLET ORAL EVERY 6 HOURS PRN
COMMUNITY

## 2025-06-10 RX ORDER — GABAPENTIN 300 MG/1
300 CAPSULE ORAL 3 TIMES DAILY
Qty: 90 CAPSULE | Refills: 0 | Status: SHIPPED | OUTPATIENT
Start: 2025-06-10

## 2025-06-10 NOTE — TELEPHONE ENCOUNTER
S/w pt she is agreeable to complete scope Monday 7/28/2025     Orders pending.     Instructions to be sent via [employer]       No clearances required

## 2025-06-10 NOTE — TELEPHONE ENCOUNTER
Contacted by PCP.  Pt recently in [place] and diagnosed with ischemic colitis.  She was recommended to have colonoscopy in  6 - 8 weeks.  OK to schedule.  Thanks

## 2025-06-10 NOTE — PROGRESS NOTES
"Chief Complaint  Transitional Care Management    Subjective         Janna Rich presents to [facility]  Patient presents to the office for a follow-up regarding a recent admission to a hospital in [place].  Patient was initially admitted for rectal bleeding and abdominal pain.  She was diagnosed with ischemic colitis.  She was given antibiotics.  I did review her lab work and her H&H had went down.  This was not rechecked before being discharged.  I explained that she also had hyponatremia and hypokalemia.  We will recheck her labs to ensure that these are returning to baseline.  It was recommended that the patient have a colonoscopy in 6 to 8 weeks.  Patient does request Dr. Valverde.  I explained that we would reach out to her office to ensure that she is followed up with within this timeframe.  Patient continues to have persistent diarrhea.  Also explained that I was going to check a stool culture to further evaluate  Patient did see orthopedics yesterday secondary to low back pain.  She states that she is struggling with the pain and states that the pain medication is not helping.  She was given gabapentin 100 mg capsules to take but states that this is not helping.  I did discuss increasing the dosage to 300 mg 3 times a day.           Objective     Vitals:    06/10/25 1056   BP: 142/100   BP Location: Right arm   Patient Position: Sitting   Cuff Size: Adult   Pulse: 74   Temp: 96.4 °F (35.8 °C)   TempSrc: Temporal   SpO2: 98%   Weight: 68.3 kg (150 lb 9.6 oz)   Height: 152.4 cm (60\")      Body mass index is 29.41 kg/m².             Physical Exam  Vitals reviewed.   Constitutional:       Appearance: Normal appearance.   Cardiovascular:      Rate and Rhythm: Normal rate and regular rhythm.      Pulses: Normal pulses.      Heart sounds: Normal heart sounds, S1 normal and S2 normal. No murmur heard.  Pulmonary:      Effort: Pulmonary effort is normal. No respiratory distress. "      Breath sounds: Normal breath sounds.   Skin:     General: Skin is warm and dry.   Neurological:      Mental Status: She is alert and oriented to person, place, and time.   Psychiatric:         Attention and Perception: Attention normal.         Mood and Affect: Mood normal.         Behavior: Behavior normal.         Result Review :   The following data was reviewed by: BELLE Worthy on 06/10/2025:      Procedures    Assessment and Plan   Diagnoses and all orders for this visit:    1. Rectal bleeding (Primary)  -     CBC (No Diff); Future  -     Comprehensive Metabolic Panel; Future  -     Iron and TIBC; Future    2. Hyponatremia  -     Comprehensive Metabolic Panel; Future    3. Diarrhea, unspecified type  -     Enteric Bacterial Panel - Stool, Per Rectum; Future    4. Ischemic colitis  -     Ambulatory Referral to Gastroenterology    5. Chronic right-sided low back pain with right-sided sciatica  -     gabapentin (NEURONTIN) 300 MG capsule; Take 1 capsule by mouth 3 (Three) Times a Day.  Dispense: 90 capsule; Refill: 0    Other orders  -     Discontinue: gabapentin (NEURONTIN) 300 MG capsule; Take 1 capsule by mouth 3 (Three) Times a Day.  Dispense: 90 capsule; Refill: 0          Follow Up   Return if symptoms worsen or fail to improve, for Next scheduled follow up.  Patient was given instructions and counseling regarding her condition or for health maintenance advice. Please see specific information pulled into the AVS if appropriate.

## 2025-06-11 ENCOUNTER — HOSPITAL ENCOUNTER (OUTPATIENT)
Dept: MRI IMAGING | Facility: HOSPITAL | Age: 65
Discharge: HOME | End: 2025-06-11
Admitting: PHYSICIAN ASSISTANT
Payer: MEDICARE

## 2025-06-11 DIAGNOSIS — M54.40 LOW BACK PAIN WITH SCIATICA, SCIATICA LATERALITY UNSPECIFIED, UNSPECIFIED BACK PAIN LATERALITY, UNSPECIFIED CHRONICITY: ICD-10-CM

## 2025-06-11 PROCEDURE — 72148 MRI LUMBAR SPINE W/O DYE: CPT

## 2025-06-12 ENCOUNTER — OFFICE VISIT (OUTPATIENT)
Dept: NEUROSURGERY | Facility: CLINIC | Age: 65
End: 2025-06-12
Payer: MEDICARE

## 2025-06-12 ENCOUNTER — TELEPHONE (OUTPATIENT)
Dept: GASTROENTEROLOGY | Facility: CLINIC | Age: 65
End: 2025-06-12
Payer: MEDICARE

## 2025-06-12 VITALS
HEIGHT: 60 IN | WEIGHT: 151.1 LBS | BODY MASS INDEX: 29.67 KG/M2 | SYSTOLIC BLOOD PRESSURE: 130 MMHG | DIASTOLIC BLOOD PRESSURE: 87 MMHG

## 2025-06-12 DIAGNOSIS — M51.26 HERNIATED NUCLEUS PULPOSUS, L3-4 RIGHT: Primary | ICD-10-CM

## 2025-06-12 NOTE — H&P (VIEW-ONLY)
"Chief Complaint  Back Pain    Subjective            Janna Rich who is a 65 y.o. year old female who presents to [facility] for Evaluation of the Spine.     History of Present Illness  The patient is a 65-year-old female who presents for evaluation of right leg pain.    She has been experiencing back pain, which she has been managing. Approximately 2 to 3 weeks ago, she developed pain radiating down her right leg following a sudden twist in her back. The pain, initially localized to her back, extended to her buttock and leg during a trip to [place]. She describes the pain as \"going down my buttock, down my leg and he turns courses and he goes down there.\" She rates her pain as 6 on a scale of 0 to 10 with gabapentin, and 20 without it. She also reports difficulty in lifting her foot to the edge of the tub for shaving, necessitating manual assistance. Weakness in the leg is noted, affecting her ability to perform specific movements. She was prescribed gabapentin during a hospital stay in [place], which she started taking on 06/03/2025. Initially, she was taking 200 mg twice daily, but this was increased to 300 mg three times daily due to inadequate relief. She has also been taking Flexeril. Additionally, she reports experiencing bowel incontinence today.    She experienced a gastrointestinal bleed while on Mobic 7.5 mg once daily. [place] vas[place] the [place], resulting in loss of bowel control. She did not lose consciousness. Upon returning home, she experienced severe back pain and had another bowel movement, which was maroon in color. She continued to have bowel movements 15 to 20 times, prompting a hospital visit. A CT scan revealed ischemic colitis. She was treated with Protonix and antibiotics, which resolved the bleeding but not the diarrhea. She is currently on Protonix.      This patient  reports that she has never smoked. She has never used " "smokeless tobacco.    Review of Systems   Musculoskeletal:  Positive for back pain and myalgias.        Objective   Vital Signs:   /87 (BP Location: Left arm, Patient Position: Sitting)   Ht 152.4 cm (60\")   Wt 68.5 kg (151 lb 1.6 oz)   BMI 29.51 kg/m²       Physical Exam  Constitutional:       Comments: BMI 29.4   Cardiovascular:      Comments: No notable edema   Pulmonary:      Effort: Pulmonary effort is normal.   Neurological:      Mental Status: She is alert.      Sensory: No sensory deficit.      Motor: Weakness (right quad 4/5) present.      Comments: Neg SLR   Psychiatric:         Mood and Affect: Mood normal.          Result Review :   I personally interpreted the patient's MRI scan with the patient.     Results  Imaging   - MRI of the lumbar spine: MRI shows a good size disk herniation at L3-4, likely causing right leg pain.        Assessment and Plan    Diagnoses and all orders for this visit:    1. Herniated nucleus pulposus, L3-4 right (Primary)  -     Ambulatory Referral to Pain Management Clinic        Assessment & Plan  1. Right leg pain.  The patient's right leg pain is likely due to a herniated disc at the L3-L4 level, which is impinging on the L4 nerve root. This diagnosis is supported by the absence of significant spinal stenosis and the presence of a large disc herniation. The pain has been present for approximately 2 weeks. Weakness in the right leg is a concern and could be related to the herniated disc. Its progression or improvement will guide further treatment decisions. Bowel incontinence is noted but not explained by the current spinal findings. The patient has been on gabapentin, which has provided some relief, with the dosage recently increased to 300 mg three times a day. Flexeril is also being used as a muscle relaxer. The patient was informed that 80% of disc herniations improve within 4 to 6 weeks without surgical intervention. Given the presence of weakness, other " treatment options were discussed, including increasing the dose of gabapentin, administering a lumbar epidural steroid injection to reduce nerve inflammation and sciatica, and surgical removal of the herniated disc fragment. The patient expressed a preference for the injection. An urgent referral for a lumbar epidural steroid injection will be made. If the weakness progresses, surgical intervention may be considered.    2. Gastrointestinal bleed.  The patient experienced a gastrointestinal bleed, likely due to the use of Mobic and a Medrol pack. Treatment included Protonix and antibiotics, which resolved the bleeding but not the diarrhea. The patient is currently on Protonix. Further evaluation with a colonoscopy has been arranged to investigate ongoing gastrointestinal symptoms.       Follow Up   Return in about 4 weeks (around 7/10/2025).  Patient was given instructions and counseling regarding her condition or for health maintenance advice. Please see specific information pulled into the AVS if appropriate.     Patient or patient representative verbalized consent for the use of Ambient Listening during the visit with  Genaro Souza MD for chart documentation. 6/12/2025  11:23 EDT    Addendum:  Patient called back with worsening pain and has opted for discectomy.

## 2025-06-12 NOTE — PROGRESS NOTES
"Chief Complaint  Back Pain    Subjective            Janna Rich who is a 65 y.o. year old female who presents to [facility] for Evaluation of the Spine.     History of Present Illness  The patient is a 65-year-old female who presents for evaluation of right leg pain.    She has been experiencing back pain, which she has been managing. Approximately 2 to 3 weeks ago, she developed pain radiating down her right leg following a sudden twist in her back. The pain, initially localized to her back, extended to her buttock and leg during a trip to [place]. She describes the pain as \"going down my buttock, down my leg and he turns courses and he goes down there.\" She rates her pain as 6 on a scale of 0 to 10 with gabapentin, and 20 without it. She also reports difficulty in lifting her foot to the edge of the tub for shaving, necessitating manual assistance. Weakness in the leg is noted, affecting her ability to perform specific movements. She was prescribed gabapentin during a hospital stay in [place], which she started taking on 06/03/2025. Initially, she was taking 200 mg twice daily, but this was increased to 300 mg three times daily due to inadequate relief. She has also been taking Flexeril. Additionally, she reports experiencing bowel incontinence today.    She experienced a gastrointestinal bleed while on Mobic 7.5 mg once daily. She had a vasovagal response at the beach, resulting in loss of bowel control. She did not lose consciousness. Upon returning home, she experienced severe back pain and had another bowel movement, which was maroon in color. She continued to have bowel movements 15 to 20 times, prompting a hospital visit. A CT scan revealed ischemic colitis. She was treated with Protonix and antibiotics, which resolved the bleeding but not the diarrhea. She is currently on Protonix.      This patient  reports that she has never smoked. She has never used " "smokeless tobacco.    Review of Systems   Musculoskeletal:  Positive for back pain and myalgias.        Objective   Vital Signs:   /87 (BP Location: Left arm, Patient Position: Sitting)   Ht 152.4 cm (60\")   Wt 68.5 kg (151 lb 1.6 oz)   BMI 29.51 kg/m²       Physical Exam  Constitutional:       Comments: BMI 29.4   Cardiovascular:      Comments: No notable edema   Pulmonary:      Effort: Pulmonary effort is normal.   Neurological:      Mental Status: She is alert.      Sensory: No sensory deficit.      Motor: Weakness (right quad 4/5) present.      Comments: Neg SLR   Psychiatric:         Mood and Affect: Mood normal.          Result Review :   I personally interpreted the patient's MRI scan with the patient.     Results  Imaging   - MRI of the lumbar spine: MRI shows a good size disk herniation at L3-4, likely causing right leg pain.        Assessment and Plan    Diagnoses and all orders for this visit:    1. Herniated nucleus pulposus, L3-4 right (Primary)  -     Ambulatory Referral to Pain Management Clinic        Assessment & Plan  1. Right leg pain.  The patient's right leg pain is likely due to a herniated disc at the L3-L4 level, which is impinging on the L4 nerve root. This diagnosis is supported by the absence of significant spinal stenosis and the presence of a large disc herniation. The pain has been present for approximately 2 weeks. Weakness in the right leg is a concern and could be related to the herniated disc. Its progression or improvement will guide further treatment decisions. Bowel incontinence is noted but not explained by the current spinal findings. The patient has been on gabapentin, which has provided some relief, with the dosage recently increased to 300 mg three times a day. Flexeril is also being used as a muscle relaxer. The patient was informed that 80% of disc herniations improve within 4 to 6 weeks without surgical intervention. Given the presence of weakness, other " treatment options were discussed, including increasing the dose of gabapentin, administering a lumbar epidural steroid injection to reduce nerve inflammation and sciatica, and surgical removal of the herniated disc fragment. The patient expressed a preference for the injection. An urgent referral for a lumbar epidural steroid injection will be made. If the weakness progresses, surgical intervention may be considered.    2. Gastrointestinal bleed.  The patient experienced a gastrointestinal bleed, likely due to the use of Mobic and a Medrol pack. Treatment included Protonix and antibiotics, which resolved the bleeding but not the diarrhea. The patient is currently on Protonix. Further evaluation with a colonoscopy has been arranged to investigate ongoing gastrointestinal symptoms.       Follow Up   Return in about 4 weeks (around 7/10/2025).  Patient was given instructions and counseling regarding her condition or for health maintenance advice. Please see specific information pulled into the AVS if appropriate.     Patient or patient representative verbalized consent for the use of Ambient Listening during the visit with  Genaro Souza MD for chart documentation. 6/12/2025  11:23 EDT

## 2025-06-12 NOTE — TELEPHONE ENCOUNTER
ENDO RECONCILIATION  Verify source of procedure(s): Other  If other, please list source: TE 6/10/25    TIME OUT-CONFIRM CORRECT PROCEDURE: Colonoscopy  Cardiology:  Pulmonology:  Blood thinner:   GLP-1:  Additional DX/indication for procedure:     Please include any other notes relevant to endo reconciliation:  N/A

## 2025-06-13 RX ORDER — ALBUTEROL SULFATE 90 UG/1
2 INHALANT RESPIRATORY (INHALATION) EVERY 4 HOURS PRN
Qty: 18 G | Refills: 2 | Status: SHIPPED | OUTPATIENT
Start: 2025-06-13

## 2025-06-17 PROBLEM — M51.26 HERNIATED NUCLEUS PULPOSUS, L3-4 RIGHT: Status: ACTIVE | Noted: 2025-06-12

## 2025-06-23 ENCOUNTER — OFFICE VISIT (OUTPATIENT)
Age: 65
End: 2025-06-23
Payer: MEDICARE

## 2025-06-23 ENCOUNTER — HOSPITAL ENCOUNTER (OUTPATIENT)
Dept: CARDIOLOGY | Facility: HOSPITAL | Age: 65
Discharge: HOME | End: 2025-06-23
Admitting: SURGERY
Payer: MEDICARE

## 2025-06-23 VITALS
RESPIRATION RATE: 18 BRPM | TEMPERATURE: 97.7 F | SYSTOLIC BLOOD PRESSURE: 119 MMHG | OXYGEN SATURATION: 99 % | DIASTOLIC BLOOD PRESSURE: 71 MMHG | HEART RATE: 77 BPM

## 2025-06-23 DIAGNOSIS — I77.1 SUBCLAVIAN ARTERY STENOSIS: ICD-10-CM

## 2025-06-23 DIAGNOSIS — I77.1 SUBCLAVIAN ARTERY STENOSIS: Primary | ICD-10-CM

## 2025-06-23 LAB
BH CV UPPER ARTERIAL LEFT WBI RATIO: 1.1
BH CV UPPER ARTERIAL RIGHT WBI RATIO: 1.1
BH CV UPPER DUPLEX LEFT AXILLARY ART EDV: 0 CM/S
BH CV UPPER DUPLEX LEFT AXILLARY ART PSV: 78.5 CM/S
BH CV UPPER DUPLEX LEFT BRACHIAL ART EDV: 0 CM/S
BH CV UPPER DUPLEX LEFT BRACHIAL ART PSV: 83.7 CM/S
BH CV UPPER DUPLEX LEFT RADIAL ART EDV: 0 CM/S
BH CV UPPER DUPLEX LEFT RADIAL ART PSV: 52.4 CM/S
BH CV UPPER DUPLEX LEFT SUBCLAVIAN  ART PSV: 63.6 CM/S
BH CV UPPER DUPLEX LEFT SUBCLAVIAN ART EDV: 0 CM/S
BH CV UPPER DUPLEX LEFT ULNAR ART EDV: 0 CM/S
BH CV UPPER DUPLEX LEFT ULNAR ARTERY PSV: 38 CM/S
BH CV UPPER DUPLEX RIGHT AXILLARY ART EDV: 0 CM/S
BH CV UPPER DUPLEX RIGHT AXILLARY ARTERY PSV: 82.1 CM/S
BH CV UPPER DUPLEX RIGHT BRACHIA ART EDV: 0 CM/S
BH CV UPPER DUPLEX RIGHT BRACHIAL ART PSV: 84.1 CM/S
BH CV UPPER DUPLEX RIGHT RADIAL ART EDV: 0 CM/S
BH CV UPPER DUPLEX RIGHT RADIAL ART PSV: 48.5 CM/S
BH CV UPPER DUPLEX RIGHT SUBCLAVIAN ART EDV: 0 CM/S
BH CV UPPER DUPLEX RIGHT SUBCLAVIAN ART PSV: 95.7 CM/S
BH CV UPPER DUPLEX RIGHT ULNAR ART EDV: 0 CM/S
BH CV UPPER DUPLEX RIGHT ULNAR ART PSV: 53.9 CM/S
UPPER ARTERIAL LEFT ARM BRACHIAL SYS MAX: 118
UPPER ARTERIAL LEFT ARM RADIAL SYS MAX: 130
UPPER ARTERIAL LEFT ARM ULNAR SYS MAX: 133
UPPER ARTERIAL RIGHT ARM BRACHIAL SYS MAX: 119
UPPER ARTERIAL RIGHT ARM RADIAL SYS MAX: 133
UPPER ARTERIAL RIGHT ARM ULNAR SYS MAX: 130

## 2025-06-23 PROCEDURE — 1160F RVW MEDS BY RX/DR IN RCRD: CPT | Performed by: SURGERY

## 2025-06-23 PROCEDURE — 3074F SYST BP LT 130 MM HG: CPT | Performed by: SURGERY

## 2025-06-23 PROCEDURE — 99212 OFFICE O/P EST SF 10 MIN: CPT | Performed by: SURGERY

## 2025-06-23 PROCEDURE — 93930 UPPER EXTREMITY STUDY: CPT

## 2025-06-23 PROCEDURE — 3078F DIAST BP <80 MM HG: CPT | Performed by: SURGERY

## 2025-06-23 PROCEDURE — 93930 UPPER EXTREMITY STUDY: CPT | Performed by: SURGERY

## 2025-06-23 PROCEDURE — 1159F MED LIST DOCD IN RCRD: CPT | Performed by: SURGERY

## 2025-06-23 RX ORDER — SPIRONOLACTONE 25 MG/1
25 TABLET ORAL DAILY
COMMUNITY

## 2025-06-23 RX ORDER — GABAPENTIN 400 MG/1
400 CAPSULE ORAL 3 TIMES DAILY
COMMUNITY

## 2025-06-23 NOTE — PROGRESS NOTES
[facility]   Follow up Office    Patient Name: Janna Rich  : 1960  MRN: [id number]  Primary Care Physician:  Shan De La Garza APRN      Subjective   Subjective     HPI:    Janna Rich is a 65 y.o. female here for follow-up after a bilateral upper extremity arterial duplex obtained as a baseline due to a slight difference in radial pulses.  She is doing well otherwise no new complaints.      Objective     Vitals:   Temp:  [97.7 °F (36.5 °C)] 97.7 °F (36.5 °C)  Heart Rate:  [77] 77  Resp:  [18] 18  BP: (118-119)/(71-75) 119/71    Physical Exam      General: Alert, no acute distress  Extremities: Symmetric.  Neuro: No gross deficits    Diagnostic studies: A bilateral upper extremity arterial duplex demonstrates WBI's of 1.1 bilaterally.  There is a slight difference in the velocities at the subclavian level with 95.7 cm/s on the right, 63.6 cm/s on the left.    Assessment & Plan   Assessment / Plan     Diagnoses and all orders for this visit:    1. Subclavian artery stenosis (Primary)       Assessment/Plan:   Possible mild subclavian artery stenosis, asymptomatic.  Follow-up with a repeat duplex in 1 year.        Electronically signed by Joe Abernathy MD, 25, 9:38 AM EDT.

## 2025-06-23 NOTE — PRE-PROCEDURE INSTRUCTIONS
IMPORTANT INSTRUCTIONS - PRE-ADMISSION TESTING  DO NOT EAT OR CHEW anything after midnight the night before your procedure.    You may have CLEAR liquids up to __2__ hours prior to ARRIVAL time.   Take the following medications the morning of your procedure with JUST A SIP OF WATER:  METOPROLOL, PANTOPRAZOLE, GABAPENTIN , ALBUTEROL _______________________________________________________________________________________________________________________________________________________________________________________    DO NOT BRING your medications to the hospital with you, UNLESS something has changed since your PRE-Admission Testing appointment.  Hold all vitamins, supplements, and NSAIDS (Non- steroidal anti-inflammatory meds) for one week prior to surgery (you MAY take Tylenol or Acetaminophen).  If you are diabetic, check your blood sugar the morning of your procedure. If it is less than 70 or if you are feeling symptomatic, call the following number for further instructions: [phone]  Use your inhalers/nebulizers as usual, the morning of your procedure. BRING YOUR INHALERS with you.   Bring your CPAP or BIPAP to hospital, ONLY IF YOU WILL BE SPENDING THE NIGHT.   Make sure you have a ride home and have someone who will stay with you the day of your procedure after you go home.  If you have any questions, please call your Pre-Admission Testing Nurse, SABINO at [phone]__.   Per anesthesia request, do not smoke for 24 hours before your procedure or as instructed by your surgeon.    Clear Liquid Diet        Find out when you need to start a clear liquid diet.   Think of “clear liquids” as anything you could read a newspaper through. This includes things like water, broth, sports drinks, or tea WITHOUT any kind of milk or cream.           Once you are told to start a clear liquid diet, only drink these things until 2 hours before arrival to the hospital or when the hospital says to stop. Total volume  limitation: 8 oz.       Clear liquids you CAN drink:   Water   Clear broth: beef, chicken, vegetable, or bone broth with nothing in it   Gatorade   Lemonade or Ar-aid   Soda   Tea, coffee (NO cream or honey)   Jell-O (without fruit)   Popsicles (without fruit or cream)   Italian ices   Juice without pulp: apple, white, grape   You may use salt, pepper, and sugar    Do NOT drink:   Milk or cream   Soy milk, almond milk, coconut milk, or other non-dairy drinks and   creamers   Milkshakes or smoothies   Tomato juice   Orange juice   Grapefruit juice   Cream soups or any other than broth         Clear Liquid Diet:  Do NOT eat any solid food.  Do NOT eat or suck on mints or candy.  Do NOT chew gum.  Do NOT drink thick liquids like milk or juice with pulp in it.  Do NOT add milk, cream, or anything like soy milk or almond milk to coffee or tea.       PREOPERATIVE (BEFORE SURGERY)              BATHING INSTRUCTIONS  Instructions:    You will need to shower 1 2 3 separate times utilizing the soap provided; at the times indicated   below:     - AM PM   - AM PM   - AM PM      Wash your hair and face with normal shampoo and soap, rinse it well before using the surgical soap.      In the shower, wet the skin completely with water from your neck to your feet. Apply the cleanser to your   body ONLY FROM THE NECK TO YOUR FEET.     Do NOT USE THE CLEANSER ON YOUR FACE, HEAD, OR GENITAL (PRIVATE) AREAS.   Keep it out of your eyes, ears, and mouth because of the risk of injury to those areas.      Scrub with a clean washcloth for each bath utilizing the soap provided from the top of your body to the   bottom starting at the neck area.      Pay close attention to your armpits, groin area, and the site of surgery.      Wash your body gently for 5 minutes. Stand outside the stream or turn off the water while scrubbing your   body. Do NOT wash with your regular soap after the surgical cleanser is used.      RINSE THE CLEANSER OFF  COMPLETELY with plenty of water. Rinse the area again thoroughly.      Dry off with a clean towel. The surgical soap can cause dryness; however do NOT APPLY LOTION,   CREAM, POWDER, and/or DEODORANT AFTER SHOWERING.     Be sure to where clean clothes after showering.      Ensure CLEAN BED LINENS AFTER FIRST wash with the surgical soap.      NO PETS ALLOWED IN THE BED with you after utilizing the surgical soap.

## 2025-06-24 RX ORDER — NITROFURANTOIN 25; 75 MG/1; MG/1
100 CAPSULE ORAL 2 TIMES DAILY
Qty: 14 CAPSULE | Refills: 0 | Status: SHIPPED | OUTPATIENT
Start: 2025-06-24 | End: 2025-07-01

## 2025-06-25 ENCOUNTER — ANESTHESIA (OUTPATIENT)
Dept: PERIOP | Facility: HOSPITAL | Age: 65
End: 2025-06-25
Payer: MEDICARE

## 2025-06-25 ENCOUNTER — APPOINTMENT (OUTPATIENT)
Dept: GENERAL RADIOLOGY | Facility: HOSPITAL | Age: 65
End: 2025-06-25
Payer: MEDICARE

## 2025-06-25 ENCOUNTER — ANESTHESIA EVENT (OUTPATIENT)
Dept: PERIOP | Facility: HOSPITAL | Age: 65
End: 2025-06-25
Payer: MEDICARE

## 2025-06-25 ENCOUNTER — HOSPITAL ENCOUNTER (OUTPATIENT)
Facility: HOSPITAL | Age: 65
Setting detail: HOSPITAL OUTPATIENT SURGERY
Discharge: HOME | End: 2025-06-25
Attending: NEUROLOGICAL SURGERY | Admitting: NEUROLOGICAL SURGERY
Payer: MEDICARE

## 2025-06-25 VITALS
SYSTOLIC BLOOD PRESSURE: 160 MMHG | OXYGEN SATURATION: 98 % | TEMPERATURE: 97 F | BODY MASS INDEX: 28.52 KG/M2 | WEIGHT: 145.28 LBS | HEART RATE: 74 BPM | RESPIRATION RATE: 17 BRPM | DIASTOLIC BLOOD PRESSURE: 92 MMHG | HEIGHT: 60 IN

## 2025-06-25 DIAGNOSIS — M51.26 HERNIATED NUCLEUS PULPOSUS, L3-4 RIGHT: ICD-10-CM

## 2025-06-25 LAB
QT INTERVAL: 450 MS
QTC INTERVAL: 463 MS

## 2025-06-25 PROCEDURE — 25010000002 LIDOCAINE PF 2% 2 % SOLUTION: Performed by: NURSE ANESTHETIST, CERTIFIED REGISTERED

## 2025-06-25 PROCEDURE — 25010000002 ALBUMIN HUMAN 5% PER 50 ML: Performed by: NURSE ANESTHETIST, CERTIFIED REGISTERED

## 2025-06-25 PROCEDURE — 25010000002 FENTANYL CITRATE (PF) 50 MCG/ML SOLUTION: Performed by: ANESTHESIOLOGY

## 2025-06-25 PROCEDURE — 25010000002 SUGAMMADEX 200 MG/2ML SOLUTION: Performed by: NURSE ANESTHETIST, CERTIFIED REGISTERED

## 2025-06-25 PROCEDURE — 25010000002 ONDANSETRON PER 1 MG: Performed by: NURSE ANESTHETIST, CERTIFIED REGISTERED

## 2025-06-25 PROCEDURE — P9041 ALBUMIN (HUMAN),5%, 50ML: HCPCS | Performed by: NURSE ANESTHETIST, CERTIFIED REGISTERED

## 2025-06-25 PROCEDURE — 25010000002 CEFAZOLIN PER 500 MG: Performed by: NEUROLOGICAL SURGERY

## 2025-06-25 PROCEDURE — 76000 FLUOROSCOPY <1 HR PHYS/QHP: CPT

## 2025-06-25 PROCEDURE — 25010000002 METHYLPREDNISOLONE PER 40 MG: Performed by: NEUROLOGICAL SURGERY

## 2025-06-25 PROCEDURE — 25010000002 MAGNESIUM SULFATE IN D5W 1G/100ML (PREMIX) 1-5 GM/100ML-% SOLUTION: Performed by: NURSE ANESTHETIST, CERTIFIED REGISTERED

## 2025-06-25 PROCEDURE — 25010000002 HYDROMORPHONE 1 MG/ML SOLUTION: Performed by: NURSE ANESTHETIST, CERTIFIED REGISTERED

## 2025-06-25 PROCEDURE — 63030 LAMOT DCMPRN NRV RT 1 LMBR: CPT | Performed by: NEUROLOGICAL SURGERY

## 2025-06-25 PROCEDURE — 25810000003 LACTATED RINGERS PER 1000 ML: Performed by: ANESTHESIOLOGY

## 2025-06-25 PROCEDURE — 25010000002 LIDOCAINE 1% - EPINEPHRINE 1:100000 1 %-1:100000 SOLUTION: Performed by: NEUROLOGICAL SURGERY

## 2025-06-25 PROCEDURE — 25010000002 PROPOFOL 10 MG/ML EMULSION: Performed by: NURSE ANESTHETIST, CERTIFIED REGISTERED

## 2025-06-25 PROCEDURE — 25010000002 PHENYLEPHRINE HCL-NACL 1000-0.9 MCG/10ML-% SOLUTION PREFILLED SYRINGE: Performed by: NURSE ANESTHETIST, CERTIFIED REGISTERED

## 2025-06-25 PROCEDURE — 25010000002 FENTANYL CITRATE (PF) 50 MCG/ML SOLUTION: Performed by: NURSE ANESTHETIST, CERTIFIED REGISTERED

## 2025-06-25 PROCEDURE — 93005 ELECTROCARDIOGRAM TRACING: CPT | Performed by: ANESTHESIOLOGY

## 2025-06-25 PROCEDURE — 25010000002 MIDAZOLAM PER 1MG: Performed by: ANESTHESIOLOGY

## 2025-06-25 PROCEDURE — 25010000002 DEXAMETHASONE PER 1 MG: Performed by: NURSE ANESTHETIST, CERTIFIED REGISTERED

## 2025-06-25 DEVICE — HEMOST ABS SURGIFOAM SZ12/7 2X6 7MM: Type: IMPLANTABLE DEVICE | Site: SPINE LUMBAR | Status: FUNCTIONAL

## 2025-06-25 RX ORDER — MIDAZOLAM HYDROCHLORIDE 2 MG/2ML
2 INJECTION, SOLUTION INTRAMUSCULAR; INTRAVENOUS ONCE
Status: COMPLETED | OUTPATIENT
Start: 2025-06-25 | End: 2025-06-25

## 2025-06-25 RX ORDER — FENTANYL CITRATE 50 UG/ML
25 INJECTION, SOLUTION INTRAMUSCULAR; INTRAVENOUS
Refills: 0 | Status: DISCONTINUED | OUTPATIENT
Start: 2025-06-25 | End: 2025-06-25 | Stop reason: HOSPADM

## 2025-06-25 RX ORDER — PROMETHAZINE HYDROCHLORIDE 25 MG/1
25 TABLET ORAL ONCE AS NEEDED
Status: DISCONTINUED | OUTPATIENT
Start: 2025-06-25 | End: 2025-06-25 | Stop reason: HOSPADM

## 2025-06-25 RX ORDER — PHENYLEPHRINE HCL IN 0.9% NACL 1 MG/10 ML
SYRINGE (ML) INTRAVENOUS AS NEEDED
Status: DISCONTINUED | OUTPATIENT
Start: 2025-06-25 | End: 2025-06-25 | Stop reason: SURG

## 2025-06-25 RX ORDER — OXYCODONE HYDROCHLORIDE 5 MG/1
5 TABLET ORAL
Status: DISCONTINUED | OUTPATIENT
Start: 2025-06-25 | End: 2025-06-25 | Stop reason: HOSPADM

## 2025-06-25 RX ORDER — ACETAMINOPHEN 500 MG
1000 TABLET ORAL ONCE
Status: COMPLETED | OUTPATIENT
Start: 2025-06-25 | End: 2025-06-25

## 2025-06-25 RX ORDER — ALBUMIN HUMAN 50 G/1000ML
SOLUTION INTRAVENOUS CONTINUOUS PRN
Status: DISCONTINUED | OUTPATIENT
Start: 2025-06-25 | End: 2025-06-25 | Stop reason: SURG

## 2025-06-25 RX ORDER — MAGNESIUM HYDROXIDE 1200 MG/15ML
LIQUID ORAL AS NEEDED
Status: DISCONTINUED | OUTPATIENT
Start: 2025-06-25 | End: 2025-06-25 | Stop reason: HOSPADM

## 2025-06-25 RX ORDER — PROMETHAZINE HYDROCHLORIDE 25 MG/1
25 SUPPOSITORY RECTAL ONCE AS NEEDED
Status: DISCONTINUED | OUTPATIENT
Start: 2025-06-25 | End: 2025-06-25 | Stop reason: HOSPADM

## 2025-06-25 RX ORDER — ONDANSETRON 2 MG/ML
4 INJECTION INTRAMUSCULAR; INTRAVENOUS ONCE AS NEEDED
Status: DISCONTINUED | OUTPATIENT
Start: 2025-06-25 | End: 2025-06-25 | Stop reason: HOSPADM

## 2025-06-25 RX ORDER — HYDROCODONE BITARTRATE AND ACETAMINOPHEN 5; 325 MG/1; MG/1
1 TABLET ORAL EVERY 6 HOURS PRN
Qty: 20 TABLET | Refills: 0 | Status: SHIPPED | OUTPATIENT
Start: 2025-06-25

## 2025-06-25 RX ORDER — LIDOCAINE HYDROCHLORIDE 20 MG/ML
INJECTION, SOLUTION EPIDURAL; INFILTRATION; INTRACAUDAL; PERINEURAL AS NEEDED
Status: DISCONTINUED | OUTPATIENT
Start: 2025-06-25 | End: 2025-06-25 | Stop reason: SURG

## 2025-06-25 RX ORDER — PROPOFOL 10 MG/ML
VIAL (ML) INTRAVENOUS AS NEEDED
Status: DISCONTINUED | OUTPATIENT
Start: 2025-06-25 | End: 2025-06-25 | Stop reason: SURG

## 2025-06-25 RX ORDER — ONDANSETRON 2 MG/ML
INJECTION INTRAMUSCULAR; INTRAVENOUS AS NEEDED
Status: DISCONTINUED | OUTPATIENT
Start: 2025-06-25 | End: 2025-06-25 | Stop reason: SURG

## 2025-06-25 RX ORDER — FENTANYL CITRATE 50 UG/ML
INJECTION, SOLUTION INTRAMUSCULAR; INTRAVENOUS AS NEEDED
Status: DISCONTINUED | OUTPATIENT
Start: 2025-06-25 | End: 2025-06-25 | Stop reason: SURG

## 2025-06-25 RX ORDER — DEXAMETHASONE SODIUM PHOSPHATE 4 MG/ML
INJECTION, SOLUTION INTRA-ARTICULAR; INTRALESIONAL; INTRAMUSCULAR; INTRAVENOUS; SOFT TISSUE AS NEEDED
Status: DISCONTINUED | OUTPATIENT
Start: 2025-06-25 | End: 2025-06-25 | Stop reason: SURG

## 2025-06-25 RX ORDER — SCOPOLAMINE 1 MG/3D
1 PATCH, EXTENDED RELEASE TRANSDERMAL ONCE
Status: DISCONTINUED | OUTPATIENT
Start: 2025-06-25 | End: 2025-06-25 | Stop reason: HOSPADM

## 2025-06-25 RX ORDER — LIDOCAINE HYDROCHLORIDE AND EPINEPHRINE 10; 10 MG/ML; UG/ML
INJECTION, SOLUTION INFILTRATION; PERINEURAL AS NEEDED
Status: DISCONTINUED | OUTPATIENT
Start: 2025-06-25 | End: 2025-06-25 | Stop reason: HOSPADM

## 2025-06-25 RX ORDER — MAGNESIUM SULFATE 1 G/100ML
INJECTION INTRAVENOUS AS NEEDED
Status: DISCONTINUED | OUTPATIENT
Start: 2025-06-25 | End: 2025-06-25 | Stop reason: SURG

## 2025-06-25 RX ORDER — METHYLPREDNISOLONE ACETATE 40 MG/ML
INJECTION, SUSPENSION INTRA-ARTICULAR; INTRALESIONAL; INTRAMUSCULAR; SOFT TISSUE AS NEEDED
Status: DISCONTINUED | OUTPATIENT
Start: 2025-06-25 | End: 2025-06-25 | Stop reason: HOSPADM

## 2025-06-25 RX ORDER — ROCURONIUM BROMIDE 10 MG/ML
INJECTION, SOLUTION INTRAVENOUS AS NEEDED
Status: DISCONTINUED | OUTPATIENT
Start: 2025-06-25 | End: 2025-06-25 | Stop reason: SURG

## 2025-06-25 RX ORDER — SODIUM CHLORIDE, SODIUM LACTATE, POTASSIUM CHLORIDE, CALCIUM CHLORIDE 600; 310; 30; 20 MG/100ML; MG/100ML; MG/100ML; MG/100ML
9 INJECTION, SOLUTION INTRAVENOUS CONTINUOUS PRN
Status: DISCONTINUED | OUTPATIENT
Start: 2025-06-25 | End: 2025-06-25 | Stop reason: HOSPADM

## 2025-06-25 RX ADMIN — MAGNESIUM SULFATE 1 G: 1 INJECTION INTRAVENOUS at 11:58

## 2025-06-25 RX ADMIN — Medication 200 MCG: at 12:43

## 2025-06-25 RX ADMIN — ALBUMIN (HUMAN): 12.5 INJECTION, SOLUTION INTRAVENOUS at 11:44

## 2025-06-25 RX ADMIN — ROCURONIUM BROMIDE 50 MG: 10 INJECTION, SOLUTION INTRAVENOUS at 11:39

## 2025-06-25 RX ADMIN — FENTANYL CITRATE 25 MCG: 50 INJECTION, SOLUTION INTRAMUSCULAR; INTRAVENOUS at 13:04

## 2025-06-25 RX ADMIN — OXYCODONE HYDROCHLORIDE 5 MG: 5 TABLET ORAL at 13:29

## 2025-06-25 RX ADMIN — Medication 200 MCG: at 12:28

## 2025-06-25 RX ADMIN — MIDAZOLAM HYDROCHLORIDE 2 MG: 1 INJECTION, SOLUTION INTRAMUSCULAR; INTRAVENOUS at 11:16

## 2025-06-25 RX ADMIN — FENTANYL CITRATE 50 MCG: 50 INJECTION, SOLUTION INTRAMUSCULAR; INTRAVENOUS at 12:00

## 2025-06-25 RX ADMIN — Medication 100 MCG: at 12:13

## 2025-06-25 RX ADMIN — Medication 200 MCG: at 12:08

## 2025-06-25 RX ADMIN — Medication 200 MCG: at 12:58

## 2025-06-25 RX ADMIN — ALBUMIN (HUMAN): 12.5 INJECTION, SOLUTION INTRAVENOUS at 12:14

## 2025-06-25 RX ADMIN — ONDANSETRON 4 MG: 2 INJECTION INTRAMUSCULAR; INTRAVENOUS at 11:38

## 2025-06-25 RX ADMIN — PROPOFOL 150 MG: 10 INJECTION, EMULSION INTRAVENOUS at 11:38

## 2025-06-25 RX ADMIN — Medication 200 MCG: at 12:17

## 2025-06-25 RX ADMIN — LIDOCAINE HYDROCHLORIDE 60 MG: 20 INJECTION, SOLUTION INTRAVENOUS at 11:38

## 2025-06-25 RX ADMIN — SCOPOLAMINE 1 PATCH: 1.5 PATCH, EXTENDED RELEASE TRANSDERMAL at 10:46

## 2025-06-25 RX ADMIN — Medication 200 MCG: at 11:49

## 2025-06-25 RX ADMIN — Medication 200 MCG: at 11:55

## 2025-06-25 RX ADMIN — PROPOFOL 200 MCG/KG/MIN: 10 INJECTION, EMULSION INTRAVENOUS at 11:41

## 2025-06-25 RX ADMIN — SODIUM CHLORIDE, POTASSIUM CHLORIDE, SODIUM LACTATE AND CALCIUM CHLORIDE 9 ML/HR: 600; 310; 30; 20 INJECTION, SOLUTION INTRAVENOUS at 10:45

## 2025-06-25 RX ADMIN — FENTANYL CITRATE 25 MCG: 50 INJECTION, SOLUTION INTRAMUSCULAR; INTRAVENOUS at 14:12

## 2025-06-25 RX ADMIN — SODIUM CHLORIDE 2 G: 9 INJECTION, SOLUTION INTRAVENOUS at 11:43

## 2025-06-25 RX ADMIN — DEXAMETHASONE SODIUM PHOSPHATE 8 MG: 4 INJECTION, SOLUTION INTRAMUSCULAR; INTRAVENOUS at 11:38

## 2025-06-25 RX ADMIN — Medication 200 MCG: at 12:02

## 2025-06-25 RX ADMIN — SUGAMMADEX 200 MG: 100 INJECTION, SOLUTION INTRAVENOUS at 12:45

## 2025-06-25 RX ADMIN — Medication 200 MCG: at 12:23

## 2025-06-25 RX ADMIN — FENTANYL CITRATE 25 MCG: 50 INJECTION, SOLUTION INTRAMUSCULAR; INTRAVENOUS at 13:09

## 2025-06-25 RX ADMIN — ACETAMINOPHEN 1000 MG: 500 TABLET ORAL at 10:45

## 2025-06-25 RX ADMIN — HYDROMORPHONE HYDROCHLORIDE 0.5 MG: 1 INJECTION, SOLUTION INTRAMUSCULAR; INTRAVENOUS; SUBCUTANEOUS at 13:50

## 2025-06-25 NOTE — OP NOTE
LUMBAR DISCECTOMY POSTERIOR WITH METRIX  Procedure Report    Patient Name:  Janna Rich  YOB: 1960    Date of Surgery:  6/25/2025     Indications:  Right L3-4 disc herniation with radiculopathy and weakness    Pre-op Diagnosis:   Herniated nucleus pulposus, L3-4 right [M51.26]       Post-Op Diagnosis Codes:     * Herniated nucleus pulposus, L3-4 right [M51.26]    Procedure/CPT® Codes:  MA LAMNOTMY INCL W/DCMPRSN NRV ROOT 1 INTRSPC LUMBR [99342]    Procedure(s):  MINIMALLY INVASIVE LUMBAR DISCECTOMY, right, lumbar 3-lumbar 4    Staff:  Surgeon(s):  Genaro Souza MD    Assistant: Ana Rosa Arevalo RN CSA    Anesthesia: General    Estimated Blood Loss: 30 mL      Specimen:          None        Findings: Superior disc extrusion    Complications: None    Description of Procedure: After informed consent was obtained, the patient was brought to the operating room.  After induction of adequate general endotracheal anesthesia the patient was placed in the prone position on the Harley table utilizing the Hernan frame.  All pressure points were padded.  The back was prepped and draped in the typical fashion.  The midline was marked and a line approximately 1-1/2 cm to the right of midline was drawn.  On this line the L3-4 interspace was localized using a spinal needle and the C arm.  A 2 cm incision was then made over the level.  The Boss tubular retractor system was used to dilate the tissue docking at L3-4.  The level was again confirmed with fluoroscopy.  The microscope was brought in and used for the remainder of the case for improved magnification and illumination.  The lamina was cleared of soft tissue and the Kerrison punches were used to remove the lamina above the insertion of the ligamentum flavum.  The ligamentum flavum was then resected inferiorly and laterally undercutting the medial facet.  The L4 nerve root was mobilized medially and retracted by my assistant.There was a superiorly  displaced disc protrusion which was a free fragment. This was retrieved with the nerve hook and small upbiting pituitary. After decompression was felt to be adequate, hemostasis was obtained using the bipolar electrocautery as well as Gelfoam with thrombin.  The wound was irrigated with normal saline.40 mg of depo-medrol was place over the spinal sac and L4 nerve.     The tubular retractor was removed and hemostasis assured in the soft tissue.  The incision was reapproximated using interrupted 0 Vicryl in the fascia and a 2-0 Vicryl in the subcutaneous tissue.  The wound was dressed with Mastisol, Steri-Strips, Telfa and a Tegaderm.     Assistant: Ana Rosa Arevalo RN CSA  was responsible for performing the following activities: Retraction, Suction, Irrigation, Closing, and Placing Dressing and their skilled assistance was necessary for the success of this case.    Genaro Souza MD     Date: 6/25/2025  Time: 12:58 EDT

## 2025-06-25 NOTE — ANESTHESIA PREPROCEDURE EVALUATION
Anesthesia Evaluation     Patient summary reviewed and Nursing notes reviewed                Airway   Mallampati: I  TM distance: >3 FB  Neck ROM: full  No difficulty expected  Dental      Pulmonary - normal exam    breath sounds clear to auscultation  (+) ,sleep apnea  Cardiovascular - normal exam    Rhythm: regular  Rate: normal    (+) hypertension, valvular problems/murmurs MVP, PVD, hyperlipidemia      Neuro/Psych  (+) headaches, tremors, numbness, psychiatric history Anxiety  GI/Hepatic/Renal/Endo    (+) hiatal hernia, GERD, GI bleeding , thyroid problem hypothyroidism    Musculoskeletal     (+) neck pain  Abdominal    Substance History - negative use     OB/GYN negative ob/gyn ROS         Other   arthritis,                       Anesthesia Plan    ASA 3     general     intravenous induction     Anesthetic plan, risks, benefits, and alternatives have been provided, discussed and informed consent has been obtained with: patient.        CODE STATUS:          no

## 2025-06-25 NOTE — DISCHARGE INSTRUCTIONS
DISCHARGE INSTRUCTIONS  DISCECTOMY/ LAMINECTOMY  [x] MINIMALLY INVASIVE      For your surgery you had:  General anesthesia (you may have a sore throat for the first 24 hours)  Nausea patch may be removed in 24-72 hours.    You may experience dizziness, drowsiness, or light-headedness for several hours following surgery  Do not stay alone today or tonight.  Limit your activity for 24 hours.  You should not drive, operate machinery, drink alcohol, or sign legally binding documents for 24 hours or while you are taking pain medication.    Activity  For the first two weeks following surgery, remain close to home and do not do any lifting, bending or strenuous activity.  Walking is the best exercise and you can increase the amount you walk as you feel like it.  Riding in a car for short distances (15-20 minutes) is okay but do not drive until you are off all narcotic medications.  If you have a sedentary job, you may resume work as soon as you feel like it (this is rarely less than one week).    Pain Control  Take your pain medicines as needed and as prescribed.  As you are feeling better, you can decrease the prescribed pain medication and take over-the-counter medications such as Tylenol or Ibuprofen.  The prescribed pain medicines tend to be constipating so it is important to eat a well-balanced diet and take a stool softener if recommended by the doctor.  Activity such as walking also helps keep your bowels regular.    Last dose of pain medication was given at:    Tylenol 1000 mg given at 10:45. Do not exceed 4000 mg in a 24 hour period.  Oxycodone given at 1:29.    Incision Care  Your sutures are under the skin and will dissolve in time.  You may shower tomorrow, no submerging of incision for 2 weeks.       [x] You have a clear dressing, which you should remove in 3-4 days.  Beneath this dressing are steri-strips that will peel off over time.  If these steri-strips have not peeled off in 10-14 days, you may  remove them.    Gently washing your incision with mild soap and rinsing with water during your shower is all you need to do to care for the incision.  Do not scrub the incision or sit in the bathtub.  Check your incision site each day to see how it looks.  Some redness and bruising is normal for the first few days.    NOTIFY YOUR DOCTOR IF YOU EXPERIENCE ANY OF THE FOLLOWING:   You have a fever over 100.8o Fahrenheit orally  Shaking chills  Your incision is red, hot to touch, or has excessive drainage  Your incision starts to separate  Increase in bleeding or bleeding that is excessive  Nausea, vomiting and/or pain not controlled by prescribed medications  Unable to urinate in 6 hours after surgery  You may contact 's clinic at [phone] with any questions or concerns.  If unable to reach your doctor, please go to the closest emergency room.    SPECIAL INSTRUCTIONS:  1) No driving for 5-7 days and not taking narcotics.   2) May shower tomorrow no submerging incision.   3) Do not lift / push / pull more than 8-10 lbs.   4) Minimize bending/twisting at the waist and jarring activity such as lawnmower.

## 2025-06-25 NOTE — ANESTHESIA POSTPROCEDURE EVALUATION
Patient: Janna Rich    Procedure Summary       Date: 06/25/25 Room / Location: [facility] OR 06 / [facility] MAIN OR    Anesthesia Start: 1133 Anesthesia Stop: 1312    Procedure: MINIMALLY INVASIVE LUMBAR DISCECTOMY, right, lumbar 3-lumbar 4 (Right: Spine Lumbar) Diagnosis:       Herniated nucleus pulposus, L3-4 right      (Herniated nucleus pulposus, L3-4 right [M51.26])    Surgeons: Genaro Souza MD Provider: Segundo Yanez MD    Anesthesia Type: general ASA Status: 3            Anesthesia Type: general    Vitals  Vitals Value Taken Time   /74 06/25/25 13:58   Temp     Pulse 69 06/25/25 13:58   Resp     SpO2 96 % 06/25/25 13:58   Vitals shown include unfiled device data.        Post Anesthesia Care and Evaluation    Patient location during evaluation: bedside  Patient participation: complete - patient participated  Level of consciousness: awake    Airway patency: patent  PONV Status: none  Cardiovascular status: acceptable  Respiratory status: acceptable  Hydration status: acceptable

## 2025-07-14 NOTE — PAT
Reviewed the following with patient.    Arrival time of 1130.    Must have  over 18 for transportation home post procedure. Come to [facility], entrance [facility].     Education provided on laxative administration; bowel prep to be taken in two doses. Reviewed diet instructions for day prior to procedure. Only plain, unflavored water after midnight until two hours prior to arrival time.     Do not take any morning medications on the day of the procedure. Instead bring all prescribed medication and inhalers to the hospital the morning of the procedure. May take any nebulizer treatments or inhalers the morning of the procedure.     Plan to be here 3-4 hours.   Do not bring any valuables to hospital with you. Call Endo department for any questions.     Pt verbalized understanding of instructions.

## 2025-07-15 ENCOUNTER — OFFICE VISIT (OUTPATIENT)
Dept: NEUROSURGERY | Facility: CLINIC | Age: 65
End: 2025-07-15
Payer: MEDICARE

## 2025-07-15 VITALS
HEIGHT: 60 IN | WEIGHT: 147.9 LBS | BODY MASS INDEX: 29.04 KG/M2 | SYSTOLIC BLOOD PRESSURE: 125 MMHG | DIASTOLIC BLOOD PRESSURE: 75 MMHG

## 2025-07-15 DIAGNOSIS — Z98.890 S/P LUMBAR DISCECTOMY: Primary | ICD-10-CM

## 2025-07-15 PROCEDURE — 99024 POSTOP FOLLOW-UP VISIT: CPT | Performed by: NEUROLOGICAL SURGERY

## 2025-07-15 PROCEDURE — 3074F SYST BP LT 130 MM HG: CPT | Performed by: NEUROLOGICAL SURGERY

## 2025-07-15 PROCEDURE — 3078F DIAST BP <80 MM HG: CPT | Performed by: NEUROLOGICAL SURGERY

## 2025-07-15 PROCEDURE — 1159F MED LIST DOCD IN RCRD: CPT | Performed by: NEUROLOGICAL SURGERY

## 2025-07-15 PROCEDURE — 1160F RVW MEDS BY RX/DR IN RCRD: CPT | Performed by: NEUROLOGICAL SURGERY

## 2025-07-15 NOTE — PROGRESS NOTES
Janna Rich is a 65 y.o. female that presents with Post-op       Post-op    History of Present Illness  The patient presents for evaluation of back pain.    She reports an improvement in her pain levels, even prior to her surgery. She has regained the ability to lift her leg, although with some weakness. She experiences difficulty when ascending stairs. She also mentions a sensation of tightness below the surgical incision site, which intensifies with prolonged standing or sitting. Her mobility is currently limited to walking on flat surfaces within her home. She has not yet resumed driving but has managed to cover a distance of 2 miles.    Supplemental Information  She is scheduled for a colonoscopy on 07/28/2025.     Review of Systems   Musculoskeletal:  Positive for back pain.      Vitals:    07/15/25 1258   BP: 125/75        Physical Exam  Constitutional:       Comments: BMI 28.8   Pulmonary:      Effort: Pulmonary effort is normal.   Skin:     Comments: WHSS   Neurological:      Mental Status: She is alert.      Motor: No weakness.   Psychiatric:         Mood and Affect: Mood normal.           Assessment and Plan {CC Problem List  Visit Diagnosis  ROS  Review (Popup)  Cloubrain Maintenance  Quality  BestPractice  Medications  SmartSets  SnapShot Encounters  Media :23}   Problem List Items Addressed This Visit    None  Visit Diagnoses         S/P lumbar discectomy    -  Primary            Assessment & Plan  1. Post-surgical back pain.  Approximately 3 weeks post-surgery, the patient reports improvement in pain and weakness. Pain below the incision site is likely due to muscle spasm or tightness. Nerve compression led to muscle atrophy, which should improve over time. Recommended treatments include applying heat or ice to the affected area and taking Tylenol for pain management. Anti-inflammatories are not recommended due to the upcoming colonoscopy on 07/28/2025 and previous issues with NSAIDs.  Gradually increasing walking distance on flat surfaces is encouraged, while avoiding strenuous activities such as lifting, bending, twisting, or using a riding  for the next 3 weeks. Driving short distances is permitted. The incision can be submerged in water, and showering can continue as usual.       Follow Up {Instructions Charge Capture  Follow-up Communications :23}   No follow-ups on file.      Patient or patient representative verbalized consent for the use of Ambient Listening during the visit with  Genaro Souza MD for chart documentation. 7/15/2025  13:07 EDT

## 2025-07-25 ENCOUNTER — ANESTHESIA EVENT (OUTPATIENT)
Dept: GASTROENTEROLOGY | Facility: HOSPITAL | Age: 65
End: 2025-07-25
Payer: MEDICARE

## 2025-07-25 NOTE — ANESTHESIA PREPROCEDURE EVALUATION
Anesthesia Evaluation     Patient summary reviewed and Nursing notes reviewed   history of anesthetic complications:  PONV               Airway   Mallampati: I  TM distance: >3 FB  Neck ROM: full  No difficulty expected  Dental - normal exam     Pulmonary     breath sounds clear to auscultation  (+) ,sleep apnea (no cpap , mild)  Cardiovascular - normal exam  Exercise tolerance: good (4-7 METS)    ECG reviewed  Patient on routine beta blocker  Rhythm: regular  Rate: normal    (+) hypertension well controlled, valvular problems/murmurs murmur, PVD, hyperlipidemia      Neuro/Psych  (+) headaches (Migraines), tremors, numbness, psychiatric history Anxiety and Depression  GI/Hepatic/Renal/Endo    (+) hiatal hernia, GERD well controlled, PUD, GI bleeding resolved, thyroid problem hypothyroidism and hyperthyroidism    Musculoskeletal     (+) neck pain (Cervicalgia)  Abdominal    Substance History      OB/GYN          Other   arthritis,     ROS/Med Hx Other: Hx ischemic colitis    EKG on 6-25-25:  Sinus rhythm, HR 64  Probable left atrial enlargement  Right bundle branch block  Inferior infarct, old      Phys Exam Other: Zofran 4 mg IV in preop for PONV             Anesthesia Plan    ASA 3     general   total IV anesthesia  (Risks explained including allergic reactions, BP, HR, O2 changes, aspiration, apnea, advanced airway placement. Pt verbalized understanding. Patient understands anesthesia not responsible for dental damage.)  intravenous induction     Anesthetic plan, risks, benefits, and alternatives have been provided, discussed and informed consent has been obtained with: patient.  Pre-procedure education provided  Plan discussed with CRNA.    CODE STATUS:

## 2025-07-28 ENCOUNTER — HOSPITAL ENCOUNTER (OUTPATIENT)
Facility: HOSPITAL | Age: 65
Setting detail: HOSPITAL OUTPATIENT SURGERY
Discharge: HOME | End: 2025-07-28
Attending: INTERNAL MEDICINE | Admitting: INTERNAL MEDICINE
Payer: MEDICARE

## 2025-07-28 ENCOUNTER — ANESTHESIA (OUTPATIENT)
Dept: GASTROENTEROLOGY | Facility: HOSPITAL | Age: 65
End: 2025-07-28
Payer: MEDICARE

## 2025-07-28 VITALS
SYSTOLIC BLOOD PRESSURE: 97 MMHG | WEIGHT: 144.4 LBS | DIASTOLIC BLOOD PRESSURE: 62 MMHG | RESPIRATION RATE: 16 BRPM | BODY MASS INDEX: 28.2 KG/M2 | TEMPERATURE: 97.5 F | OXYGEN SATURATION: 97 % | HEART RATE: 61 BPM

## 2025-07-28 DIAGNOSIS — K55.9 ISCHEMIC COLITIS: ICD-10-CM

## 2025-07-28 PROCEDURE — 25010000002 LIDOCAINE PF 2% 2 % SOLUTION: Performed by: NURSE ANESTHETIST, CERTIFIED REGISTERED

## 2025-07-28 PROCEDURE — 25010000002 PROPOFOL 10 MG/ML EMULSION: Performed by: NURSE ANESTHETIST, CERTIFIED REGISTERED

## 2025-07-28 PROCEDURE — 45385 COLONOSCOPY W/LESION REMOVAL: CPT | Performed by: INTERNAL MEDICINE

## 2025-07-28 PROCEDURE — 25810000003 LACTATED RINGERS PER 1000 ML

## 2025-07-28 PROCEDURE — 88305 TISSUE EXAM BY PATHOLOGIST: CPT | Performed by: INTERNAL MEDICINE

## 2025-07-28 RX ORDER — SODIUM CHLORIDE, SODIUM LACTATE, POTASSIUM CHLORIDE, CALCIUM CHLORIDE 600; 310; 30; 20 MG/100ML; MG/100ML; MG/100ML; MG/100ML
30 INJECTION, SOLUTION INTRAVENOUS CONTINUOUS
Status: DISCONTINUED | OUTPATIENT
Start: 2025-07-28 | End: 2025-07-28 | Stop reason: HOSPADM

## 2025-07-28 RX ORDER — LIDOCAINE HYDROCHLORIDE 20 MG/ML
INJECTION, SOLUTION EPIDURAL; INFILTRATION; INTRACAUDAL; PERINEURAL AS NEEDED
Status: DISCONTINUED | OUTPATIENT
Start: 2025-07-28 | End: 2025-07-28 | Stop reason: SURG

## 2025-07-28 RX ORDER — PROPOFOL 10 MG/ML
VIAL (ML) INTRAVENOUS AS NEEDED
Status: DISCONTINUED | OUTPATIENT
Start: 2025-07-28 | End: 2025-07-28 | Stop reason: SURG

## 2025-07-28 RX ORDER — ONDANSETRON 2 MG/ML
4 INJECTION INTRAMUSCULAR; INTRAVENOUS ONCE
Status: DISCONTINUED | OUTPATIENT
Start: 2025-07-28 | End: 2025-07-28 | Stop reason: HOSPADM

## 2025-07-28 RX ADMIN — SODIUM CHLORIDE, POTASSIUM CHLORIDE, SODIUM LACTATE AND CALCIUM CHLORIDE: 600; 310; 30; 20 INJECTION, SOLUTION INTRAVENOUS at 12:42

## 2025-07-28 RX ADMIN — PROPOFOL 200 MCG/KG/MIN: 10 INJECTION, EMULSION INTRAVENOUS at 12:43

## 2025-07-28 RX ADMIN — LIDOCAINE HYDROCHLORIDE 40 MG: 20 INJECTION, SOLUTION INTRAVENOUS at 12:43

## 2025-07-28 RX ADMIN — PROPOFOL 50 MG: 10 INJECTION, EMULSION INTRAVENOUS at 12:43

## 2025-07-28 NOTE — H&P
Pre Procedure History & Physical    Chief Complaint:   F/u of ischemic colitis    Subjective     HPI:   64 yo F here for f/u of ischemic colitis.    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    Allergic     Ankle sprain     Anxiety     Arthritis     Arthritis of neck     Brain concussion Nov 2024    Fell and hit head on concrete    Bronchitis, chronic     Bursitis of hip     Carpal tunnel syndrome of left wrist 05/14/2016    Cervical disc herniation 12/29/2016    Cervical radiculopathy     Cervicalgia 12/04/2017    Clotting disorder 6/1/25    Ischemic colitis    Cold sore     Decreased sensation of lower extremity 12/04/2017    Depression     Facial aging     Finger numbness 05/14/2016    Finger numbness     Fracture, finger     Fracture, foot 06/22/2023    Frozen shoulder     Gastric reflux     GERD (gastroesophageal reflux disease)     Hand numbness 02/19/2018    the patient notes a two year hestory of hand numbness that began in the left hand. she was diagnosed with CTS and underwent carpal turnnel release without benefitl approximately 18 months ago, she develooped numbness in faisal right hand. On exam, she has numbness 1st and 2nd digit of both hands along with numbness over the lateral aspect of the bilateral forearms. I would be concerned about a C    Hand weakness 12/21/2017    Heart murmur     Hiatal hernia     High blood pressure     Hip arthrosis     HTN (hypertension)     Hyperlipidemia     Hyperthyroidism     Hypothyroidism     Injury of back     Knee swelling     Low back strain     Lumbago 12/04/2017    Lumbosacral disc disease     Migraine     Muscle atrophy of upper extremity 12/21/2017    MVP (mitral valve prolapse)     Night sweats     Paresthesia 12/21/2017    Peptic ulceration June2035    Ischemic colitis    Periarthritis of shoulder     Rectal bleed     [place], [place]    Seasonal allergies     Sleep apnea     Subacromial impingement, left  2016    Subclavian artery stenosis     MILD- DR. CORTEZ FOLLOWING    Tendinitis of knee     Tennis elbow     Thoracic disc disorder     Tremor 2018    the patient is noted to have a mild tremor that could be medication related    Trigger finger of left thumb 2016    Urinary tract infection        Past Surgical History:  Past Surgical History:   Procedure Laterality Date    ABDOMINAL SURGERY      Abdominalplasty (tummy tuck)    APPENDECTOMY      BACK SURGERY      L3L4 ruptured disc    BREAST AUGMENTATION      BREAST SURGERY      CARPAL TUNNEL RELEASE       SECTION  ,     COLONOSCOPY  ,     ENDOSCOPY  2013    FINGER SURGERY Right 1916    trigger thumb release - Dr. Kapoor    HAND SURGERY      HYSTERECTOMY      LAPAROSCOPIC CHOLECYSTECTOMY      LAPAROSCOPIC TUBAL LIGATION  1987    LUMBAR DISCECTOMY Right 2025    Procedure: MINIMALLY INVASIVE LUMBAR DISCECTOMY, right, lumbar 3-lumbar 4;  Surgeon: Genaro Souza MD;  Location: [facility] MAIN OR;  Service: Neurosurgery;  Laterality: Right;    TRIGGER POINT INJECTION      TUBAL ABDOMINAL LIGATION         Family History:  Family History   Problem Relation Age of Onset    Lung cancer Father     Hypertension Father     Cancer Father     Alcohol abuse Father     Arthritis Father     Hyperlipidemia Father     Aneurysm Father     Hypertension Brother     Drug abuse Brother     Hyperlipidemia Brother     Alcohol abuse Brother     Drug abuse Brother     Hyperlipidemia Brother     Hypertension Brother     Hyperlipidemia Brother     Hypertension Brother     Thyroid disease Sister        Social History:   reports that she has never smoked. She has never used smokeless tobacco. She reports current alcohol use of about 2.0 standard drinks of alcohol per week. She reports that she does not use drugs.    Medications:   Medications Prior to Admission   Medication Sig Dispense Refill Last Dose/Taking    albuterol sulfate  (90  Base) MCG/ACT inhaler Inhale 2 puffs Every 4 (Four) Hours As Needed for Wheezing. 18 g 2 Past Month    dicyclomine (BENTYL) 20 MG tablet Take 1 tablet by mouth 2 (Two) Times a Day As Needed (cramping/diarrhea). 180 tablet 0 Past Month    levothyroxine (SYNTHROID, LEVOTHROID) 112 MCG tablet TAKE 1 TABLET DAILY (Patient taking differently: Take 1 tablet by mouth Every Night.) 90 tablet 1 7/27/2025    metoprolol tartrate (LOPRESSOR) 25 MG tablet TAKE 1 TABLET TWICE A  tablet 3 7/28/2025    rosuvastatin (CRESTOR) 10 MG tablet TAKE 1 TABLET DAILY (Patient taking differently: Take 1 tablet by mouth Every Night.) 90 tablet 3 7/27/2025    spironolactone (ALDACTONE) 25 MG tablet Take 1 tablet by mouth Daily.   7/27/2025    valsartan (DIOVAN) 80 MG tablet TAKE 1 TABLET TWICE A  tablet 3 7/28/2025    zolpidem (AMBIEN) 10 MG tablet Take 1 tablet by mouth At Night As Needed for Sleep. 90 tablet 0 7/27/2025       Allergies:  Codeine, Morphine, and Ciprofloxacin    ROS:    Pertinent items are noted in HPI     Objective     Weight 65.5 kg (144 lb 6.4 oz).    Physical Exam   Constitutional: Pt is oriented to person, place, and time and well-developed, well-nourished, and in no distress.   Mouth/Throat: Oropharynx is clear and moist.   Neck: Normal range of motion.   Cardiovascular: Normal rate, regular rhythm and normal heart sounds.    Pulmonary/Chest: Effort normal and breath sounds normal.   Abdominal: Soft. Nontender  Skin: Skin is warm and dry.   Psychiatric: Mood, memory, affect and judgment normal.     Assessment & Plan     Diagnosis:  F/u of ischemic colitis    Anticipated Surgical Procedure:  Colonoscopy    The risks, benefits, and alternatives of this procedure have been discussed with the patient or the responsible party- the patient understands and agrees to proceed.

## 2025-07-28 NOTE — ANESTHESIA POSTPROCEDURE EVALUATION
Patient: Janna Rich    Procedure Summary       Date: 07/28/25 Room / Location: [facility] ENDOSCOPY 1 / [facility] ENDOSCOPY    Anesthesia Start: 1242 Anesthesia Stop: 1308    Procedure: COLONOSCOPY, WITH POSSIBLE BIOPSY Diagnosis:       Ischemic colitis      (Ischemic colitis [K55.9])    Surgeons: Yolanda Valverde MD Provider: Tena Casarez CRNA    Anesthesia Type: general ASA Status: 3            Anesthesia Type: general    Vitals  Vitals Value Taken Time   BP 97/62 07/28/25 13:20   Temp 36.4 °C (97.5 °F) 07/28/25 13:20   Pulse 61 07/28/25 13:20   Resp 16 07/28/25 13:20   SpO2 97 % 07/28/25 13:20           Post Anesthesia Care and Evaluation    Patient location during evaluation: bedside  Patient participation: complete - patient participated  Level of consciousness: awake  Pain management: adequate    Airway patency: patent  Anesthetic complications: No anesthetic complications  PONV Status: controlled  Cardiovascular status: acceptable and stable  Respiratory status: acceptable

## 2025-07-30 ENCOUNTER — RESULTS FOLLOW-UP (OUTPATIENT)
Dept: GASTROENTEROLOGY | Facility: HOSPITAL | Age: 65
End: 2025-07-30
Payer: MEDICARE

## 2025-07-30 LAB
CYTO UR: NORMAL
LAB AP CASE REPORT: NORMAL
LAB AP CLINICAL INFORMATION: NORMAL
PATH REPORT.FINAL DX SPEC: NORMAL
PATH REPORT.GROSS SPEC: NORMAL
QT INTERVAL: 450 MS
QTC INTERVAL: 463 MS

## 2025-07-30 NOTE — LETTER
July 30, 2025    Janna Rich  [address] [place] [address]    7/30/2025         Janna Rich   [address] [place] [address]            Dear Janna Valverde MD performed a(n) Colonoscopy on July 28, 2025; Your biopsy results were benign.  If applicable, please refer to the pathology and/or procedure report for more detailed information via MyChart or by obtaining a copy of the reports from our office. We recommend that you have a repeat Colonoscopy in 5 years.    A colonoscopy is the best way to screen for colon polyps and colon cancer, however despite careful evaluation, small polyps can sometimes be missed. If you should develop any bleeding, abdominal pain, weight loss, change in bowel habits, or any other GI complications, please inform our office as soon as possible.      Additionally, Yolanda Valverde MD recommends you adopt the following healthy habits to lower your risk of future polyps and colon cancer:    Exercise regularly.  Do not smoke or consume alcohol.  Limit red meat intake.  Include ample fruits and vegetables in your diet.    If you have any questions regarding your procedure or results, please do not hesitate to contact our office.    Sincerely,        [facility]

## 2025-08-14 ENCOUNTER — PREP FOR SURGERY (OUTPATIENT)
Dept: OTHER | Facility: HOSPITAL | Age: 65
End: 2025-08-14
Payer: MEDICARE

## 2025-08-14 ENCOUNTER — OFFICE VISIT (OUTPATIENT)
Dept: ORTHOPEDIC SURGERY | Facility: CLINIC | Age: 65
End: 2025-08-14
Payer: MEDICARE

## 2025-08-14 VITALS — HEIGHT: 60 IN | WEIGHT: 144 LBS | BODY MASS INDEX: 28.27 KG/M2

## 2025-08-14 DIAGNOSIS — M17.12 PRIMARY OSTEOARTHRITIS OF LEFT KNEE: Primary | ICD-10-CM

## 2025-08-14 DIAGNOSIS — M25.562 LEFT KNEE PAIN, UNSPECIFIED CHRONICITY: Primary | ICD-10-CM

## 2025-08-14 DIAGNOSIS — M17.12 PRIMARY OSTEOARTHRITIS OF LEFT KNEE: ICD-10-CM

## 2025-08-14 RX ORDER — TRANEXAMIC ACID 10 MG/ML
1000 INJECTION, SOLUTION INTRAVENOUS ONCE
OUTPATIENT
Start: 2025-08-14 | End: 2025-08-14

## 2025-08-15 DIAGNOSIS — G47.00 INSOMNIA, UNSPECIFIED TYPE: ICD-10-CM

## 2025-08-15 RX ORDER — ZOLPIDEM TARTRATE 10 MG/1
10 TABLET ORAL NIGHTLY PRN
Qty: 90 TABLET | Refills: 0 | Status: SHIPPED | OUTPATIENT
Start: 2025-08-15

## 2025-08-19 ENCOUNTER — CLINICAL SUPPORT (OUTPATIENT)
Dept: FAMILY MEDICINE CLINIC | Facility: CLINIC | Age: 65
End: 2025-08-19
Payer: MEDICARE

## 2025-08-19 DIAGNOSIS — G47.00 INSOMNIA, UNSPECIFIED TYPE: ICD-10-CM

## 2025-08-19 DIAGNOSIS — Z79.899 ENCOUNTER FOR MEDICATION MANAGEMENT: Primary | ICD-10-CM

## 2025-08-19 LAB
AMPHET+METHAMPHET UR QL: NEGATIVE
AMPHETAMINE INTERNAL CONTROL: NORMAL
AMPHETAMINES UR QL: NEGATIVE
BARBITURATE INTERNAL CONTROL: NORMAL
BARBITURATES UR QL SCN: NEGATIVE
BENZODIAZ UR QL SCN: NEGATIVE
BENZODIAZEPINE INTERNAL CONTROL: NORMAL
BUPRENORPHINE INTERNAL CONTROL: NORMAL
BUPRENORPHINE SERPL-MCNC: NEGATIVE NG/ML
CANNABINOIDS SERPL QL: NEGATIVE
COCAINE INTERNAL CONTROL: NORMAL
COCAINE UR QL: NEGATIVE
EXPIRATION DATE: NORMAL
Lab: NORMAL
MDMA (ECSTASY) INTERNAL CONTROL: NORMAL
MDMA UR QL SCN: NEGATIVE
METHADONE INTERNAL CONTROL: NORMAL
METHADONE UR QL SCN: NEGATIVE
METHAMPHETAMINE INTERNAL CONTROL: NORMAL
MORPHINE INTERNAL CONTROL: NORMAL
MORPHINE/OPIATES SCREEN, URINE: NEGATIVE
OXYCODONE INTERNAL CONTROL: NORMAL
OXYCODONE UR QL SCN: NEGATIVE
PCP UR QL SCN: NEGATIVE
PHENCYCLIDINE INTERNAL CONTROL: NORMAL
THC INTERNAL CONTROL: NORMAL

## 2025-08-19 PROCEDURE — 80305 DRUG TEST PRSMV DIR OPT OBS: CPT | Performed by: NURSE PRACTITIONER

## 2025-08-19 RX ORDER — ZOLPIDEM TARTRATE 10 MG/1
10 TABLET ORAL NIGHTLY PRN
Qty: 90 TABLET | Refills: 0 | Status: SHIPPED | OUTPATIENT
Start: 2025-08-19

## 2025-08-26 DIAGNOSIS — R94.31 ABNORMAL EKG: ICD-10-CM

## 2025-08-26 DIAGNOSIS — I10 PRIMARY HYPERTENSION: Primary | ICD-10-CM

## (undated) DEVICE — Device

## (undated) DEVICE — SLV SCD KN/LEN ADJ EXPRSS BLENDED MD 1P/U

## (undated) DEVICE — STERILE POLYISOPRENE POWDER-FREE SURGICAL GLOVES WITH EMOLLIENT COATING: Brand: PROTEXIS

## (undated) DEVICE — DRP MICROSCP LECIA W/CLEARLENS 137X381CM

## (undated) DEVICE — THE STERILE LIGHT HANDLE COVER IS USED WITH STERIS SURGICAL LIGHTING AND VISUALIZATION SYSTEMS.

## (undated) DEVICE — SUT VIC 0/0 UR6 27IN DYED J603H

## (undated) DEVICE — LINER SURG CANSTR SXN S/RIGD 1500CC

## (undated) DEVICE — SYR LL TP 10ML STRL

## (undated) DEVICE — SOL IRRG H2O PL/BG 1000ML STRL

## (undated) DEVICE — CONN JET HYDRA H20 AUXILIARY DISP

## (undated) DEVICE — GLV SURG BIOGEL LTX PF 7 1/2

## (undated) DEVICE — SOLIDIFIER LIQLOC PLS 1500CC BT

## (undated) DEVICE — LAMINECTOMY CERVICAL DISC-LF: Brand: MEDLINE INDUSTRIES, INC.

## (undated) DEVICE — GAMMEX® NON-LATEX SIZE 7.5, STERILE NEOPRENE POWDER-FREE SURGICAL GLOVE: Brand: GAMMEX

## (undated) DEVICE — DEFENDO AIR WATER SUCTION AND BIOPSY VALVE KIT FOR  OLYMPUS: Brand: DEFENDO AIR/WATER/SUCTION AND BIOPSY VALVE

## (undated) DEVICE — APPL CHLORAPREP HI/LITE 26ML ORNG

## (undated) DEVICE — ANTIBACTERIAL UNDYED BRAIDED (POLYGLACTIN 910), SYNTHETIC ABSORBABLE SUTURE: Brand: COATED VICRYL